# Patient Record
Sex: FEMALE | Race: WHITE | NOT HISPANIC OR LATINO | Employment: PART TIME | URBAN - METROPOLITAN AREA
[De-identification: names, ages, dates, MRNs, and addresses within clinical notes are randomized per-mention and may not be internally consistent; named-entity substitution may affect disease eponyms.]

---

## 2023-09-26 PROBLEM — Z13.71 SCREENING FOR GENETIC DISEASE CARRIER STATUS: Status: ACTIVE | Noted: 2023-09-26

## 2023-09-26 PROBLEM — O02.81 CHEMICAL PREGNANCY: Status: ACTIVE | Noted: 2023-09-26

## 2023-12-12 ENCOUNTER — OB ABSTRACT (OUTPATIENT)
Dept: OBGYN CLINIC | Facility: CLINIC | Age: 23
End: 2023-12-12

## 2023-12-12 ENCOUNTER — ULTRASOUND (OUTPATIENT)
Dept: OBGYN CLINIC | Facility: CLINIC | Age: 23
End: 2023-12-12
Payer: COMMERCIAL

## 2023-12-12 VITALS
WEIGHT: 112.6 LBS | BODY MASS INDEX: 19.22 KG/M2 | HEART RATE: 90 BPM | SYSTOLIC BLOOD PRESSURE: 110 MMHG | DIASTOLIC BLOOD PRESSURE: 70 MMHG | OXYGEN SATURATION: 98 % | HEIGHT: 64 IN

## 2023-12-12 DIAGNOSIS — Z36.89 CONFIRM FETAL CARDIAC ACTIVITY USING ULTRASOUND: ICD-10-CM

## 2023-12-12 DIAGNOSIS — Z3A.01 7 WEEKS GESTATION OF PREGNANCY: ICD-10-CM

## 2023-12-12 DIAGNOSIS — N91.2 AMENORRHEA: Primary | ICD-10-CM

## 2023-12-12 LAB — SL AMB POCT URINE HCG: POSITIVE

## 2023-12-12 PROCEDURE — 76817 TRANSVAGINAL US OBSTETRIC: CPT | Performed by: OBSTETRICS & GYNECOLOGY

## 2023-12-12 PROCEDURE — 99214 OFFICE O/P EST MOD 30 MIN: CPT | Performed by: OBSTETRICS & GYNECOLOGY

## 2023-12-12 PROCEDURE — 81025 URINE PREGNANCY TEST: CPT | Performed by: OBSTETRICS & GYNECOLOGY

## 2023-12-12 NOTE — PROGRESS NOTES
Assessment/Plan     Diagnoses and all orders for this visit:    Amenorrhea  -     POCT urine HCG    7 weeks gestation of pregnancy  -     Ambulatory Referral to Maternal Fetal Medicine; Future  -     AMB US OB < 14 weeks single or first gestation level 1    Confirm fetal cardiac activity using ultrasound  -     AMB US OB < 14 weeks single or first gestation level 1        Patient with viable pregnancy consistent with LMP. MANINDER will be 2024. Patient was advised to take prenatal vitamins. Advised to call if with pregnancy problems or concerns. Warning signs were discussed with patient. Discussed with patient avoidance of smoking, drinking and illicit drugs. She was advised to call if with questions regarding medication use during pregnancy. Patient referred to maternal-fetal medicine for first trimester ultrasound and genetic testing if desired. OB intake to be done virtually and return in 3 to 4 weeks for physical examination and initial OB exam.    Lashawn Salcido is an 20 y.o. woman who presents for pregnancy confirmation. Chief Complaint   Patient presents with    Pregnancy Ultrasound     LMP 10/19    Possible Pregnancy       Patient is here for a pregnancy confirmation. Patient's last menstrual period was 10/19/2023 (exact date).    Estimated Date of Delivery: 24      She denies vaginal bleeding or pelvic pain. She has no nausea or vomiting. She is taking prenatal vitamins. Her first positive pregnancy test was on 23. She had a chemical pregnancy a few months ago. She has not been on contraception recently. OB History    Para Term  AB Living   2 0 0 0 1 0   SAB IAB Ectopic Multiple Live Births   1 0 0 0 0      # Outcome Date GA Lbr Srikanth/2nd Weight Sex Delivery Anes PTL Lv   2 Current            1 SAB 23     Biochemical          No past medical history on file. No past surgical history on file.     Social History     Tobacco Use Smoking status: Never    Smokeless tobacco: Never   Vaping Use    Vaping Use: Never used   Substance Use Topics    Alcohol use: Not Currently    Drug use: Never       No Known Allergies      Current Outpatient Medications:     Prenatal Vit-Fe Fumarate-FA (PRENATAL 1+1 PO), Take by mouth, Disp: , Rfl:     The following portions of the patient's history were reviewed and updated as appropriate: allergies, current medications, past family history, past medical history, past social history, past surgical history, and problem list.      Review of Systems   Constitutional: Negative. HENT: Negative. Eyes: Negative. Respiratory: Negative. Cardiovascular: Negative. Gastrointestinal: Negative. Endocrine: Negative. Genitourinary:         As noted in HPI   Musculoskeletal: Negative. Skin: Negative. Allergic/Immunologic: Negative. Neurological: Negative. Hematological: Negative. Psychiatric/Behavioral: Negative. /70   Pulse 90   Ht 5' 4" (1.626 m)   Wt 51.1 kg (112 lb 9.6 oz)   LMP 10/19/2023 (Exact Date)   SpO2 98%   BMI 19.33 kg/m²     Physical Exam  Constitutional:       General: She is not in acute distress. Appearance: Normal appearance. She is well-developed. Abdominal:      Palpations: Abdomen is soft. Tenderness: There is no abdominal tenderness. There is no guarding. Neurological:      Mental Status: She is alert and oriented to person, place, and time. Skin:     General: Skin is warm and dry. Psychiatric:         Behavior: Behavior normal.           FIRST TRIMESTER OBSTETRIC ULTRASOUND  12/12/23    Li Will MD       INDICATION: Amenorrhea, viability    COMPARISON: None. TECHNIQUE:   Transvaginal imaging was performed to assess the gestation, myometrial/endometrial architecture and ovarian parenchymal detail. The study includes volumetric sweeps and traditional still imaging technique.       FINDINGS:     A single intrauterine gestation is identified. Cardiac activity is detected at 151 bpm.      YOLK SAC:  Present and normal in size and appearance. MEAN CROWN RUMP LENGTH:  12.5 mm = 7 weeks 3 days   AMNIOTIC FLUID/SAC SHAPE:  Within expected normal range. UTERUS/ADNEXA:   No adnexal mass or pathologic cyst.  No free fluid identified. IMPRESSION:     Single intrauterine pregnancy of 7 weeks 3 days gestational age. MANINDER by [address] 24  Fetal cardiac activity detected. No adnexal masses seen. Final MANINDER: 24 by LMP. Ultrasound Probe Disinfection    A transvaginal ultrasound was performed. Prior to use, disinfection was performed with High Level Disinfection Process ([employer]). Probe serial number F: [id number] was used. Caty Tellez MD  23  1:28 PM    I have spent a total time of 30 minutes on 23 in caring for this patient including Diagnostic results, Prognosis, Risks and benefits of tx options, Instructions for management, Patient and family education, Importance of tx compliance, Risk factor reductions, Impressions, Counseling / Coordination of care, Documenting in the medical record, Reviewing / ordering tests, medicine, procedures  , Obtaining or reviewing history  , and Communicating with other healthcare professionals .      Future Appointments   Date Time Provider [address]   2023  9:20 AM Sung Flynn PA-C OBGYN [address] [facility]   1/10/2024  2:30 PM Caty Tellez MD [facility]   2024 10:15 AM  [address]

## 2023-12-15 PROBLEM — O02.81 CHEMICAL PREGNANCY: Status: RESOLVED | Noted: 2023-09-26 | Resolved: 2023-12-15

## 2023-12-15 PROBLEM — Z13.71 SCREENING FOR GENETIC DISEASE CARRIER STATUS: Status: RESOLVED | Noted: 2023-09-26 | Resolved: 2023-12-15

## 2023-12-15 NOTE — PROGRESS NOTES
The patient was identified by name and date of birth. The patient was informed that this is a telemedicine visit and that the visit is being conducted through abeo and patient was informed this is a secure, HIPAA-complaint platform. She agrees to proceed..  My office door was closed. No one else was in the room.  She acknowledged consent and understanding of privacy and security of the video platform. The patient has agreed to participate and understands they can discontinue the visit at any time.    Patient location:  [place]    23 y.o. y/o female here for OB intake.     TV u/s done 23 confirms SLIUP  consistent with 10/19/23 LMP, final EDC 24 by LMP.        Current Outpatient Medications on File Prior to Visit   Medication Sig Dispense Refill    Prenatal Vit-Fe Fumarate-FA (PRENATAL 1+1 PO) Take by mouth       No current facility-administered medications on file prior to visit.       No past surgical history on file.    No past medical history on file.      Genetic screen:  Canavan disease- negative  Cerebral palsy- cousin X 2  Cleft lip/palate- negative  Congenital anomalies- negative  Congenital heart disease- negative  Consanguinity- negative  Cystic fibrosis- negative  Down's syndrome- negative  Hemophilia- negative  Covington's chorea- negative  Mental retardation/ intellectual disability/ cognitive delays- negative  Muscular dystrophy- negative  Neural tube defect- negative  Sickle cell anemia- negative  Carlos-sachs disease- negative  Fragile X- negative  Thalassemia- negative  Autism- negative  Type 1 diabetes- negative  PKU- negative  Premature ovarian failure- negative      OB History          2    Para   0    Term   0            AB   1    Living   0         SAB   1    IAB   0    Ectopic   0    Multiple   0    Live Births   0                 Previous pregnancy history/ complications: n/a    Age of patient: 23  Age of father of the baby: 27, Darvin Mathias    Exposure  risk:  Occupation:   Exposure chemicals/radiation:no  Medications taking since LMP:no  Smoker: no  Cat litter exposure: no    Depression screen:  negative  Domestic violence screen: negative      TB screening:   HIV-no  Close contact with individuals with known or suspected TB-no  Medical conditions known to increase risk of disease if infected  Ie. Diabetes, lupus, cancer, alcoholism, drug addiction- no  Birth in or emigration from high prevalent countries ([place], [place], [place], [place], [place], [place], [place], [place], [place]- no  Medically underserved- no  Homeless- no  Living or working in long term care facilities - yes    DRUG screening:  Parents:  Did any of your parents have a problem with alcohol or other drug use?  no    Partner: Does your partner have a problem with alcohol or drug use? no    Past: In the past, have you had difficulties in your life because of alcohol or other drugs, including prescription medication?  no    Present: In the past month have you drunk any alcohol or used other drugs?  no    Peers: Do any of your peers (friends, roommates, co-workers, etc) have a problem with alcohol or drug use? no    Cravings:  During this pregnancy, have you experienced cravings for substances such as opioids or methamphetamines? no    Substitute: During this pregnancy, have you taken or purchased buprenorphine (subutex or suboxone) that was prescribed to someone else? no    Thyroid disease risk:  BMI >30: no  Type 1 diabetes: no  Fhx or personal hx of thyroid disease: no    Diabetes risk screening:   BMI 30 or greater: no  Hx of macrosomia ( infant weight 9 lb or greater): no  Previous GDM hx: n/a  Hx PCOS or insulin resistance: no  Hx of elevated HgbA1c, glucose tolerance, fasting glucose: no    HTN: no  Hx elevated HDL/ triglycerides:no  1st degree relative with diabetes: no  Hx cardiovascular ds: no  , , ,  american, pacific  islander: no    BMI 25 or greater: no  BMI 23 or greater and  american: no    Glucola ordered: no    Other screening:  Ashkenazi Yarsani/ Syriac [place]/ Cajun descent: no, serafin-sac screening offered: no    Hx of gastric bypass/ gastric sleeve/ gastric band: no    Hx of HSV for patient or partner: no    Hx of MRSA: no    Last pap: 2022  Hx of abnormal pap smear: no  Hx of procedures to the cervix: no    Hx of chlamydia/ gonorrhea/ PID: no    Hx recurrent pregnancy loss/ stillbirth: no    Was this pregnancy a result of infertility treatment: no    Vaccine Hx:  Varicella: + vaccine  Flu vaccine: not completed  Hep B vaccine: completed   COVID vaccine :  completed x 2    Hx of covid in last 3 months: no    ASA/ PEC screen:  Previous uncomplicated full-term delivery: no  Multifetal gestation- 2 points: 0  Chronic HTN- 2 points: 0  Type 1 or 2 pre-gestational diabetes- 2 points: 0  Renal disease- 2 points: 0  Autoimmune disease- 2 points: 0  History of preeclampsia- 2 points: 0  IVF pregnancy- 1 point: 0  >10 year pregnancy interval-1 point: 0  Previous pregnancy with IUGR/ SGA/ adverse pregnancy outcome-1 point: 0  Nulliparity- 1 point: 1  BMI >30- 1 point: 0  Family history of preeclampsia in mother or sister- 1 point: 0  Age >or = 35- 1 point: 0   Race- 1 point: 0  Low socioeconomic status-1 point: 0    TOTAL SCORE: 1      Pertinent + Pre eclampsia risk factors: nulliparity  Pt has been recommended to take ASA 162mg during this pregnancy to lower her risk of preeclampsia: no    Other:  Pre pregnancy weight:   112lb    Ok with blood transfusion if needed: yes    Plans for feeding baby: breast    Blood type: ?    Hemoglobin electrophoresis completed in past: no    Preferred lab: [facility]    ONAF form needed: no    Nausea: occasional  Vomiting: no  severe cramping/ pain: no  Bleeding since pregnant: no  Urinary complaints: no  Fever or rash since pregnant: no    PROBLEM LIST includes:  Biochemical  pregnancy 9/2023  Family history of cerebral palsy:  patient says she is aware of no birth trauma/ early delivery.  Requests visit with genetic counselor      -Pregnancy: H&P completed today.       Prenatal course discussed with patient, including appointment schedule. Warning signs discussed and reviewed.  OB folder to be given with warning signs of pregnancy, prenatal visit schedule, safe medications in pregnancy, childbirth classes, lab location info, M info.    -Genetic testing: nuchal translucency/Sequential screening/ NIPT reviewed with patient - appt with M scheduled 1/11/24    -Carrier screening including Cystic fibrosis and spinal muscular atrophy reviewed: patient had negative testing in past    -OB exam: to be completed 1/10/24, see other encounter    -Reviewed benefits of influenza vaccination in pregnancy including but not limited to reduction in maternal influenza hospitalization, reduction in risk of stillbirth, and reduction in influenza-related morbidity and mortality among infants. Reviewed safety of influenza vaccine in pregnancy and overall very low risk of reaction or adverse effects. Patient voiced understanding of all this.        NEEDS:  ob folder, pap,gonorrhea/ chlamydia culture

## 2023-12-18 ENCOUNTER — TELEPHONE (OUTPATIENT)
Age: 23
End: 2023-12-18

## 2023-12-18 ENCOUNTER — TELEMEDICINE (OUTPATIENT)
Age: 23
End: 2023-12-18

## 2023-12-18 DIAGNOSIS — Z3A.08 8 WEEKS GESTATION OF PREGNANCY: ICD-10-CM

## 2023-12-18 DIAGNOSIS — Z34.80 ENCOUNTER FOR SUPERVISION OF OTHER NORMAL PREGNANCY, UNSPECIFIED TRIMESTER: Primary | ICD-10-CM

## 2023-12-18 DIAGNOSIS — Z82.0 FAMILY HISTORY OF CEREBRAL PALSY: ICD-10-CM

## 2023-12-18 PROCEDURE — OBC: Performed by: PHYSICIAN ASSISTANT

## 2023-12-18 NOTE — PATIENT INSTRUCTIONS
"Again, congratulations on your pregnancy!  NEXT STEPS  Get Prenatal bloodwork if not already done  Call MFM to schedule next ultrasound (done 12-13 wks)   Contact information for [facility] (AKA Maternal Fetal Medicine)- Main Number [phone]   Return to our office in 4 weeks for routine prenatal visit (or sooner if any problems/concerns arise- see packet for things to report)  Check out [facility] website to read the \"Pregnancy Essentials Guide\"      It can be found by going to [facility]-->select services-->select women's health-->select Obstetrics  [email or web address]  ----------------------------------------------------  Hospital Affiliation & [place]irections    [place] [address]    Warning Signs During Pregnancy  The list below includes warning signs your providers would like you to be aware of.  If you experience any of these at any time during your pregnancy, please call us as soon as possible.     Vaginal bleeding   Sharp abdominal pain that does not go away   Fever (more than 100.4?F and is not relieved with Tylenol)   Persistent vomiting lasting greater than 24 hours   Chest pain/Shortness of breath   Pain or burning when you urinate    Call the OFFICE [phone] for any questions/emergencies.  At night or on the weekend, calls go through a triage service, please indicate it is an emergency and the DOCTOR on call will be paged.  ---------------------------------------------------------------    Discomforts of Early Pregnancy  Tips for coping with nausea and vomiting during pregnancy   Eat meals and snacks slowly   Eat every 1-2 hours to avoid a full stomach   Don’t skip meals, avoid empty stomach   Eat a snack prior to getting out of bed   Avoid food and beverages with a strong aroma   Avoid dehydration - drink enough fluid to keep the urine pale yellow   Drink fluids before a meal to minimize " the effect of a full stomach   Limit the amount of coffee and beverages that contain caffeine   Eliminate spicy, odorous, high fat (fried foods), acidic (tomato products) and sweet foods   Fluids that contain lemon (lemonade), mint (tea) or orange can usually be well tolerated   Snacks and meals that contain low-fat protein (lean meats, fish, poultry and eggs) along with eating easily digestible carbs (fruit, rice, toast, crackers and dry cereal) may be tolerated better   Foods with ginger may be well tolerated. May use ginger root powder, capsules or extract (up to 1000 mg per day)   Drink liquids in small amounts    If symptoms persist, please contact your provider.      Tips for coping with constipation during pregnancy   Increase fiber and fluids.  - Drink 8-10 cups of liquid, like water or low-sugar juice daily  - Keep urine pale with fluids (water, milk), fruit and vegetables   Eat a well-balanced diet that contains high fiber food (fruits, vegetables, whole grain breads and cereals, bran and dried beans)   Take a 30-minute walk daily   You may take a mild stool softener such as Colace®    If symptoms persist, please contact your provider.      For any emergencies, PLEASE CALL THE OFFICE at [phone]. If the office is closed, the doctor on call will be paged by the answering service.    Medications and Pregnancy- see handout in packetExpected Weight Gain During Pregnancy  If you have a healthy BMI (18-25) prior to pregnancy:  The recommended weight gain is between 25-35 pounds. Approximate weight gain  in the first trimester is 1-4.5 pounds. An expected weight gain during the second and  third trimester is approximately one pound per week.  If you have a BMI of less than 18 prior to pregnancy,  you are considered underweight:  The recommended weight gain is between 28-40 pounds. Approximate weight gain  in the first trimester is 1-4.5 pounds. An expected weight gain during the second and  third trimester  is just over one pound per week.  If your BMI is 25 to 29.9: you are considered overweight:  You should gain 1/2 to 2/3 pound during the second and third trimester, for a total  weight gain of 15 to 25 pounds.  If you have a BMI of greater than 30 prior to pregnancy,  you are considered overweight:  The recommended weight gain is between 15-25 pounds. Approximate weight gain  in the first trimester is 1-4.5 pounds. An expected weight gain during the second  and third trimester is approximately 0.5 pound per week.    Foods to avoid during pregnancy:   Unpasteurized milk, juice and cheese  - Soft cheeses like feta or brie (if made with UNPASTEURIZED milk)   Unheated deli meats like lunchmeat and hotdogs   Undercooked poultry, beef, pork, seafood including raw sushi    What fish is safe to eat during pregnancy?  Eat 8 to 12 ounces of fish a week. Pick from this group frequently, especially if you follow  the American Heart Association’s recommendation to eat fish at least 2 times a week.    AVOID HIGH MERCURY FISH  A single meal of the following fish can put  you over the Environmental Protection  Agency’s safe limit for the month.  High mercury fish:  Shark  Swordfish  Osman Mackerel  Tile Fish    Caffeine and Pregnancy  The March of Dimes and American College of Obstetrics and Gynecologists (ACOG) urge pregnant  women to limit their caffeine consumption to no more than 200 milligrams (mg) per day. This is  comparable to having one 12-ounce cup of coffee a day. This level has been shown not to increase risk  of miscarriage, growth or  labor complications. Effects of higher levels are not known.    Exercise During Pregnancy  A daily exercise program that consists of 30 minutes a day is recommended.   Low impact exercises like walking and swimming are great exercises throughout  all of pregnancy   If you’re an avid strength  avoid lifting very heavy weights - nothing more  than 30 pounds    Drink plenty of  fluids while exercising to stay hydrated.  Be careful to avoid overheating.    ACTIVITIES TO AVOID   Exercises that can make you lose your balance.   Activities that can put your baby at risk i.e. horseback riding, scuba diving, skiing  or snowboarding. Any other sport that puts you at risk for getting hit in the  abdominal area.   Do not use saunas, steam rooms or hot tubs (that have a higher temperature  than 100F)   After the first trimester, avoid exercises that require you to lay flat on your back.   Avoid exceeding a heart rate greater than 140 beats per minute. As long as you are  able to hold a conversation while exercising your heart rate is likely acceptable    Vaccines and Pregnancy    Information for pregnant women  Vaccines help protect you and your baby against serious diseases.  Whooping Cough Vaccine  Whooping cough (or pertussis) can be serious for anyone, but for your , it can be lifethreatening.  Up to 20 babies die each year in the [place] due to whooping cough.   When you get the whooping cough vaccine during your pregnancy, your body will create protective  antibodies and pass some of them to your baby before birth. These antibodies will provide your  baby some short-term, early protection against whooping cough.  Learn more at www.cdc.gov/pertussis/pregnant/.    Flu Vaccine  Changes in your immune, heart, and lung functions during pregnancy make you more likely to get  seriously ill from the flu. Catching the flu also increases your chances for serious problems for your  developing baby, including premature labor and delivery. Get the flu shot if you are pregnant during  flu season--it’s the best way to protect yourself and your baby for several months after birth from flu-related  complications.  Flu seasons vary in their timing from season to season, but CDC recommends getting vaccinated  by the end of October, if possible. This timing helps protect you before flu activity  begins to increase.  Find more on how to prevent the flu by visiting www.cdc.gov/flu/.    Covid Vaccine  Similar to the flu, Changes in your immune, heart, and lung functions during pregnancy make you more likely to get  seriously ill from COVID. It  also increases your chances for serious problems for your  developing baby, including premature labor and delivery.  Please consider getting your covid vaccine if you haven't already. This is endorsed by both ACOG- American College of Obstetrics and Gynecology and SMFM- Society of Maternal Fetal Medicine    Fetal Activity  You will most likely start to feel your baby move (also known as quickening) between  18 and 20 weeks of pregnancy. First time moms might feel their baby’s movements  closer to 25 weeks while a second time mom might feel those first movements closer  to 16 weeks.

## 2023-12-18 NOTE — TELEPHONE ENCOUNTER
Phone call to patient as she has not signed onto virtual visit.    Patient answered and says she forgot.  She will log on now

## 2024-01-08 ENCOUNTER — TELEPHONE (OUTPATIENT)
Dept: PERINATAL CARE | Facility: OTHER | Age: 24
End: 2024-01-08

## 2024-01-08 NOTE — TELEPHONE ENCOUNTER
Left patient a message that her 1/11 [facility] appointment had to be rescheduled to 1/16 [facility] @ 8AM. The new time, date and location were provided.  The patient has been instructed to please call us back at [phone] with any questions or concerns.

## 2024-01-09 NOTE — PROGRESS NOTES
OB/GYN  PRENATAL H&P VISIT  Liza Mathias  1/10/2024  2:38 PM  Dr. Yanely Guzman MD      SUBJECTIVE  Patient is here for initial PE.  OB history was reviewed.      Estimated Date of Delivery: 24  11w5d      Bleeding no  Cramping/Pelvic Pain no  Abnormal Discharge no  Nausea no  Vomiting no    OB History    Para Term  AB Living   2 0 0 0 1 0   SAB IAB Ectopic Multiple Live Births   1 0 0 0 0      # Outcome Date GA Lbr Srikanth/2nd Weight Sex Delivery Anes PTL Lv   2 Current            1 SAB 23     Biochemical          Review of Systems   Constitutional: Negative.    HENT: Negative.     Eyes: Negative.    Respiratory: Negative.     Cardiovascular: Negative.    Gastrointestinal: Negative.    Endocrine: Negative.    Genitourinary:         As noted in HPI   Musculoskeletal: Negative.    Skin: Negative.    Allergic/Immunologic: Negative.    Neurological: Negative.    Hematological: Negative.    Psychiatric/Behavioral: Negative.         Past Medical History:   Diagnosis Date    Asthma     childhood       No past surgical history on file.    Social History     Socioeconomic History    Marital status: /Civil Union     Spouse name: Not on file    Number of children: Not on file    Years of education: Not on file    Highest education level: Not on file   Occupational History    Not on file   Tobacco Use    Smoking status: Never    Smokeless tobacco: Never   Vaping Use    Vaping status: Never Used   Substance and Sexual Activity    Alcohol use: Not Currently    Drug use: Never    Sexual activity: Yes     Partners: Male     Birth control/protection: None   Other Topics Concern    Not on file   Social History Narrative    Not on file     Social Determinants of Health     Financial Resource Strain: Not on file   Food Insecurity: Not on file   Transportation Needs: Not on file   Physical Activity: Not on file   Stress: Not on file   Social Connections: Not on file   Intimate Partner Violence: Not  "on file   Housing Stability: Not on file       OBJECTIVE    /62   Pulse 73   Ht 5' 4\" (1.626 m)   Wt 52.2 kg (115 lb)   LMP 10/19/2023 (Exact Date)   SpO2 99%   BMI 19.74 kg/m²       Physical Exam  Constitutional:       Appearance: She is well-developed.   Genitourinary:      No vaginal discharge.        Right Adnexa: not tender and no mass present.     Left Adnexa: no mass present.     No cervical motion tenderness, lesion or polyp.      Uterus is enlarged.      Uterus is not tender.   Breasts:     Right: No mass, skin change or tenderness.      Left: No mass, skin change or tenderness.   HENT:      Head: Normocephalic.   Cardiovascular:      Rate and Rhythm: Normal rate and regular rhythm.      Heart sounds: Normal heart sounds.   Pulmonary:      Effort: Pulmonary effort is normal.      Breath sounds: Normal breath sounds.   Abdominal:      General: There is no distension.      Palpations: Abdomen is soft.      Tenderness: There is no abdominal tenderness. There is no guarding.   Neurological:      Mental Status: She is alert and oriented to person, place, and time.   Skin:     General: Skin is warm and dry.   Psychiatric:         Behavior: Behavior normal.           ASSESSMENT AND PLAN    Problem List          Nervous and Auditory    Family history of cerebral palsy    Overview     Patient requests referral to genetic counselor            Other    Encounter for supervision of other normal pregnancy, unspecified trimester    Overview     CF/ SMA testing: negative carrier  Flu vaccine: received 12/18/23  Covid vaccine:  completed X 2         11 weeks gestation of pregnancy       by doppler, confirmed with US        1. Pregnancy: H&P completed today. PN Labs to still be done      Prenatal course discussed with patient, including appointment schedule. Warning signs discussed and reviewed.    2. Screening: Pap smear 2022. GC/CT collected.     3. Genetic testing: Pt has appt with [facility]    4. " Immunizations: flu vaccine at [facility] received  23    5. Follow up: Return in 4 weeks.       Future Appointments   Date Time Provider Department Center   2024  8:00 AM [facility] 3 [facility]         Yanely Guzman MD  2:38 PM

## 2024-01-10 ENCOUNTER — INITIAL PRENATAL (OUTPATIENT)
Dept: OBGYN CLINIC | Facility: CLINIC | Age: 24
End: 2024-01-10

## 2024-01-10 VITALS
HEIGHT: 64 IN | WEIGHT: 115 LBS | DIASTOLIC BLOOD PRESSURE: 62 MMHG | HEART RATE: 73 BPM | OXYGEN SATURATION: 99 % | SYSTOLIC BLOOD PRESSURE: 100 MMHG | BODY MASS INDEX: 19.63 KG/M2

## 2024-01-10 DIAGNOSIS — Z11.3 SCREEN FOR STD (SEXUALLY TRANSMITTED DISEASE): ICD-10-CM

## 2024-01-10 DIAGNOSIS — Z3A.11 11 WEEKS GESTATION OF PREGNANCY: ICD-10-CM

## 2024-01-10 DIAGNOSIS — Z82.0 FAMILY HISTORY OF CEREBRAL PALSY: Primary | ICD-10-CM

## 2024-01-10 PROCEDURE — PNV: Performed by: OBSTETRICS & GYNECOLOGY

## 2024-01-10 PROCEDURE — 87591 N.GONORRHOEAE DNA AMP PROB: CPT | Performed by: OBSTETRICS & GYNECOLOGY

## 2024-01-10 PROCEDURE — 87491 CHLMYD TRACH DNA AMP PROBE: CPT | Performed by: OBSTETRICS & GYNECOLOGY

## 2024-01-11 LAB
C TRACH DNA SPEC QL NAA+PROBE: NEGATIVE
C TRACH DNA SPEC QL NAA+PROBE: NEGATIVE
N GONORRHOEA DNA SPEC QL NAA+PROBE: NEGATIVE
N GONORRHOEA DNA SPEC QL NAA+PROBE: NEGATIVE

## 2024-01-12 ENCOUNTER — APPOINTMENT (OUTPATIENT)
Age: 24
End: 2024-01-12
Payer: COMMERCIAL

## 2024-01-12 DIAGNOSIS — Z34.80 ENCOUNTER FOR SUPERVISION OF OTHER NORMAL PREGNANCY, UNSPECIFIED TRIMESTER: ICD-10-CM

## 2024-01-12 DIAGNOSIS — Z3A.08 8 WEEKS GESTATION OF PREGNANCY: ICD-10-CM

## 2024-01-12 LAB
ABO GROUP BLD: NORMAL
ABO GROUP BLD: NORMAL
AMORPH URATE CRY URNS QL MICRO: ABNORMAL
AMORPH URATE CRY URNS QL MICRO: ABNORMAL
BACTERIA UR QL AUTO: ABNORMAL /HPF
BACTERIA UR QL AUTO: ABNORMAL /HPF
BASOPHILS # BLD AUTO: 0.02 THOUSANDS/ÂΜL (ref 0–0.1)
BASOPHILS # BLD AUTO: 0.02 THOUSANDS/ÂΜL (ref 0–0.1)
BASOPHILS NFR BLD AUTO: 0 % (ref 0–1)
BASOPHILS NFR BLD AUTO: 0 % (ref 0–1)
BILIRUB UR QL STRIP: NEGATIVE
BILIRUB UR QL STRIP: NEGATIVE
BLD GP AB SCN SERPL QL: NEGATIVE
BLD GP AB SCN SERPL QL: NEGATIVE
CLARITY UR: CLEAR
CLARITY UR: CLEAR
COLOR UR: ABNORMAL
COLOR UR: ABNORMAL
EOSINOPHIL # BLD AUTO: 0.1 THOUSAND/ÂΜL (ref 0–0.61)
EOSINOPHIL # BLD AUTO: 0.1 THOUSAND/ÂΜL (ref 0–0.61)
EOSINOPHIL NFR BLD AUTO: 2 % (ref 0–6)
EOSINOPHIL NFR BLD AUTO: 2 % (ref 0–6)
ERYTHROCYTE [DISTWIDTH] IN BLOOD BY AUTOMATED COUNT: 13.3 % (ref 11.6–15.1)
ERYTHROCYTE [DISTWIDTH] IN BLOOD BY AUTOMATED COUNT: 13.3 % (ref 11.6–15.1)
GLUCOSE UR STRIP-MCNC: NEGATIVE MG/DL
GLUCOSE UR STRIP-MCNC: NEGATIVE MG/DL
HCT VFR BLD AUTO: 35.9 % (ref 34.8–46.1)
HCT VFR BLD AUTO: 35.9 % (ref 34.8–46.1)
HGB BLD-MCNC: 12.2 G/DL (ref 11.5–15.4)
HGB BLD-MCNC: 12.2 G/DL (ref 11.5–15.4)
HGB UR QL STRIP.AUTO: NEGATIVE
HGB UR QL STRIP.AUTO: NEGATIVE
IMM GRANULOCYTES # BLD AUTO: 0.02 THOUSAND/UL (ref 0–0.2)
IMM GRANULOCYTES # BLD AUTO: 0.02 THOUSAND/UL (ref 0–0.2)
IMM GRANULOCYTES NFR BLD AUTO: 0 % (ref 0–2)
IMM GRANULOCYTES NFR BLD AUTO: 0 % (ref 0–2)
KETONES UR STRIP-MCNC: NEGATIVE MG/DL
KETONES UR STRIP-MCNC: NEGATIVE MG/DL
LEUKOCYTE ESTERASE UR QL STRIP: ABNORMAL
LEUKOCYTE ESTERASE UR QL STRIP: ABNORMAL
LYMPHOCYTES # BLD AUTO: 0.81 THOUSANDS/ÂΜL (ref 0.6–4.47)
LYMPHOCYTES # BLD AUTO: 0.81 THOUSANDS/ÂΜL (ref 0.6–4.47)
LYMPHOCYTES NFR BLD AUTO: 14 % (ref 14–44)
LYMPHOCYTES NFR BLD AUTO: 14 % (ref 14–44)
MCH RBC QN AUTO: 28.9 PG (ref 26.8–34.3)
MCH RBC QN AUTO: 28.9 PG (ref 26.8–34.3)
MCHC RBC AUTO-ENTMCNC: 34 G/DL (ref 31.4–37.4)
MCHC RBC AUTO-ENTMCNC: 34 G/DL (ref 31.4–37.4)
MCV RBC AUTO: 85 FL (ref 82–98)
MCV RBC AUTO: 85 FL (ref 82–98)
MONOCYTES # BLD AUTO: 0.34 THOUSAND/ÂΜL (ref 0.17–1.22)
MONOCYTES # BLD AUTO: 0.34 THOUSAND/ÂΜL (ref 0.17–1.22)
MONOCYTES NFR BLD AUTO: 6 % (ref 4–12)
MONOCYTES NFR BLD AUTO: 6 % (ref 4–12)
MUCOUS THREADS UR QL AUTO: ABNORMAL
MUCOUS THREADS UR QL AUTO: ABNORMAL
NEUTROPHILS # BLD AUTO: 4.65 THOUSANDS/ÂΜL (ref 1.85–7.62)
NEUTROPHILS # BLD AUTO: 4.65 THOUSANDS/ÂΜL (ref 1.85–7.62)
NEUTS SEG NFR BLD AUTO: 78 % (ref 43–75)
NEUTS SEG NFR BLD AUTO: 78 % (ref 43–75)
NITRITE UR QL STRIP: NEGATIVE
NITRITE UR QL STRIP: NEGATIVE
NON-SQ EPI CELLS URNS QL MICRO: ABNORMAL /HPF
NON-SQ EPI CELLS URNS QL MICRO: ABNORMAL /HPF
NRBC BLD AUTO-RTO: 0 /100 WBCS
NRBC BLD AUTO-RTO: 0 /100 WBCS
PH UR STRIP.AUTO: 7 [PH]
PH UR STRIP.AUTO: 7 [PH]
PLATELET # BLD AUTO: 209 THOUSANDS/UL (ref 149–390)
PLATELET # BLD AUTO: 209 THOUSANDS/UL (ref 149–390)
PMV BLD AUTO: 11.1 FL (ref 8.9–12.7)
PMV BLD AUTO: 11.1 FL (ref 8.9–12.7)
PROT UR STRIP-MCNC: NEGATIVE MG/DL
PROT UR STRIP-MCNC: NEGATIVE MG/DL
RBC # BLD AUTO: 4.22 MILLION/UL (ref 3.81–5.12)
RBC # BLD AUTO: 4.22 MILLION/UL (ref 3.81–5.12)
RBC #/AREA URNS AUTO: ABNORMAL /HPF
RBC #/AREA URNS AUTO: ABNORMAL /HPF
RH BLD: NEGATIVE
RH BLD: NEGATIVE
RUBV IGG SERPL IA-ACNC: 10.7 IU/ML
RUBV IGG SERPL IA-ACNC: 10.7 IU/ML
SP GR UR STRIP.AUTO: 1.02 (ref 1–1.03)
SP GR UR STRIP.AUTO: 1.02 (ref 1–1.03)
SPECIMEN EXPIRATION DATE: NORMAL
SPECIMEN EXPIRATION DATE: NORMAL
UROBILINOGEN UR STRIP-ACNC: <2 MG/DL
UROBILINOGEN UR STRIP-ACNC: <2 MG/DL
WBC # BLD AUTO: 5.94 THOUSAND/UL (ref 4.31–10.16)
WBC # BLD AUTO: 5.94 THOUSAND/UL (ref 4.31–10.16)
WBC #/AREA URNS AUTO: ABNORMAL /HPF
WBC #/AREA URNS AUTO: ABNORMAL /HPF

## 2024-01-12 PROCEDURE — 86480 TB TEST CELL IMMUN MEASURE: CPT

## 2024-01-12 PROCEDURE — 36415 COLL VENOUS BLD VENIPUNCTURE: CPT

## 2024-01-12 PROCEDURE — 86706 HEP B SURFACE ANTIBODY: CPT

## 2024-01-12 PROCEDURE — 81001 URINALYSIS AUTO W/SCOPE: CPT

## 2024-01-12 PROCEDURE — 86762 RUBELLA ANTIBODY: CPT

## 2024-01-12 PROCEDURE — 86780 TREPONEMA PALLIDUM: CPT

## 2024-01-12 PROCEDURE — 86900 BLOOD TYPING SEROLOGIC ABO: CPT

## 2024-01-12 PROCEDURE — 86901 BLOOD TYPING SEROLOGIC RH(D): CPT

## 2024-01-12 PROCEDURE — 87086 URINE CULTURE/COLONY COUNT: CPT

## 2024-01-12 PROCEDURE — 83020 HEMOGLOBIN ELECTROPHORESIS: CPT

## 2024-01-12 PROCEDURE — 87389 HIV-1 AG W/HIV-1&-2 AB AG IA: CPT

## 2024-01-12 PROCEDURE — 86850 RBC ANTIBODY SCREEN: CPT

## 2024-01-12 PROCEDURE — 87340 HEPATITIS B SURFACE AG IA: CPT

## 2024-01-12 PROCEDURE — 86803 HEPATITIS C AB TEST: CPT

## 2024-01-12 PROCEDURE — 85025 COMPLETE CBC W/AUTO DIFF WBC: CPT

## 2024-01-13 LAB
GAMMA INTERFERON BACKGROUND BLD IA-ACNC: <0 IU/ML
GAMMA INTERFERON BACKGROUND BLD IA-ACNC: <0 IU/ML
HBV SURFACE AB SER-ACNC: >500 MIU/ML
HBV SURFACE AB SER-ACNC: >500 MIU/ML
HBV SURFACE AG SER QL: NORMAL
HBV SURFACE AG SER QL: NORMAL
HCV AB SER QL: NORMAL
HCV AB SER QL: NORMAL
HIV 1+2 AB+HIV1 P24 AG SERPL QL IA: NORMAL
HIV 1+2 AB+HIV1 P24 AG SERPL QL IA: NORMAL
HIV 2 AB SERPL QL IA: NORMAL
HIV 2 AB SERPL QL IA: NORMAL
HIV1 AB SERPL QL IA: NORMAL
HIV1 AB SERPL QL IA: NORMAL
HIV1 P24 AG SERPL QL IA: NORMAL
HIV1 P24 AG SERPL QL IA: NORMAL
M TB IFN-G BLD-IMP: NEGATIVE
M TB IFN-G BLD-IMP: NEGATIVE
M TB IFN-G CD4+ BCKGRND COR BLD-ACNC: 0 IU/ML
MITOGEN IGNF BCKGRD COR BLD-ACNC: 10 IU/ML
MITOGEN IGNF BCKGRD COR BLD-ACNC: 10 IU/ML
TREPONEMA PALLIDUM IGG+IGM AB [PRESENCE] IN SERUM OR PLASMA BY IMMUNOASSAY: NORMAL
TREPONEMA PALLIDUM IGG+IGM AB [PRESENCE] IN SERUM OR PLASMA BY IMMUNOASSAY: NORMAL

## 2024-01-14 LAB
BACTERIA UR CULT: ABNORMAL
BACTERIA UR CULT: ABNORMAL

## 2024-01-15 ENCOUNTER — TELEPHONE (OUTPATIENT)
Age: 24
End: 2024-01-15

## 2024-01-15 PROBLEM — O09.899 RUBELLA NON-IMMUNE STATUS, ANTEPARTUM: Status: ACTIVE | Noted: 2024-01-15

## 2024-01-15 PROBLEM — Z28.39 RUBELLA NON-IMMUNE STATUS, ANTEPARTUM: Status: ACTIVE | Noted: 2024-01-15

## 2024-01-15 PROBLEM — O26.899 RH NEGATIVE STATE IN ANTEPARTUM PERIOD: Status: ACTIVE | Noted: 2024-01-15

## 2024-01-15 PROBLEM — Z67.91 RH NEGATIVE STATE IN ANTEPARTUM PERIOD: Status: ACTIVE | Noted: 2024-01-15

## 2024-01-15 NOTE — TELEPHONE ENCOUNTER
Patient returned call. Informed patient of lab results. Patient states she is not having any urinary symptoms.

## 2024-01-15 NOTE — TELEPHONE ENCOUNTER
Left message on machine for pt to call office re: lab results    1) rubella non immune.  Will need PP vaccine    2) blood type A negative.   Will need rhogam at 28 weeks or sooner if any bleeding.    3) urine showing low levels of lactobacilli.  Usually this is vaginal contaminant and not UTI.  Any urine symptoms.

## 2024-01-16 ENCOUNTER — ROUTINE PRENATAL (OUTPATIENT)
Dept: PERINATAL CARE | Facility: OTHER | Age: 24
End: 2024-01-16
Payer: COMMERCIAL

## 2024-01-16 VITALS
SYSTOLIC BLOOD PRESSURE: 102 MMHG | HEART RATE: 90 BPM | DIASTOLIC BLOOD PRESSURE: 62 MMHG | BODY MASS INDEX: 19.6 KG/M2 | HEIGHT: 64 IN | WEIGHT: 114.8 LBS

## 2024-01-16 DIAGNOSIS — Z3A.12 12 WEEKS GESTATION OF PREGNANCY: ICD-10-CM

## 2024-01-16 DIAGNOSIS — Z33.1 PREGNANT STATE, INCIDENTAL: ICD-10-CM

## 2024-01-16 DIAGNOSIS — Z36.82 ENCOUNTER FOR (NT) NUCHAL TRANSLUCENCY SCAN: Primary | ICD-10-CM

## 2024-01-16 DIAGNOSIS — Z36.0 ENCOUNTER FOR ANTENATAL SCREENING FOR CHROMOSOMAL ANOMALIES: ICD-10-CM

## 2024-01-16 LAB
HGB A MFR BLD: 2.8 % (ref 1.8–3.2)
HGB A MFR BLD: 97.2 % (ref 96.4–98.8)
HGB F MFR BLD: 0 % (ref 0–2)
HGB FRACT BLD-IMP: NORMAL
HGB S MFR BLD: 0 %

## 2024-01-16 PROCEDURE — 36415 COLL VENOUS BLD VENIPUNCTURE: CPT | Performed by: STUDENT IN AN ORGANIZED HEALTH CARE EDUCATION/TRAINING PROGRAM

## 2024-01-16 PROCEDURE — 99202 OFFICE O/P NEW SF 15 MIN: CPT | Performed by: STUDENT IN AN ORGANIZED HEALTH CARE EDUCATION/TRAINING PROGRAM

## 2024-01-16 PROCEDURE — 76813 OB US NUCHAL MEAS 1 GEST: CPT | Performed by: STUDENT IN AN ORGANIZED HEALTH CARE EDUCATION/TRAINING PROGRAM

## 2024-01-16 NOTE — PROGRESS NOTES
"[facility]: Ms. Banuelos was seen today for nuchal translucency ultrasound.  See ultrasound report under \"OB Procedures\" tab.        Physical Exam  Constitutional:       General: She is not in acute distress.     Appearance: Normal appearance.   HENT:      Head: Normocephalic and atraumatic.   Eyes:      Extraocular Movements: Extraocular movements intact.   Cardiovascular:      Rate and Rhythm: Normal rate.   Pulmonary:      Effort: Pulmonary effort is normal. No respiratory distress.   Skin:     Findings: No erythema or rash.   Neurological:      Mental Status: She is alert and oriented to person, place, and time.   Psychiatric:         Mood and Affect: Mood normal.         Behavior: Behavior normal.         Please don't hesitate to contact our office with any concerns or questions.  -Sandra Hayes MD    "

## 2024-01-16 NOTE — LETTER
"2024     Joselyn Resendiz MD  [address]  [address]  [place] [id number]    Patient: Ana Banuelos   YOB: 2000   Date of Visit: 2024       Dear Dr. Resendiz:    Thank you for referring Ana Banuelos to me for evaluation. Below are my notes for this consultation.    If you have questions, please do not hesitate to call me. I look forward to following your patient along with you.         Sincerely,        Sandra Hayes MD        CC: No Recipients    Sandra Hayes MD  2024  8:44 AM  Sign when Signing Visit  [facility]: Ms. Banuelos was seen today for nuchal translucency ultrasound.  See ultrasound report under \"OB Procedures\" tab.        Physical Exam  Constitutional:       General: She is not in acute distress.     Appearance: Normal appearance.   HENT:      Head: Normocephalic and atraumatic.   Eyes:      Extraocular Movements: Extraocular movements intact.   Cardiovascular:      Rate and Rhythm: Normal rate.   Pulmonary:      Effort: Pulmonary effort is normal. No respiratory distress.   Skin:     Findings: No erythema or rash.   Neurological:      Mental Status: She is alert and oriented to person, place, and time.   Psychiatric:         Mood and Affect: Mood normal.         Behavior: Behavior normal.         Please don't hesitate to contact our office with any concerns or questions.  -Sandra Hayes MD    "

## 2024-01-16 NOTE — PROGRESS NOTES
"Patient chose to have [employer] Non-invasive Prenatal Screen with fetal sex.   Patient choose billed through insurance.   Patient assistance program (PAP) application provided to patient no.  PAP sent with specimen No.     Patient given brochure and is aware [employer] will contact their insurance and coordinate coverage.  Patient made aware she will receive a text message and e-mail from [employer].   Patient informed text/phone call message will come from area code  \"415\".  Provided [employer] Client Services # [phone] and web site at [email or web address].   \"Coffeen your test online\" card with barcode and test tube ID provided to patient.   Reviewed [employer] web site states 5-7 business days for results via their portal.   [employer] message will be sent to patient when [facility] receives results /provider reviews.    2 vials of blood drawn from  left  arm by Keke RAMOS RN.   Patient tolerated blood draw without difficulty.  Specimens labeled with patient identifiers (name, date of birth, specimen collection date), order and specimen were verified with patient, packed and sent via [employer] lab .  FED EX  tracking #  [id number]  Copy of lab order scanned to Epic media.    Maternal Fetal Medicine will have results in approximately 7-10 business days and will call patient or notify via [employer].  Patient aware viewing lab result online will reveal fetal sex if ordered.    Patient verbalized understanding of all instructions and no questions at this time.    "

## 2024-02-06 NOTE — PROGRESS NOTES
"OB/GYN  PN Visit  Ana Banuelos  [id number]  2024  2:44 PM  Dr. Joselyn Resendiz MD    S: 23 y.o.  15w6d here for PN visit.       Chief Complaint   Patient presents with    Routine Prenatal Visit     No concerns          OB complaints:  Contractions: no  Leakage: no  Bleeding: no  Fetal movement: no      O:  /62   Pulse 85   Ht 5' 4\" (1.626 m)   Wt 52.6 kg (116 lb)   LMP 10/19/2023 (Exact Date)   SpO2 97%   BMI 19.91 kg/m²       Review of Systems   Constitutional: Negative.    HENT: Negative.     Eyes: Negative.    Respiratory: Negative.     Cardiovascular: Negative.    Gastrointestinal: Negative.    Endocrine: Negative.    Genitourinary:         As noted in HPI   Musculoskeletal: Negative.    Skin: Negative.    Allergic/Immunologic: Negative.    Neurological: Negative.    Hematological: Negative.    Psychiatric/Behavioral: Negative.           Physical Exam  Constitutional:       General: She is not in acute distress.     Appearance: Normal appearance. She is well-developed.   Abdominal:      Palpations: Abdomen is soft.      Tenderness: There is no abdominal tenderness. There is no guarding.   Neurological:      Mental Status: She is alert and oriented to person, place, and time.   Skin:     General: Skin is warm and dry.   Psychiatric:         Behavior: Behavior normal.             Pregravid Weight/BMI: 50.8 kg (112 lb) (BMI 19.22)  Current Weight: 52.6 kg (116 lb)   Total Weight Gain: 1.814 kg (4 lb)   Pre-Cristin Vitals      Flowsheet Row Most Recent Value   Prenatal Assessment    Fetal Heart Rate 152   Prenatal Vitals    Blood Pressure 100/62   Weight - Scale 52.6 kg (116 lb)   Urine Albumin/Glucose    Dilation/Effacement/Station    Vaginal Drainage    Edema              Problem List          Nervous and Auditory    Family history of cerebral palsy    Overview     Patient requests referral to genetic counselor            Other    Encounter for supervision of other normal pregnancy, " unspecified trimester    Overview     CF/ SMA testing: negative carrier  Flu vaccine: received 23  Covid vaccine:  completed X 2         15 weeks gestation of pregnancy    Rh negative state in antepartum period    Overview     Will need rhogam at 28 wks         Rubella non-immune status, antepartum    Overview     Will need postpartum exam          Other Visit Diagnoses       Second trimester pregnancy    -  Primary    Need for maternal serum alpha-protein (MSAFP) screening               TWG 25-35 lbs  MMR postpartum  Follow-up in 4 weeks      Future Appointments   Date Time Provider Department Center   3/12/2024 10:15 AM   3 [facility]               Joselyn Resendiz MD  2024  2:44 PM

## 2024-02-06 NOTE — PATIENT INSTRUCTIONS
Pregnancy at 15 to 18 Weeks   WHAT YOU NEED TO KNOW:   What changes are happening in my body?  Now that you are in your second trimester, you have more energy. You may also feel hungrier than usual. You may start to experience other symptoms, such as heartburn or dizziness. You may be gaining about ½ to 1 pound a week, and your pregnancy is beginning to show. You may need to start wearing maternity clothes.  How do I care for myself at this stage of my pregnancy?       Manage heartburn  by eating 4 or 5 small meals each day instead of large meals. Avoid spicy foods. Avoid eating right before bedtime.         Manage nausea and vomiting.  Avoid fatty and spicy foods. Eat small meals throughout the day instead of large meals. Mary may help to decrease nausea. Ask your healthcare provider about other ways of decreasing nausea and vomiting.    Eat a variety of healthy foods.  Healthy foods include fruits, vegetables, whole-grain breads, low-fat dairy foods, beans, lean meats, and fish. Drink liquids as directed. Ask how much liquid to drink each day and which liquids are best for you. Limit caffeine to less than 200 milligrams each day. Limit your intake of fish to 2 servings each week. Choose fish low in mercury such as canned light tuna, shrimp, salmon, cod, or tilapia. Do not  eat fish high in mercury such as swordfish, tilefish, valeria mackerel, and shark.         Take prenatal vitamins as directed.  Your need for certain vitamins and minerals, such as folic acid, increases during pregnancy. Prenatal vitamins provide some of the extra vitamins and minerals you need. Prenatal vitamins may also help to decrease the risk of certain birth defects.         Do not smoke.  Smoking increases your risk of a miscarriage and other health problems during your pregnancy. Smoking can cause your baby to be born too early or weigh less at birth. Ask your healthcare provider for information if you need help quitting.    Do not drink  alcohol.  Alcohol passes from your body to your baby through the placenta. It can affect your baby's brain development and cause fetal alcohol syndrome (FAS). FAS is a group of conditions that causes mental, behavior, and growth problems.    Talk to your healthcare provider before you take any medicines.  Many medicines may harm your baby if you take them when you are pregnant. Do not take any medicines, vitamins, herbs, or supplements without first talking to your healthcare provider. Never use illegal or street drugs (such as marijuana or cocaine) while you are pregnant.  What are some safety tips during pregnancy?   Avoid hot tubs and saunas.  Do not use a hot tub or sauna while you are pregnant, especially during your first trimester. Hot tubs and saunas may raise your baby's temperature and increase the risk of birth defects.    Avoid toxoplasmosis.  This is an infection caused by eating raw meat or being around infected cat feces. It can cause birth defects, miscarriages, and other problems. Wash your hands after you touch raw meat. Make sure any meat is well-cooked before you eat it. Avoid raw eggs and unpasteurized milk. Use gloves or ask someone else to clean your cat's litter box while you are pregnant.    What changes are happening with my baby?  By 18 weeks, your baby may be about 6 inches long from the top of the head to the rump (baby's bottom). Your baby may weigh about 11 ounces. You may be able to feel your baby's movement at about 18 weeks or later. The first movements may not be that noticeable. They may feel like a fluttering sensation. Your baby also makes sucking movements and can hear certain sounds.  What do I need to know about prenatal care?  During the first 28 weeks of your pregnancy, you will see your healthcare provider once a month. Your healthcare provider will check your blood pressure and weight. You may also need any of the following:  A urine test  may also be done to check for  sugar and protein. These can be signs of gestational diabetes or infection.    A blood test  may be done to check for anemia (low iron level).    Fundal height check  is a measurement of your uterus to check your baby's growth. This number is usually the same as the number of weeks that you have been pregnant.    An ultrasound  may be done to check your baby's development. Your healthcare provider may be able to tell you what your baby's gender is during the ultrasound.         Your baby's heart rate  will be checked.    When should I seek immediate care?   You have pain or cramping in your abdomen or low back.    You have heavy vaginal bleeding or clotting.    You pass material that looks like tissue or large clots. Collect the material and bring it with you.    When should I call my doctor or obstetrician?   You cannot keep food or drinks down, and you are losing weight.    You have light bleeding.    You have chills or a fever.    You have vaginal itching, burning, or pain.    You have yellow, green, white, or foul-smelling vaginal discharge.    You have pain or burning when you urinate, less urine than usual, or pink or bloody urine.    You have questions or concerns about your condition or care.    CARE AGREEMENT:   You have the right to help plan your care. Learn about your health condition and how it may be treated. Discuss treatment options with your healthcare providers to decide what care you want to receive. You always have the right to refuse treatment. The above information is an  only. It is not intended as medical advice for individual conditions or treatments. Talk to your doctor, nurse or pharmacist before following any medical regimen to see if it is safe and effective for you.  © Copyright [employer] 2023 Information is for End User's use only and may not be sold, redistributed or otherwise used for commercial purposes.

## 2024-02-07 ENCOUNTER — ROUTINE PRENATAL (OUTPATIENT)
Dept: OBGYN CLINIC | Facility: CLINIC | Age: 24
End: 2024-02-07

## 2024-02-07 VITALS
OXYGEN SATURATION: 97 % | WEIGHT: 116 LBS | DIASTOLIC BLOOD PRESSURE: 62 MMHG | BODY MASS INDEX: 19.81 KG/M2 | HEART RATE: 85 BPM | SYSTOLIC BLOOD PRESSURE: 100 MMHG | HEIGHT: 64 IN

## 2024-02-07 DIAGNOSIS — Z82.0 FAMILY HISTORY OF CEREBRAL PALSY: ICD-10-CM

## 2024-02-07 DIAGNOSIS — Z34.80 ENCOUNTER FOR SUPERVISION OF OTHER NORMAL PREGNANCY, UNSPECIFIED TRIMESTER: ICD-10-CM

## 2024-02-07 DIAGNOSIS — Z36.1 NEED FOR MATERNAL SERUM ALPHA-PROTEIN (MSAFP) SCREENING: ICD-10-CM

## 2024-02-07 DIAGNOSIS — Z34.92 SECOND TRIMESTER PREGNANCY: Primary | ICD-10-CM

## 2024-02-07 DIAGNOSIS — Z3A.15 15 WEEKS GESTATION OF PREGNANCY: ICD-10-CM

## 2024-02-07 PROCEDURE — PNV: Performed by: OBSTETRICS & GYNECOLOGY

## 2024-03-07 ENCOUNTER — ROUTINE PRENATAL (OUTPATIENT)
Dept: OBGYN CLINIC | Facility: CLINIC | Age: 24
End: 2024-03-07

## 2024-03-07 VITALS
HEIGHT: 64 IN | WEIGHT: 127 LBS | BODY MASS INDEX: 21.68 KG/M2 | HEART RATE: 100 BPM | SYSTOLIC BLOOD PRESSURE: 112 MMHG | OXYGEN SATURATION: 98 % | DIASTOLIC BLOOD PRESSURE: 62 MMHG

## 2024-03-07 DIAGNOSIS — O09.899 RUBELLA NON-IMMUNE STATUS, ANTEPARTUM: ICD-10-CM

## 2024-03-07 DIAGNOSIS — Z67.91 RH NEGATIVE STATE IN ANTEPARTUM PERIOD: ICD-10-CM

## 2024-03-07 DIAGNOSIS — Z82.0 FAMILY HISTORY OF CEREBRAL PALSY: Primary | ICD-10-CM

## 2024-03-07 DIAGNOSIS — Z28.39 RUBELLA NON-IMMUNE STATUS, ANTEPARTUM: ICD-10-CM

## 2024-03-07 DIAGNOSIS — Z3A.20 20 WEEKS GESTATION OF PREGNANCY: ICD-10-CM

## 2024-03-07 DIAGNOSIS — O26.899 RH NEGATIVE STATE IN ANTEPARTUM PERIOD: ICD-10-CM

## 2024-03-07 DIAGNOSIS — Z34.80 ENCOUNTER FOR SUPERVISION OF OTHER NORMAL PREGNANCY, UNSPECIFIED TRIMESTER: ICD-10-CM

## 2024-03-07 PROCEDURE — PNV: Performed by: OBSTETRICS & GYNECOLOGY

## 2024-03-07 NOTE — PROGRESS NOTES
"    S: 23 y.o.  20w0d here for routine prenatal visit.        Chief Complaint   Patient presents with    Routine Prenatal Visit     No concerns         OB complaints:  Contractions: no  Leakage: no  Bleeding: no  Fetal movement: not yet feeling       O:  /62   Pulse 100   Ht 5' 4\" (1.626 m)   Wt 57.6 kg (127 lb)   LMP 10/19/2023 (Exact Date)   SpO2 98%   BMI 21.80 kg/m²       Review of Systems   Constitutional:  Negative for chills and fever.   Eyes:  Negative for visual disturbance.   Respiratory:  Negative for chest tightness and shortness of breath.    Cardiovascular:  Negative for chest pain.   Gastrointestinal:  Negative for abdominal pain, diarrhea, nausea and vomiting.   Genitourinary:  Negative for pelvic pain and vaginal bleeding.        As noted in HPI   Skin:  Negative for rash.   Neurological:  Negative for headaches.   All other systems reviewed and are negative.        Physical Exam  Constitutional:       General: She is not in acute distress.     Appearance: Normal appearance.   HENT:      Head: Normocephalic and atraumatic.   Cardiovascular:      Rate and Rhythm: Normal rate.   Pulmonary:      Effort: Pulmonary effort is normal. No respiratory distress.   Abdominal:      General: There is no distension.      Palpations: Abdomen is soft.      Tenderness: There is no abdominal tenderness. There is no guarding or rebound.      Comments: gravid   Neurological:      General: No focal deficit present.      Mental Status: She is alert.   Psychiatric:         Mood and Affect: Mood normal.         Behavior: Behavior normal.   Vitals and nursing note reviewed.              Fetal Heart Rate: 145    Pregravid Weight/BMI: 50.8 kg (112 lb) (BMI 19.22)  Current Weight: 57.6 kg (127 lb)   Total Weight Gain: 6.804 kg (15 lb)     Pre-Cristin Vitals      Flowsheet Row Most Recent Value   Prenatal Assessment    Fetal Heart Rate 145   Movement Absent   Prenatal Vitals    Blood Pressure 112/62   Weight - " Scale 57.6 kg (127 lb)   Urine Albumin/Glucose    Dilation/Effacement/Station    Vaginal Drainage    Edema                     Problem List       Encounter for supervision of other normal pregnancy, unspecified trimester    Overview     CF/ SMA testing: negative carrier  Flu vaccine: received 12/18/23  Covid vaccine:  completed X 2  NIPT low risk  - msAFP not done         Current Assessment & Plan     Anatomy US scheduled next week  msAFP not yet done - reviewed indication, she will try to complete ASAP   OB precautions reviewed         20 weeks gestation of pregnancy    Family history of cerebral palsy    Overview     Genetic counseling declined after discussion at [facility] appt          Rh negative state in antepartum period    Overview     Will need rhogam at 28 wks         Rubella non-immune status, antepartum    Overview     Will need postpartum exam                              Seema Last MD  3/7/2024  11:15 AM

## 2024-03-07 NOTE — ASSESSMENT & PLAN NOTE
Anatomy US scheduled next week  msAFP not yet done - reviewed indication, she will try to complete ASAP   OB precautions reviewed

## 2024-03-12 ENCOUNTER — ROUTINE PRENATAL (OUTPATIENT)
Dept: PERINATAL CARE | Facility: OTHER | Age: 24
End: 2024-03-12
Payer: COMMERCIAL

## 2024-03-12 VITALS
DIASTOLIC BLOOD PRESSURE: 58 MMHG | HEIGHT: 64 IN | HEART RATE: 86 BPM | WEIGHT: 127 LBS | BODY MASS INDEX: 21.68 KG/M2 | SYSTOLIC BLOOD PRESSURE: 102 MMHG

## 2024-03-12 DIAGNOSIS — Z36.86 ENCOUNTER FOR ANTENATAL SCREENING FOR CERVICAL LENGTH: ICD-10-CM

## 2024-03-12 DIAGNOSIS — Z36.3 ENCOUNTER FOR ANTENATAL SCREENING FOR MALFORMATIONS: Primary | ICD-10-CM

## 2024-03-12 DIAGNOSIS — Z3A.20 20 WEEKS GESTATION OF PREGNANCY: ICD-10-CM

## 2024-03-12 DIAGNOSIS — O35.BXX0 FETAL VENTRICULAR SEPTAL DEFECT AFFECTING ANTEPARTUM CARE OF MOTHER, SINGLE OR UNSPECIFIED FETUS: ICD-10-CM

## 2024-03-12 PROCEDURE — 99213 OFFICE O/P EST LOW 20 MIN: CPT | Performed by: OBSTETRICS & GYNECOLOGY

## 2024-03-12 PROCEDURE — 76811 OB US DETAILED SNGL FETUS: CPT | Performed by: OBSTETRICS & GYNECOLOGY

## 2024-03-12 PROCEDURE — 76817 TRANSVAGINAL US OBSTETRIC: CPT | Performed by: OBSTETRICS & GYNECOLOGY

## 2024-03-12 NOTE — LETTER
"   Date: 3/12/2024    Joselyn Resendiz MD  [address]  [address]  [place] [id number]    Patient: Ana Banuelos   YOB: 2000   Date of Visit: 3/12/2024   Gestational age 21w0d   Nature of this communication: Routine though please note we found a VSD and she is seeing pediatric cardiology for further evaluation.       Dear Joselyn and Juliocesar,    This patient was seen recently in our  office.  Please see ultrasound report under \"OB Procedures\" tab.  Please don't hesitate to contact our office with any concerns or questions.      Sincerely,      Jennifer Paez MD  Attending Physician, Maternal-Fetal Medicine  [facility]      "

## 2024-03-12 NOTE — PROGRESS NOTES
Ultrasound Probe Disinfection    A transvaginal ultrasound was performed.   Prior to use, disinfection was performed with High Level Disinfection Process ([employer]).  Probe serial number F2: [id number] was used.      Lin Restrepo  03/12/24  10:33 AM

## 2024-03-14 PROBLEM — O35.BXX0 FETAL VENTRICULAR SEPTAL DEFECT AFFECTING ANTEPARTUM CARE OF MOTHER: Status: ACTIVE | Noted: 2024-03-14

## 2024-03-14 NOTE — PROGRESS NOTES
"[facility]: Ms. Banuelos was seen today for anatomic survey and cervical length screening ultrasound.  See ultrasound report under \"OB Procedures\" tab.   The time spent on this established patient on the encounter date included 5 minutes previsit service time reviewing records and precharting, 10 minutes face-to-face service time counseling regarding results and coordinating care, and  5 minutes charting, totalling 20 minutes.  Please don't hesitate to contact our office with any concerns or questions.  -Jennifer Paez MD      "

## 2024-04-02 ENCOUNTER — APPOINTMENT (OUTPATIENT)
Age: 24
End: 2024-04-02
Payer: COMMERCIAL

## 2024-04-02 DIAGNOSIS — Z34.92 SECOND TRIMESTER PREGNANCY: ICD-10-CM

## 2024-04-02 DIAGNOSIS — Z36.1 NEED FOR MATERNAL SERUM ALPHA-PROTEIN (MSAFP) SCREENING: ICD-10-CM

## 2024-04-02 DIAGNOSIS — Z3A.15 15 WEEKS GESTATION OF PREGNANCY: ICD-10-CM

## 2024-04-02 PROCEDURE — 82105 ALPHA-FETOPROTEIN SERUM: CPT

## 2024-04-02 PROCEDURE — 36415 COLL VENOUS BLD VENIPUNCTURE: CPT

## 2024-04-03 ENCOUNTER — TELEPHONE (OUTPATIENT)
Age: 24
End: 2024-04-03

## 2024-04-03 NOTE — TELEPHONE ENCOUNTER
Ana called asking if she has any special instructions for tomorrow's glucose testing.  Per review of chart, currently 23w6d scheduled for routine OB visit tomorrow.  Reviewed she will receive 28 week lab orders at tomorrows visit including glucose testing.  She has completed MSAFP-results pending.

## 2024-04-04 ENCOUNTER — ROUTINE PRENATAL (OUTPATIENT)
Dept: OBGYN CLINIC | Facility: CLINIC | Age: 24
End: 2024-04-04

## 2024-04-04 VITALS
OXYGEN SATURATION: 98 % | WEIGHT: 126.7 LBS | HEART RATE: 108 BPM | SYSTOLIC BLOOD PRESSURE: 118 MMHG | BODY MASS INDEX: 21.63 KG/M2 | DIASTOLIC BLOOD PRESSURE: 58 MMHG | HEIGHT: 64 IN

## 2024-04-04 DIAGNOSIS — Z67.91 RH NEGATIVE STATE IN ANTEPARTUM PERIOD: ICD-10-CM

## 2024-04-04 DIAGNOSIS — Z82.0 FAMILY HISTORY OF CEREBRAL PALSY: ICD-10-CM

## 2024-04-04 DIAGNOSIS — Z3A.24 24 WEEKS GESTATION OF PREGNANCY: ICD-10-CM

## 2024-04-04 DIAGNOSIS — Z28.39 RUBELLA NON-IMMUNE STATUS, ANTEPARTUM: ICD-10-CM

## 2024-04-04 DIAGNOSIS — O26.899 RH NEGATIVE STATE IN ANTEPARTUM PERIOD: ICD-10-CM

## 2024-04-04 DIAGNOSIS — O09.899 RUBELLA NON-IMMUNE STATUS, ANTEPARTUM: ICD-10-CM

## 2024-04-04 DIAGNOSIS — Z34.80 ENCOUNTER FOR SUPERVISION OF OTHER NORMAL PREGNANCY, UNSPECIFIED TRIMESTER: Primary | ICD-10-CM

## 2024-04-04 LAB
2ND TRIMESTER 4 SCREEN SERPL-IMP: NORMAL
AFP ADJ MOM SERPL: 0.94
AFP INTERP AMN-IMP: NORMAL
AFP INTERP SERPL-IMP: NORMAL
AFP INTERP SERPL-IMP: NORMAL
AFP SERPL-MCNC: 107.6 NG/ML
AGE AT DELIVERY: 25.8 YR
GA METHOD: NORMAL
GA: 23.7 WEEKS
IDDM PATIENT QL: NO
MULTIPLE PREGNANCY: NO
NEURAL TUBE DEFECT RISK FETUS: NORMAL %

## 2024-04-04 PROCEDURE — PNV: Performed by: PHYSICIAN ASSISTANT

## 2024-04-04 NOTE — PROGRESS NOTES
24w0d pregnant female, here for prenatal visit. Pt well, without complaints.     Blood type: A negative    Lab slip given for RPR, CBC , anemia OB reflex & 1 hour GTT to be completed prior to next visit.  Antibody screen ordered with anticipation of rhogam at next visit.   Reviewed recommendation for Tdap at 28 weeks gestation.        Current Outpatient Medications:     Prenatal Vit-Fe Fumarate-FA (PRENATAL 1+1 PO), Take by mouth, Disp: , Rfl:     Pregravid Weight/BMI: 50.8 kg (112 lb) (BMI 19.22)  Current Weight:     Total Weight Gain: 6.668 kg (14 lb 11.2 oz)          Vitals:    04/04/24 1325   BP: 118/58   Pulse: (!) 108   SpO2: 98%       Fundal height: 24  Fetal Heart Rate: 154      Problem List          Cardiovascular and Mediastinum    Fetal ventricular septal defect affecting antepartum care of mother          Fetal echo scheduled 4/9/24         Nervous and Auditory    Family history of cerebral palsy    Overview     Genetic counseling declined after discussion at [facility] appt             Blood    Rh negative state in antepartum period    Overview     Will need rhogam at 28 wks            Obstetrics/Gynecology    Encounter for supervision of other normal pregnancy, unspecified trimester    Overview     CF/ SMA testing: negative carrier  Flu vaccine: received 12/18/23  Covid vaccine:  completed X 2  NIPT low risk  - msAFP not done         24 weeks gestation of pregnancy    Rubella non-immune status, antepartum    Overview     Will need postpartum vaccine            Ob visit in 4 wks  Fetal echo scheduled 4/9/24   scheduled 5/7/24  Lab slip given for RPR, CBC , anemia OB reflex & 1 hour GTT to be completed prior to next visit.

## 2024-04-09 ENCOUNTER — ROUTINE PRENATAL (OUTPATIENT)
Age: 24
End: 2024-04-09

## 2024-04-09 DIAGNOSIS — O35.BXX1 FETAL VENTRICULAR SEPTAL DEFECT AFFECTING ANTEPARTUM CARE OF MOTHER, FETUS 1: Primary | ICD-10-CM

## 2024-04-09 DIAGNOSIS — O35.BXX0 FETAL VENTRICULAR SEPTAL DEFECT AFFECTING ANTEPARTUM CARE OF MOTHER, SINGLE OR UNSPECIFIED FETUS: ICD-10-CM

## 2024-04-09 NOTE — PROGRESS NOTES
Fetal Cardiology Consult    Date of Visit:             2024  Gestational Age:           24w5d   Estimated Date of Delivery:  24   Referring provider:                Astrid     Reason for consultation: VSD    Fetal Echocardiogram demonstrated muscular ventricular septal defect with otherwise normal cardiac anatomy and function.    I reviewed the abnormal findings, natural history of the heart lesion, possible interventions, and need for further imaging.. We also discussed, that due to the technical limitations of fetal echocardiography and the nature of fetal circulation, a number of cardiovascular defects cannot be definitively ruled out at this stage.  Examples include but are not limited to: ASD, PDA, small VSD, coarctation of the aorta, partial anomalous pulmonary venous connection. Please see full echocardiogram report under OB procedures.    Assessment and Recommendations:  -There is a muscular VSD with otherwise normal cardiac anatomy.   -Normal prenatal care, delivery, and  care are recommended.   -Recommend  outpatient cardiology consult with echocardiogram at 1-2 weeks of life (Please schedule appointment prior to hospital discharge. Phone [phone]).     I spent 60 minutes - on the day of service - reviewing the patient's chart, reviewing previous imaging studies, counseling the patient about the fetal findings, coordinating care, and documenting care.     Alonzo Fisher MD  Pediatric Cardiology  [facility]  Phone:[phone]  Fax: [phone]  [email or web address]

## 2024-04-18 ENCOUNTER — APPOINTMENT (OUTPATIENT)
Age: 24
End: 2024-04-18
Payer: COMMERCIAL

## 2024-04-18 DIAGNOSIS — Z34.80 ENCOUNTER FOR SUPERVISION OF OTHER NORMAL PREGNANCY, UNSPECIFIED TRIMESTER: ICD-10-CM

## 2024-04-18 DIAGNOSIS — Z3A.24 24 WEEKS GESTATION OF PREGNANCY: ICD-10-CM

## 2024-04-18 LAB
BASOPHILS # BLD AUTO: 0.02 THOUSANDS/ÂΜL (ref 0–0.1)
BASOPHILS NFR BLD AUTO: 0 % (ref 0–1)
BLD GP AB SCN SERPL QL: NEGATIVE
EOSINOPHIL # BLD AUTO: 0.07 THOUSAND/ÂΜL (ref 0–0.61)
EOSINOPHIL NFR BLD AUTO: 1 % (ref 0–6)
ERYTHROCYTE [DISTWIDTH] IN BLOOD BY AUTOMATED COUNT: 12.4 % (ref 11.6–15.1)
GLUCOSE 1H P 50 G GLC PO SERPL-MCNC: 81 MG/DL (ref 70–134)
HCT VFR BLD AUTO: 33 % (ref 34.8–46.1)
HGB BLD-MCNC: 10.1 G/DL (ref 11.5–15.4)
IMM GRANULOCYTES # BLD AUTO: 0.04 THOUSAND/UL (ref 0–0.2)
IMM GRANULOCYTES NFR BLD AUTO: 1 % (ref 0–2)
LYMPHOCYTES # BLD AUTO: 0.74 THOUSANDS/ÂΜL (ref 0.6–4.47)
LYMPHOCYTES NFR BLD AUTO: 10 % (ref 14–44)
MCH RBC QN AUTO: 27.2 PG (ref 26.8–34.3)
MCHC RBC AUTO-ENTMCNC: 30.6 G/DL (ref 31.4–37.4)
MCV RBC AUTO: 89 FL (ref 82–98)
MONOCYTES # BLD AUTO: 0.39 THOUSAND/ÂΜL (ref 0.17–1.22)
MONOCYTES NFR BLD AUTO: 5 % (ref 4–12)
NEUTROPHILS # BLD AUTO: 6.25 THOUSANDS/ÂΜL (ref 1.85–7.62)
NEUTS SEG NFR BLD AUTO: 83 % (ref 43–75)
NRBC BLD AUTO-RTO: 0 /100 WBCS
PLATELET # BLD AUTO: 203 THOUSANDS/UL (ref 149–390)
PMV BLD AUTO: 11.7 FL (ref 8.9–12.7)
RBC # BLD AUTO: 3.71 MILLION/UL (ref 3.81–5.12)
TREPONEMA PALLIDUM IGG+IGM AB [PRESENCE] IN SERUM OR PLASMA BY IMMUNOASSAY: NORMAL
WBC # BLD AUTO: 7.51 THOUSAND/UL (ref 4.31–10.16)

## 2024-04-18 PROCEDURE — 36415 COLL VENOUS BLD VENIPUNCTURE: CPT

## 2024-04-18 PROCEDURE — 82950 GLUCOSE TEST: CPT

## 2024-04-18 PROCEDURE — 86850 RBC ANTIBODY SCREEN: CPT

## 2024-04-18 PROCEDURE — 86780 TREPONEMA PALLIDUM: CPT

## 2024-04-18 PROCEDURE — 82728 ASSAY OF FERRITIN: CPT

## 2024-04-18 PROCEDURE — 85025 COMPLETE CBC W/AUTO DIFF WBC: CPT

## 2024-04-19 ENCOUNTER — TELEPHONE (OUTPATIENT)
Dept: OBGYN CLINIC | Facility: CLINIC | Age: 24
End: 2024-04-19

## 2024-04-19 DIAGNOSIS — D64.9 ANEMIA, UNSPECIFIED TYPE: Primary | ICD-10-CM

## 2024-04-19 LAB — FERRITIN SERPL-MCNC: 3 NG/ML (ref 11–307)

## 2024-04-19 NOTE — TELEPHONE ENCOUNTER
Left message on machine for pt to call office re: low hemoglobin, ferritin levels    Confirm any hx of anemia?    Recommend patient start iron supplement in addition to PNV.    Ferrous sulfate 324mg (65 elemental iron) every other day.   Recommend repeat CBC in 4 wks.

## 2024-04-19 NOTE — TELEPHONE ENCOUNTER
Reviewed Juliocesar Pizarro's notes with patient.  She states she has no history of anemia.      Recommendations for her to begin taking iron supplements given along with the dose.  Advised patient to take with orange juice for increased absorption.  Informed patient she will need to repeat CBC in 4 weeks.  She verbalized understanding.

## 2024-04-19 NOTE — TELEPHONE ENCOUNTER
Patient called to confirm dosage of iron supplementation.  Confirmed 324 mg tablets of ferrous sulfate.  Patient verbalized understanding.

## 2024-05-02 ENCOUNTER — ROUTINE PRENATAL (OUTPATIENT)
Dept: OBGYN CLINIC | Facility: CLINIC | Age: 24
End: 2024-05-02
Payer: COMMERCIAL

## 2024-05-02 VITALS
BODY MASS INDEX: 24.21 KG/M2 | DIASTOLIC BLOOD PRESSURE: 58 MMHG | HEIGHT: 64 IN | WEIGHT: 141.8 LBS | OXYGEN SATURATION: 98 % | SYSTOLIC BLOOD PRESSURE: 118 MMHG | HEART RATE: 97 BPM

## 2024-05-02 DIAGNOSIS — Z34.83 ENCOUNTER FOR SUPERVISION OF OTHER NORMAL PREGNANCY, THIRD TRIMESTER: Primary | ICD-10-CM

## 2024-05-02 DIAGNOSIS — Z67.91 RH NEGATIVE STATE IN ANTEPARTUM PERIOD: ICD-10-CM

## 2024-05-02 DIAGNOSIS — Z28.39 RUBELLA NON-IMMUNE STATUS, ANTEPARTUM: ICD-10-CM

## 2024-05-02 DIAGNOSIS — Z82.0 FAMILY HISTORY OF CEREBRAL PALSY: ICD-10-CM

## 2024-05-02 DIAGNOSIS — O09.899 RUBELLA NON-IMMUNE STATUS, ANTEPARTUM: ICD-10-CM

## 2024-05-02 DIAGNOSIS — Z23 ENCOUNTER FOR IMMUNIZATION: ICD-10-CM

## 2024-05-02 DIAGNOSIS — Z3A.28 28 WEEKS GESTATION OF PREGNANCY: ICD-10-CM

## 2024-05-02 DIAGNOSIS — O26.899 RH NEGATIVE STATE IN ANTEPARTUM PERIOD: ICD-10-CM

## 2024-05-02 PROCEDURE — 90471 IMMUNIZATION ADMIN: CPT | Performed by: PHYSICIAN ASSISTANT

## 2024-05-02 PROCEDURE — PNV: Performed by: PHYSICIAN ASSISTANT

## 2024-05-02 PROCEDURE — 96372 THER/PROPH/DIAG INJ SC/IM: CPT | Performed by: PHYSICIAN ASSISTANT

## 2024-05-02 PROCEDURE — 90715 TDAP VACCINE 7 YRS/> IM: CPT | Performed by: PHYSICIAN ASSISTANT

## 2024-05-02 RX ORDER — FERROUS SULFATE 324(65)MG
TABLET, DELAYED RELEASE (ENTERIC COATED) ORAL
COMMUNITY
Start: 2024-04-23

## 2024-05-07 ENCOUNTER — ULTRASOUND (OUTPATIENT)
Dept: PERINATAL CARE | Facility: OTHER | Age: 24
End: 2024-05-07
Payer: COMMERCIAL

## 2024-05-07 VITALS
DIASTOLIC BLOOD PRESSURE: 60 MMHG | WEIGHT: 143 LBS | SYSTOLIC BLOOD PRESSURE: 98 MMHG | HEIGHT: 64 IN | HEART RATE: 97 BPM | BODY MASS INDEX: 24.41 KG/M2

## 2024-05-07 DIAGNOSIS — O35.BXX0 FETAL VENTRICULAR SEPTAL DEFECT AFFECTING ANTEPARTUM CARE OF MOTHER, SINGLE OR UNSPECIFIED FETUS: ICD-10-CM

## 2024-05-07 DIAGNOSIS — Z36.89 ENCOUNTER FOR ULTRASOUND TO CHECK FETAL GROWTH: ICD-10-CM

## 2024-05-07 DIAGNOSIS — Z3A.28 28 WEEKS GESTATION OF PREGNANCY: Primary | ICD-10-CM

## 2024-05-07 DIAGNOSIS — Z36.2 ENCOUNTER FOR FOLLOW-UP ULTRASOUND OF FETAL ANATOMY: ICD-10-CM

## 2024-05-07 PROCEDURE — 76816 OB US FOLLOW-UP PER FETUS: CPT | Performed by: STUDENT IN AN ORGANIZED HEALTH CARE EDUCATION/TRAINING PROGRAM

## 2024-05-07 PROCEDURE — 99213 OFFICE O/P EST LOW 20 MIN: CPT | Performed by: STUDENT IN AN ORGANIZED HEALTH CARE EDUCATION/TRAINING PROGRAM

## 2024-05-07 NOTE — PROGRESS NOTES
"[facility]: Ms. Banuelos was seen today for fetal growth and followup missed anatomy ultrasound.  See ultrasound report under \"OB Procedures\" tab.   The time spent on this established patient on the encounter date included 5 minutes previsit service time reviewing records and precharting, 5 minutes face-to-face service time counseling regarding results and coordinating care, and  5 minutes charting, totalling 15 minutes.  Please don't hesitate to contact our office with any concerns or questions.  -Sandra Hayes MD    "

## 2024-05-21 ENCOUNTER — APPOINTMENT (OUTPATIENT)
Age: 24
End: 2024-05-21
Payer: COMMERCIAL

## 2024-05-21 DIAGNOSIS — D64.9 ANEMIA, UNSPECIFIED TYPE: ICD-10-CM

## 2024-05-21 LAB
BASOPHILS # BLD AUTO: 0.01 THOUSANDS/ÂΜL (ref 0–0.1)
BASOPHILS NFR BLD AUTO: 0 % (ref 0–1)
EOSINOPHIL # BLD AUTO: 0.06 THOUSAND/ÂΜL (ref 0–0.61)
EOSINOPHIL NFR BLD AUTO: 1 % (ref 0–6)
ERYTHROCYTE [DISTWIDTH] IN BLOOD BY AUTOMATED COUNT: 14 % (ref 11.6–15.1)
FERRITIN SERPL-MCNC: 3 NG/ML (ref 11–307)
HCT VFR BLD AUTO: 29.4 % (ref 34.8–46.1)
HGB BLD-MCNC: 9.1 G/DL (ref 11.5–15.4)
IMM GRANULOCYTES # BLD AUTO: 0.06 THOUSAND/UL (ref 0–0.2)
IMM GRANULOCYTES NFR BLD AUTO: 1 % (ref 0–2)
LYMPHOCYTES # BLD AUTO: 0.94 THOUSANDS/ÂΜL (ref 0.6–4.47)
LYMPHOCYTES NFR BLD AUTO: 12 % (ref 14–44)
MCH RBC QN AUTO: 25.7 PG (ref 26.8–34.3)
MCHC RBC AUTO-ENTMCNC: 31 G/DL (ref 31.4–37.4)
MCV RBC AUTO: 83 FL (ref 82–98)
MONOCYTES # BLD AUTO: 0.66 THOUSAND/ÂΜL (ref 0.17–1.22)
MONOCYTES NFR BLD AUTO: 8 % (ref 4–12)
NEUTROPHILS # BLD AUTO: 6.41 THOUSANDS/ÂΜL (ref 1.85–7.62)
NEUTS SEG NFR BLD AUTO: 78 % (ref 43–75)
NRBC BLD AUTO-RTO: 0 /100 WBCS
PLATELET # BLD AUTO: 236 THOUSANDS/UL (ref 149–390)
PMV BLD AUTO: 11.2 FL (ref 8.9–12.7)
RBC # BLD AUTO: 3.54 MILLION/UL (ref 3.81–5.12)
WBC # BLD AUTO: 8.14 THOUSAND/UL (ref 4.31–10.16)

## 2024-05-21 PROCEDURE — 83540 ASSAY OF IRON: CPT

## 2024-05-21 PROCEDURE — 36415 COLL VENOUS BLD VENIPUNCTURE: CPT

## 2024-05-21 PROCEDURE — 83550 IRON BINDING TEST: CPT

## 2024-05-21 PROCEDURE — 85025 COMPLETE CBC W/AUTO DIFF WBC: CPT

## 2024-05-21 PROCEDURE — 82728 ASSAY OF FERRITIN: CPT

## 2024-05-23 ENCOUNTER — TELEPHONE (OUTPATIENT)
Dept: OBGYN CLINIC | Facility: CLINIC | Age: 24
End: 2024-05-23

## 2024-05-23 ENCOUNTER — ROUTINE PRENATAL (OUTPATIENT)
Dept: OBGYN CLINIC | Facility: CLINIC | Age: 24
End: 2024-05-23

## 2024-05-23 VITALS
WEIGHT: 147.4 LBS | HEART RATE: 113 BPM | DIASTOLIC BLOOD PRESSURE: 62 MMHG | OXYGEN SATURATION: 99 % | BODY MASS INDEX: 25.16 KG/M2 | HEIGHT: 64 IN | SYSTOLIC BLOOD PRESSURE: 104 MMHG

## 2024-05-23 DIAGNOSIS — O99.013 ANEMIA OF PREGNANCY IN THIRD TRIMESTER: ICD-10-CM

## 2024-05-23 DIAGNOSIS — Z3A.31 31 WEEKS GESTATION OF PREGNANCY: Primary | ICD-10-CM

## 2024-05-23 DIAGNOSIS — Z67.91 RH NEGATIVE STATE IN ANTEPARTUM PERIOD: ICD-10-CM

## 2024-05-23 DIAGNOSIS — Z28.39 RUBELLA NON-IMMUNE STATUS, ANTEPARTUM: ICD-10-CM

## 2024-05-23 DIAGNOSIS — O26.899 RH NEGATIVE STATE IN ANTEPARTUM PERIOD: ICD-10-CM

## 2024-05-23 DIAGNOSIS — Z3A.31 31 WEEKS GESTATION OF PREGNANCY: ICD-10-CM

## 2024-05-23 DIAGNOSIS — D64.9 ANEMIA, UNSPECIFIED TYPE: Primary | ICD-10-CM

## 2024-05-23 DIAGNOSIS — Z82.0 FAMILY HISTORY OF CEREBRAL PALSY: ICD-10-CM

## 2024-05-23 DIAGNOSIS — Z34.83 ENCOUNTER FOR SUPERVISION OF OTHER NORMAL PREGNANCY, THIRD TRIMESTER: Primary | ICD-10-CM

## 2024-05-23 DIAGNOSIS — O09.899 RUBELLA NON-IMMUNE STATUS, ANTEPARTUM: ICD-10-CM

## 2024-05-23 LAB
IRON SERPL-MCNC: <10 UG/DL (ref 50–212)
UIBC SERPL-MCNC: >2250 UG/DL (ref 155–355)

## 2024-05-23 PROCEDURE — PNV: Performed by: PHYSICIAN ASSISTANT

## 2024-05-23 RX ORDER — SODIUM CHLORIDE 9 MG/ML
20 INJECTION, SOLUTION INTRAVENOUS ONCE
OUTPATIENT
Start: 2024-05-28

## 2024-05-23 NOTE — TELEPHONE ENCOUNTER
[facility] was called. Spoke with a representative who states he will add the TIBC panel onto the labs drawn on 5/21/24.

## 2024-05-23 NOTE — PROGRESS NOTES
Patient here for prenatal visit.  She is 31w0d pregnant.     Reviewed that Hgb has dropped from 10.1 to 9.1.  she has been taking the oral iron.  Discussed IV iron since level continues to drop, patient is agreeable.      Fetal movement: yes  Bleeding: no  Contractions: no  Edema: no    OB consent form signed:completed    Tdap : completed      Infant feeding intent: breast  Contraception plans: undecided      Current Outpatient Medications on File Prior to Visit   Medication Sig Dispense Refill    ferrous sulfate 324 MG TBEC       Prenatal Vit-Fe Fumarate-FA (PRENATAL 1+1 PO) Take by mouth       No current facility-administered medications on file prior to visit.       Pregravid Weight/BMI: 50.8 kg (112 lb) (BMI 19.22)  Current Weight:     Total Weight Gain: 16.1 kg (35 lb 6.4 oz)          Vitals:    24 1037   BP: 104/62   Pulse: (!) 113   SpO2: 99%   Repeat pulse:  98    Fundal height: 144  Fetal Heart Rate: 30      Problem List          Cardiovascular and Mediastinum    Fetal ventricular septal defect affecting antepartum care of mother    Overview     Fetal echo 24:  -There is a muscular VSD with otherwise normal cardiac anatomy.   -Normal prenatal care, delivery, and  care are recommended.   -Recommend  outpatient cardiology consult with echocardiogram at 1-2 weeks of life (Please schedule appointment prior to hospital discharge. Phone [phone]).            Nervous and Auditory    Family history of cerebral palsy    Overview     Genetic counseling declined after discussion at [facility] appt             Blood    Rh negative state in antepartum period    Overview     Will need rhogam at 28 wks- completed 24         Anemia    Overview     Iron supplements started at 26 weeks.  Recheck CBC in 4 wks- 9.1   Plan for iron infusions.            Obstetrics/Gynecology    Encounter for supervision of other normal pregnancy, third trimester    Overview     CF/ SMA testing: negative carrier  Flu  vaccine: received 23  Covid vaccine:  completed X 2  NIPT low risk  - msAFP not done         31 weeks gestation of pregnancy    Rubella non-immune status, antepartum    Overview     Will need postpartum vaccine            Will order IV iron and plan to recheck CBC after completion of iron series.  Ob visit in 2 weeks  US scheduled 24  Fetal kick counts and  labor precautions reviewed

## 2024-05-23 NOTE — PROGRESS NOTES
Anayusef Banuelos is 22pqb1x, here for her routine ob appt; pt denies any LOF, VB, or CTXs.  +FM: yes  Pt is feeling well, pulse was 113.

## 2024-06-06 ENCOUNTER — TELEPHONE (OUTPATIENT)
Dept: INFUSION CENTER | Facility: CLINIC | Age: 24
End: 2024-06-06

## 2024-06-06 NOTE — TELEPHONE ENCOUNTER
Called pt to confirm appt tomorrow at 8am. Left message with appt details including number to call back infusion center with any questions.

## 2024-06-07 ENCOUNTER — HOSPITAL ENCOUNTER (OUTPATIENT)
Dept: INFUSION CENTER | Facility: CLINIC | Age: 24
End: 2024-06-07
Payer: COMMERCIAL

## 2024-06-07 ENCOUNTER — ROUTINE PRENATAL (OUTPATIENT)
Age: 24
End: 2024-06-07

## 2024-06-07 VITALS
OXYGEN SATURATION: 99 % | BODY MASS INDEX: 25.78 KG/M2 | WEIGHT: 151 LBS | DIASTOLIC BLOOD PRESSURE: 72 MMHG | HEART RATE: 99 BPM | HEIGHT: 64 IN | SYSTOLIC BLOOD PRESSURE: 124 MMHG

## 2024-06-07 DIAGNOSIS — O35.BXX0 FETAL VENTRICULAR SEPTAL DEFECT AFFECTING ANTEPARTUM CARE OF MOTHER, SINGLE OR UNSPECIFIED FETUS: ICD-10-CM

## 2024-06-07 DIAGNOSIS — Z34.83 ENCOUNTER FOR SUPERVISION OF OTHER NORMAL PREGNANCY, THIRD TRIMESTER: Primary | ICD-10-CM

## 2024-06-07 DIAGNOSIS — Z3A.31 31 WEEKS GESTATION OF PREGNANCY: Primary | ICD-10-CM

## 2024-06-07 DIAGNOSIS — O99.013 ANEMIA OF PREGNANCY IN THIRD TRIMESTER: ICD-10-CM

## 2024-06-07 DIAGNOSIS — Z28.39 RUBELLA NON-IMMUNE STATUS, ANTEPARTUM: ICD-10-CM

## 2024-06-07 DIAGNOSIS — Z82.0 FAMILY HISTORY OF CEREBRAL PALSY: ICD-10-CM

## 2024-06-07 DIAGNOSIS — Z67.91 RH NEGATIVE STATE IN ANTEPARTUM PERIOD: ICD-10-CM

## 2024-06-07 DIAGNOSIS — Z3A.33 33 WEEKS GESTATION OF PREGNANCY: ICD-10-CM

## 2024-06-07 DIAGNOSIS — D64.9 ANEMIA, UNSPECIFIED TYPE: ICD-10-CM

## 2024-06-07 DIAGNOSIS — O26.899 RH NEGATIVE STATE IN ANTEPARTUM PERIOD: ICD-10-CM

## 2024-06-07 DIAGNOSIS — O09.899 RUBELLA NON-IMMUNE STATUS, ANTEPARTUM: ICD-10-CM

## 2024-06-07 PROCEDURE — PNV: Performed by: OBSTETRICS & GYNECOLOGY

## 2024-06-07 PROCEDURE — 96365 THER/PROPH/DIAG IV INF INIT: CPT

## 2024-06-07 RX ORDER — SODIUM CHLORIDE 9 MG/ML
20 INJECTION, SOLUTION INTRAVENOUS ONCE
Status: COMPLETED | OUTPATIENT
Start: 2024-06-07 | End: 2024-06-07

## 2024-06-07 RX ORDER — SODIUM CHLORIDE 9 MG/ML
20 INJECTION, SOLUTION INTRAVENOUS ONCE
OUTPATIENT
Start: 2024-06-10

## 2024-06-07 RX ADMIN — IRON SUCROSE 200 MG: 20 INJECTION, SOLUTION INTRAVENOUS at 08:44

## 2024-06-07 RX ADMIN — SODIUM CHLORIDE 20 ML/HR: 9 INJECTION, SOLUTION INTRAVENOUS at 08:44

## 2024-06-07 NOTE — PROGRESS NOTES
Ana Banuelos  tolerated first venofer treatment well with no complications.      Ana Banuelos is aware of future appt on Monday Kelby 10, 2024 8:30 AM.    AVS declined by Ana Banuelos.

## 2024-06-07 NOTE — PROGRESS NOTES
"    S: 23 y.o.  33w1d here for routine prenatal visit.        Chief Complaint   Patient presents with    Routine Prenatal Visit     No concerns          OB complaints:  Contractions: no  Leakage: no  Bleeding: no  Fetal movement: yes      O:  /72   Pulse 99   Ht 5' 4\" (1.626 m)   Wt 68.5 kg (151 lb)   LMP 10/19/2023 (Exact Date)   SpO2 99%   BMI 25.92 kg/m²       Review of Systems   Constitutional:  Negative for chills and fever.   Eyes:  Negative for visual disturbance.   Respiratory:  Negative for chest tightness and shortness of breath.    Cardiovascular:  Negative for chest pain.   Gastrointestinal:  Negative for abdominal pain, diarrhea, nausea and vomiting.   Genitourinary:  Negative for pelvic pain and vaginal bleeding.        As noted in HPI   Skin:  Negative for rash.   Neurological:  Negative for headaches.   All other systems reviewed and are negative.        Physical Exam  Constitutional:       General: She is not in acute distress.     Appearance: Normal appearance.   HENT:      Head: Normocephalic and atraumatic.   Cardiovascular:      Rate and Rhythm: Normal rate.   Pulmonary:      Effort: Pulmonary effort is normal. No respiratory distress.   Abdominal:      General: There is no distension.      Palpations: Abdomen is soft.      Tenderness: There is no abdominal tenderness. There is no guarding or rebound.      Comments: gravid   Neurological:      General: No focal deficit present.      Mental Status: She is alert.   Psychiatric:         Mood and Affect: Mood normal.         Behavior: Behavior normal.   Vitals and nursing note reviewed.           Fundal Height (cm): 32 cm  Fetal Heart Rate: 145    Pregravid Weight/BMI: 50.8 kg (112 lb) (BMI 19.22)  Current Weight: 68.5 kg (151 lb)   Total Weight Gain: 17.7 kg (39 lb)     Pre-Cristin Vitals      Flowsheet Row Most Recent Value   Prenatal Assessment    Fetal Heart Rate 145   Fundal Height (cm) 32 cm   Movement Present   Prenatal " Vitals    Blood Pressure 124/72   Weight - Scale 68.5 kg (151 lb)   Urine Albumin/Glucose    Dilation/Effacement/Station    Vaginal Drainage    Edema                     Problem List       Encounter for supervision of other normal pregnancy, third trimester    Overview     CF/ SMA testing: negative carrier  Flu vaccine: received 23  Covid vaccine:  completed X 2  NIPT low risk. msAFP neg  S/p tdap  Consents signed          Current Assessment & Plan     Has growth US upcoming  OB precautions reviewed         33 weeks gestation of pregnancy    Family history of cerebral palsy    Overview     Genetic counseling declined after discussion at [facility] appt          Rh negative state in antepartum period    Overview     Will need rhogam at 28 wks- completed 24         Rubella non-immune status, antepartum    Overview     Will need postpartum vaccine         Fetal ventricular septal defect affecting antepartum care of mother    Overview     Fetal echo 24:  -There is a muscular VSD with otherwise normal cardiac anatomy.   -Normal prenatal care, delivery, and  care are recommended.   -Recommend  outpatient cardiology consult with echocardiogram at 1-2 weeks of life (Please schedule appointment prior to hospital discharge. Phone [phone]).         Anemia    Overview     Iron supplements started at 26 weeks.  Recheck CBC in 4 wks         Anemia of pregnancy in third trimester    Current Assessment & Plan     Hgb 9.1 on , got first venofer infusion today                               Seema Last MD  2024  11:13 AM

## 2024-06-10 ENCOUNTER — HOSPITAL ENCOUNTER (OUTPATIENT)
Dept: INFUSION CENTER | Facility: CLINIC | Age: 24
Discharge: HOME | End: 2024-06-10
Payer: COMMERCIAL

## 2024-06-10 VITALS
HEART RATE: 82 BPM | TEMPERATURE: 97.6 F | RESPIRATION RATE: 18 BRPM | SYSTOLIC BLOOD PRESSURE: 116 MMHG | DIASTOLIC BLOOD PRESSURE: 70 MMHG

## 2024-06-10 DIAGNOSIS — Z3A.33 33 WEEKS GESTATION OF PREGNANCY: Primary | ICD-10-CM

## 2024-06-10 DIAGNOSIS — O99.013 ANEMIA OF PREGNANCY IN THIRD TRIMESTER: ICD-10-CM

## 2024-06-10 PROCEDURE — 96366 THER/PROPH/DIAG IV INF ADDON: CPT

## 2024-06-10 PROCEDURE — 96365 THER/PROPH/DIAG IV INF INIT: CPT

## 2024-06-10 RX ORDER — SODIUM CHLORIDE 9 MG/ML
20 INJECTION, SOLUTION INTRAVENOUS ONCE
Status: COMPLETED | OUTPATIENT
Start: 2024-06-10 | End: 2024-06-10

## 2024-06-10 RX ORDER — SODIUM CHLORIDE 9 MG/ML
20 INJECTION, SOLUTION INTRAVENOUS ONCE
Status: CANCELLED | OUTPATIENT
Start: 2024-06-13

## 2024-06-10 RX ADMIN — IRON SUCROSE 200 MG: 20 INJECTION, SOLUTION INTRAVENOUS at 08:56

## 2024-06-10 RX ADMIN — SODIUM CHLORIDE 20 ML/HR: 9 INJECTION, SOLUTION INTRAVENOUS at 08:57

## 2024-06-10 NOTE — PROGRESS NOTES
Ana Banuelos  tolerated treatment well with no complications.      Ana Banuelos is aware of future appt on Thursday Jun 13, 2024 2:30 PM.    AVS declined by Ana Banuelos.

## 2024-06-13 ENCOUNTER — HOSPITAL ENCOUNTER (OUTPATIENT)
Dept: INFUSION CENTER | Facility: CLINIC | Age: 24
Discharge: HOME | End: 2024-06-13
Payer: COMMERCIAL

## 2024-06-13 ENCOUNTER — ULTRASOUND (OUTPATIENT)
Dept: PERINATAL CARE | Facility: OTHER | Age: 24
End: 2024-06-13
Payer: COMMERCIAL

## 2024-06-13 VITALS
HEART RATE: 106 BPM | WEIGHT: 154.2 LBS | BODY MASS INDEX: 26.32 KG/M2 | DIASTOLIC BLOOD PRESSURE: 58 MMHG | HEIGHT: 64 IN | SYSTOLIC BLOOD PRESSURE: 122 MMHG

## 2024-06-13 VITALS
SYSTOLIC BLOOD PRESSURE: 117 MMHG | RESPIRATION RATE: 18 BRPM | TEMPERATURE: 98.3 F | DIASTOLIC BLOOD PRESSURE: 64 MMHG | HEART RATE: 96 BPM

## 2024-06-13 DIAGNOSIS — Z3A.34 34 WEEKS GESTATION OF PREGNANCY: Primary | ICD-10-CM

## 2024-06-13 DIAGNOSIS — O35.BXX0 FETAL VENTRICULAR SEPTAL DEFECT AFFECTING ANTEPARTUM CARE OF MOTHER, SINGLE OR UNSPECIFIED FETUS: Primary | ICD-10-CM

## 2024-06-13 DIAGNOSIS — Z3A.34 34 WEEKS GESTATION OF PREGNANCY: ICD-10-CM

## 2024-06-13 DIAGNOSIS — O99.013 ANEMIA OF PREGNANCY IN THIRD TRIMESTER: ICD-10-CM

## 2024-06-13 PROCEDURE — 99212 OFFICE O/P EST SF 10 MIN: CPT | Performed by: OBSTETRICS & GYNECOLOGY

## 2024-06-13 PROCEDURE — 96365 THER/PROPH/DIAG IV INF INIT: CPT

## 2024-06-13 PROCEDURE — 76816 OB US FOLLOW-UP PER FETUS: CPT | Performed by: OBSTETRICS & GYNECOLOGY

## 2024-06-13 RX ORDER — SODIUM CHLORIDE 9 MG/ML
20 INJECTION, SOLUTION INTRAVENOUS ONCE
Status: COMPLETED | OUTPATIENT
Start: 2024-06-13 | End: 2024-06-13

## 2024-06-13 RX ORDER — SODIUM CHLORIDE 9 MG/ML
20 INJECTION, SOLUTION INTRAVENOUS ONCE
Status: CANCELLED | OUTPATIENT
Start: 2024-06-17

## 2024-06-13 RX ADMIN — SODIUM CHLORIDE 20 ML/HR: 0.9 INJECTION, SOLUTION INTRAVENOUS at 14:44

## 2024-06-13 RX ADMIN — IRON SUCROSE 200 MG: 20 INJECTION, SOLUTION INTRAVENOUS at 14:50

## 2024-06-13 NOTE — PLAN OF CARE
Problem: Potential for Falls  Goal: Patient will remain free of falls  Description: INTERVENTIONS:  - Educate patient/family on patient safety including physical limitations  - Instruct patient to call for assistance with activity   - Consult OT/PT to assist with strengthening/mobility   - Keep Call bell within reach  - Keep bed low and locked with side rails adjusted as appropriate  - Keep care items and personal belongings within reach  - Initiate and maintain comfort rounds  - Make Fall Risk Sign visible to staff  - Offer Toileting every  Hours, in advance of need  - Initiate/Maintain alarm  - Obtain necessary fall risk management equipment:   - Apply yellow socks and bracelet for high fall risk patients  - Consider moving patient to room near nurses station  Reactivated

## 2024-06-13 NOTE — PROGRESS NOTES
Pt tolerated venofer infusion without incident.  Pt declined AVS but is aware of her next appt on June 17 at 1:00.

## 2024-06-13 NOTE — PROGRESS NOTES
The patient was seen today for an ultrasound.  Please see ultrasound report (located under Ob Procedures) for additional details.   Thank you very much for allowing us to participate in the care of this very nice patient.  Should you have any questions, please do not hesitate to contact me.     Fermín Leal MD FACOG  Attending Physician, Maternal-Fetal Medicine  [facility]

## 2024-06-17 ENCOUNTER — HOSPITAL ENCOUNTER (OUTPATIENT)
Dept: INFUSION CENTER | Facility: CLINIC | Age: 24
Discharge: HOME | End: 2024-06-17
Payer: COMMERCIAL

## 2024-06-17 VITALS
TEMPERATURE: 97.8 F | SYSTOLIC BLOOD PRESSURE: 107 MMHG | RESPIRATION RATE: 18 BRPM | DIASTOLIC BLOOD PRESSURE: 68 MMHG | HEART RATE: 92 BPM

## 2024-06-17 DIAGNOSIS — O99.013 ANEMIA OF PREGNANCY IN THIRD TRIMESTER: ICD-10-CM

## 2024-06-17 DIAGNOSIS — Z3A.34 34 WEEKS GESTATION OF PREGNANCY: Primary | ICD-10-CM

## 2024-06-17 PROCEDURE — 96365 THER/PROPH/DIAG IV INF INIT: CPT

## 2024-06-17 PROCEDURE — 96366 THER/PROPH/DIAG IV INF ADDON: CPT

## 2024-06-17 RX ORDER — SODIUM CHLORIDE 9 MG/ML
20 INJECTION, SOLUTION INTRAVENOUS ONCE
Status: COMPLETED | OUTPATIENT
Start: 2024-06-17 | End: 2024-06-17

## 2024-06-17 RX ORDER — SODIUM CHLORIDE 9 MG/ML
20 INJECTION, SOLUTION INTRAVENOUS ONCE
Status: CANCELLED | OUTPATIENT
Start: 2024-06-21

## 2024-06-17 RX ADMIN — IRON SUCROSE 200 MG: 20 INJECTION, SOLUTION INTRAVENOUS at 13:52

## 2024-06-17 RX ADMIN — SODIUM CHLORIDE 20 ML/HR: 0.9 INJECTION, SOLUTION INTRAVENOUS at 13:48

## 2024-06-17 NOTE — PROGRESS NOTES
Ana Banuelos  tolerated treatment well with no complications.      Ana Banuelos is aware of future appt on Friday Jun 21, 2024 12:00 PM.    AVS declined by Ana Banuelos.

## 2024-06-21 ENCOUNTER — ROUTINE PRENATAL (OUTPATIENT)
Age: 24
End: 2024-06-21

## 2024-06-21 ENCOUNTER — HOSPITAL ENCOUNTER (OUTPATIENT)
Dept: INFUSION CENTER | Facility: CLINIC | Age: 24
End: 2024-06-21
Payer: COMMERCIAL

## 2024-06-21 VITALS
WEIGHT: 154.8 LBS | DIASTOLIC BLOOD PRESSURE: 78 MMHG | SYSTOLIC BLOOD PRESSURE: 124 MMHG | HEIGHT: 64 IN | HEART RATE: 99 BPM | OXYGEN SATURATION: 97 % | BODY MASS INDEX: 26.43 KG/M2

## 2024-06-21 VITALS
DIASTOLIC BLOOD PRESSURE: 63 MMHG | SYSTOLIC BLOOD PRESSURE: 114 MMHG | TEMPERATURE: 98.3 F | RESPIRATION RATE: 18 BRPM | HEART RATE: 81 BPM

## 2024-06-21 DIAGNOSIS — O26.899 RH NEGATIVE STATE IN ANTEPARTUM PERIOD: ICD-10-CM

## 2024-06-21 DIAGNOSIS — Z3A.35 35 WEEKS GESTATION OF PREGNANCY: Primary | ICD-10-CM

## 2024-06-21 DIAGNOSIS — D64.9 ANEMIA, UNSPECIFIED TYPE: ICD-10-CM

## 2024-06-21 DIAGNOSIS — Z34.83 ENCOUNTER FOR SUPERVISION OF OTHER NORMAL PREGNANCY, THIRD TRIMESTER: Primary | ICD-10-CM

## 2024-06-21 DIAGNOSIS — O99.013 ANEMIA OF PREGNANCY IN THIRD TRIMESTER: ICD-10-CM

## 2024-06-21 DIAGNOSIS — O09.899 RUBELLA NON-IMMUNE STATUS, ANTEPARTUM: ICD-10-CM

## 2024-06-21 DIAGNOSIS — Z28.39 RUBELLA NON-IMMUNE STATUS, ANTEPARTUM: ICD-10-CM

## 2024-06-21 DIAGNOSIS — Z67.91 RH NEGATIVE STATE IN ANTEPARTUM PERIOD: ICD-10-CM

## 2024-06-21 DIAGNOSIS — Z3A.35 35 WEEKS GESTATION OF PREGNANCY: ICD-10-CM

## 2024-06-21 DIAGNOSIS — Z82.0 FAMILY HISTORY OF CEREBRAL PALSY: ICD-10-CM

## 2024-06-21 PROCEDURE — PNV: Performed by: PHYSICIAN ASSISTANT

## 2024-06-21 PROCEDURE — 96365 THER/PROPH/DIAG IV INF INIT: CPT

## 2024-06-21 RX ORDER — SODIUM CHLORIDE 9 MG/ML
20 INJECTION, SOLUTION INTRAVENOUS ONCE
Status: COMPLETED | OUTPATIENT
Start: 2024-06-21 | End: 2024-06-21

## 2024-06-21 RX ORDER — SODIUM CHLORIDE 9 MG/ML
20 INJECTION, SOLUTION INTRAVENOUS ONCE
Status: CANCELLED | OUTPATIENT
Start: 2024-06-24

## 2024-06-21 RX ADMIN — IRON SUCROSE 200 MG: 20 INJECTION, SOLUTION INTRAVENOUS at 12:43

## 2024-06-21 RX ADMIN — SODIUM CHLORIDE 20 ML/HR: 0.9 INJECTION, SOLUTION INTRAVENOUS at 12:42

## 2024-06-21 NOTE — PROGRESS NOTES
Patient arrived to the unit and denied complications with previous infusions. Tolerated venofer infusion well without adverse affects. Aware of future appointment on 6/24/24.

## 2024-06-21 NOTE — PROGRESS NOTES
Patient here for prenatal visit.  She is 35w1d pregnant.  Patient with no concerns.    US 24: EFW 39%, repeat as clinically indicated.    Fetal movement: yes  Bleeding: no  Contractions: no  Edema: some ankle swelling at end of day.    Tdap : completed    Perineal massage recommendation reviewed.     Infant feeding intent: breast  Contraception plans: condoms      Current Outpatient Medications on File Prior to Visit   Medication Sig Dispense Refill    ferrous sulfate 324 MG TBEC  (Patient not taking: Reported on 2024)      Prenatal Vit-Fe Fumarate-FA (PRENATAL 1+1 PO) Take by mouth       No current facility-administered medications on file prior to visit.       Pregravid Weight/BMI: 50.8 kg (112 lb) (BMI 19.22)  Current Weight:     Total Weight Gain: 19.1 kg (42 lb 3.2 oz)          Vitals:    24 0841   BP: 124/78   Pulse: 99   SpO2: 97%     Fundal height: 35  Fetal Heart Rate: 146      Physical Exam  Constitutional:       General: She is not in acute distress.     Appearance: She is well-developed.   Abdominal:      Palpations: Abdomen is soft.      Tenderness: There is no abdominal tenderness. There is no guarding.   Neurological:      Mental Status: She is alert and oriented to person, place, and time.   Skin:     General: Skin is warm and dry.   Psychiatric:         Behavior: Behavior normal.     Problem List          Cardiovascular and Mediastinum    Fetal ventricular septal defect affecting antepartum care of mother    Overview     Fetal echo 24:  -There is a muscular VSD with otherwise normal cardiac anatomy.   -Normal prenatal care, delivery, and  care are recommended.   -Recommend  outpatient cardiology consult with echocardiogram at 1-2 weeks of life (Please schedule appointment prior to hospital discharge. Phone [phone]).            Nervous and Auditory    Family history of cerebral palsy    Overview     Genetic counseling declined after discussion at [facility] appt              Blood    Rh negative state in antepartum period    Overview     Will need rhogam at 28 wks- completed 5/2/24         Anemia    Overview     venofer infusions twice weekly         Anemia of pregnancy in third trimester       Obstetrics/Gynecology    Encounter for supervision of other normal pregnancy, third trimester    Overview     CF/ SMA testing: negative carrier  Flu vaccine: received 12/18/23  Covid vaccine:  completed X 2  NIPT low risk. msAFP neg  S/p tdap  Consents signed          35 weeks gestation of pregnancy    Rubella non-immune status, antepartum    Overview     Will need postpartum vaccine              OB visit in 1 wks  GBS at next visit.   FKC & PTL precautions reviewed.   Continue iron infusions  Reviewed perineal massage recommendation.

## 2024-06-21 NOTE — PLAN OF CARE
Problem: INFECTION - ADULT  Goal: Absence or prevention of progression during hospitalization  Description: INTERVENTIONS:  - Assess and monitor for signs and symptoms of infection  - Monitor lab/diagnostic results  - Monitor all insertion sites, i.e. indwelling lines, tubes, and drains  - Monitor endotracheal if appropriate and nasal secretions for changes in amount and color  - [place] appropriate cooling/warming therapies per order  - Administer medications as ordered  - Instruct and encourage patient and family to use good hand hygiene technique  - Identify and instruct in appropriate isolation precautions for identified infection/condition  Outcome: Progressing

## 2024-06-24 ENCOUNTER — HOSPITAL ENCOUNTER (OUTPATIENT)
Dept: INFUSION CENTER | Facility: CLINIC | Age: 24
Discharge: HOME | End: 2024-06-24
Payer: COMMERCIAL

## 2024-06-24 VITALS
DIASTOLIC BLOOD PRESSURE: 67 MMHG | HEART RATE: 89 BPM | TEMPERATURE: 97.8 F | RESPIRATION RATE: 18 BRPM | SYSTOLIC BLOOD PRESSURE: 111 MMHG

## 2024-06-24 DIAGNOSIS — Z3A.35 35 WEEKS GESTATION OF PREGNANCY: Primary | ICD-10-CM

## 2024-06-24 DIAGNOSIS — O99.013 ANEMIA OF PREGNANCY IN THIRD TRIMESTER: ICD-10-CM

## 2024-06-24 RX ORDER — SODIUM CHLORIDE 9 MG/ML
20 INJECTION, SOLUTION INTRAVENOUS ONCE
Status: CANCELLED | OUTPATIENT
Start: 2024-06-26

## 2024-06-24 RX ORDER — SODIUM CHLORIDE 9 MG/ML
20 INJECTION, SOLUTION INTRAVENOUS ONCE
Status: COMPLETED | OUTPATIENT
Start: 2024-06-24 | End: 2024-06-24

## 2024-06-24 RX ADMIN — IRON SUCROSE 200 MG: 20 INJECTION, SOLUTION INTRAVENOUS at 08:28

## 2024-06-24 RX ADMIN — SODIUM CHLORIDE 20 ML/HR: 0.9 INJECTION, SOLUTION INTRAVENOUS at 08:18

## 2024-06-26 ENCOUNTER — HOSPITAL ENCOUNTER (OUTPATIENT)
Dept: INFUSION CENTER | Facility: CLINIC | Age: 24
Discharge: HOME | End: 2024-06-26
Payer: COMMERCIAL

## 2024-06-26 VITALS
RESPIRATION RATE: 18 BRPM | TEMPERATURE: 97.5 F | HEART RATE: 92 BPM | SYSTOLIC BLOOD PRESSURE: 114 MMHG | DIASTOLIC BLOOD PRESSURE: 66 MMHG

## 2024-06-26 DIAGNOSIS — O99.013 ANEMIA OF PREGNANCY IN THIRD TRIMESTER: ICD-10-CM

## 2024-06-26 DIAGNOSIS — Z3A.35 35 WEEKS GESTATION OF PREGNANCY: Primary | ICD-10-CM

## 2024-06-26 PROCEDURE — 96365 THER/PROPH/DIAG IV INF INIT: CPT

## 2024-06-26 RX ORDER — SODIUM CHLORIDE 9 MG/ML
20 INJECTION, SOLUTION INTRAVENOUS ONCE
Status: COMPLETED | OUTPATIENT
Start: 2024-06-26 | End: 2024-06-26

## 2024-06-26 RX ORDER — SODIUM CHLORIDE 9 MG/ML
20 INJECTION, SOLUTION INTRAVENOUS ONCE
Status: CANCELLED | OUTPATIENT
Start: 2024-07-02

## 2024-06-26 RX ADMIN — IRON SUCROSE 200 MG: 20 INJECTION, SOLUTION INTRAVENOUS at 08:23

## 2024-06-26 RX ADMIN — SODIUM CHLORIDE 20 ML/HR: 0.9 INJECTION, SOLUTION INTRAVENOUS at 08:19

## 2024-07-01 ENCOUNTER — ROUTINE PRENATAL (OUTPATIENT)
Age: 24
End: 2024-07-01

## 2024-07-01 VITALS
DIASTOLIC BLOOD PRESSURE: 62 MMHG | SYSTOLIC BLOOD PRESSURE: 122 MMHG | HEIGHT: 64 IN | HEART RATE: 99 BPM | OXYGEN SATURATION: 98 % | BODY MASS INDEX: 26.57 KG/M2

## 2024-07-01 DIAGNOSIS — D64.9 ANEMIA, UNSPECIFIED TYPE: ICD-10-CM

## 2024-07-01 DIAGNOSIS — Z3A.36 36 WEEKS GESTATION OF PREGNANCY: ICD-10-CM

## 2024-07-01 DIAGNOSIS — Z28.39 RUBELLA NON-IMMUNE STATUS, ANTEPARTUM: ICD-10-CM

## 2024-07-01 DIAGNOSIS — Z34.83 ENCOUNTER FOR SUPERVISION OF OTHER NORMAL PREGNANCY, THIRD TRIMESTER: Primary | ICD-10-CM

## 2024-07-01 DIAGNOSIS — Z82.0 FAMILY HISTORY OF CEREBRAL PALSY: ICD-10-CM

## 2024-07-01 DIAGNOSIS — Z67.91 RH NEGATIVE STATE IN ANTEPARTUM PERIOD: ICD-10-CM

## 2024-07-01 DIAGNOSIS — O09.899 RUBELLA NON-IMMUNE STATUS, ANTEPARTUM: ICD-10-CM

## 2024-07-01 DIAGNOSIS — O26.899 RH NEGATIVE STATE IN ANTEPARTUM PERIOD: ICD-10-CM

## 2024-07-01 PROCEDURE — 87150 DNA/RNA AMPLIFIED PROBE: CPT | Performed by: PHYSICIAN ASSISTANT

## 2024-07-01 PROCEDURE — PNV: Performed by: PHYSICIAN ASSISTANT

## 2024-07-01 NOTE — PROGRESS NOTES
Patient here for prenatal visit.  She is 36w4d pregnant.  Patient doing well, no complaints.   Continues with iron infusions    US 24: EFW 39%, repeat as clinically indicated.       Fetal movement: yes  Bleeding: no  Contractions: occasional  Edema: some ankle swelling by end of day.    Tdap: completed      Contraceptive plans: condoms  Intent to feed baby: breast    Perineal massage recommendation reviewed       Current Outpatient Medications on File Prior to Visit   Medication Sig Dispense Refill    Prenatal Vit-Fe Fumarate-FA (PRENATAL 1+1 PO) Take by mouth       No current facility-administered medications on file prior to visit.       Pregravid Weight/BMI: 50.8 kg (112 lb) (BMI 19.22)  Current Weight:     Total Weight Gain: 19.4 kg (42 lb 12.8 oz)          Vitals:    24 0832   BP: 122/62   Pulse: 99   SpO2: 98%         Fundal height: 36  Fetal Heart Rate: 140      Physical Exam  Constitutional:       General: She is not in acute distress.     Appearance: She is well-developed.   Abdominal:      Palpations: Abdomen is soft.      Tenderness: There is no abdominal tenderness. There is no guarding.   Neurological:      Mental Status: She is alert and oriented to person, place, and time.   Skin:     General: Skin is warm and dry.   Psychiatric:         Behavior: Behavior normal.       Problem List          Cardiovascular and Mediastinum    Fetal ventricular septal defect affecting antepartum care of mother    Overview     Fetal echo 24:  -There is a muscular VSD with otherwise normal cardiac anatomy.   -Normal prenatal care, delivery, and  care are recommended.   -Recommend  outpatient cardiology consult with echocardiogram at 1-2 weeks of life (Please schedule appointment prior to hospital discharge. Phone [phone]).            Nervous and Auditory    Family history of cerebral palsy    Overview     Genetic counseling declined after discussion at [facility] appt             Blood    Rh  negative state in antepartum period    Overview     Will need rhogam at 28 wks- completed 5/2/24         Anemia    Overview     Iron supplements started at 26 weeks.  Recheck CBC in 4 wks         Anemia of pregnancy in third trimester       Obstetrics/Gynecology    Encounter for supervision of other normal pregnancy, third trimester    Overview     CF/ SMA testing: negative carrier  Flu vaccine: received 12/18/23  Covid vaccine:  completed X 2  NIPT low risk. msAFP neg  S/p tdap  Consents signed          36 weeks gestation of pregnancy    Rubella non-immune status, antepartum    Overview     Will need postpartum vaccine               Ob visit in 1 weeks  GBS collected today.   Continue Fetal kick counts   Reviewed warning signs and when to call.

## 2024-07-02 ENCOUNTER — HOSPITAL ENCOUNTER (OUTPATIENT)
Dept: INFUSION CENTER | Facility: CLINIC | Age: 24
Discharge: HOME | End: 2024-07-02
Payer: COMMERCIAL

## 2024-07-02 VITALS
RESPIRATION RATE: 18 BRPM | DIASTOLIC BLOOD PRESSURE: 65 MMHG | HEART RATE: 102 BPM | SYSTOLIC BLOOD PRESSURE: 109 MMHG | TEMPERATURE: 98.6 F

## 2024-07-02 DIAGNOSIS — O99.013 ANEMIA OF PREGNANCY IN THIRD TRIMESTER: ICD-10-CM

## 2024-07-02 DIAGNOSIS — Z3A.36 36 WEEKS GESTATION OF PREGNANCY: Primary | ICD-10-CM

## 2024-07-02 PROCEDURE — 96365 THER/PROPH/DIAG IV INF INIT: CPT

## 2024-07-02 RX ORDER — SODIUM CHLORIDE 9 MG/ML
20 INJECTION, SOLUTION INTRAVENOUS ONCE
Status: COMPLETED | OUTPATIENT
Start: 2024-07-02 | End: 2024-07-02

## 2024-07-02 RX ORDER — SODIUM CHLORIDE 9 MG/ML
20 INJECTION, SOLUTION INTRAVENOUS ONCE
Status: CANCELLED | OUTPATIENT
Start: 2024-07-02

## 2024-07-02 RX ADMIN — SODIUM CHLORIDE 20 ML/HR: 0.9 INJECTION, SOLUTION INTRAVENOUS at 14:57

## 2024-07-02 RX ADMIN — IRON SUCROSE 200 MG: 20 INJECTION, SOLUTION INTRAVENOUS at 15:04

## 2024-07-02 NOTE — PROGRESS NOTES
Ana Banuelos  tolerated treatment well with no complications.      Ana Banuelos is aware she has no future appointments.    AVS declined by Ana Banuelos.

## 2024-07-03 LAB — GP B STREP DNA SPEC QL NAA+PROBE: NEGATIVE

## 2024-07-08 ENCOUNTER — ROUTINE PRENATAL (OUTPATIENT)
Age: 24
End: 2024-07-08

## 2024-07-08 VITALS
BODY MASS INDEX: 27.59 KG/M2 | OXYGEN SATURATION: 96 % | WEIGHT: 161.6 LBS | HEART RATE: 100 BPM | SYSTOLIC BLOOD PRESSURE: 118 MMHG | HEIGHT: 64 IN | DIASTOLIC BLOOD PRESSURE: 58 MMHG

## 2024-07-08 DIAGNOSIS — Z3A.37 37 WEEKS GESTATION OF PREGNANCY: ICD-10-CM

## 2024-07-08 DIAGNOSIS — Z67.91 RH NEGATIVE STATE IN ANTEPARTUM PERIOD: ICD-10-CM

## 2024-07-08 DIAGNOSIS — O09.899 RUBELLA NON-IMMUNE STATUS, ANTEPARTUM: ICD-10-CM

## 2024-07-08 DIAGNOSIS — Z28.39 RUBELLA NON-IMMUNE STATUS, ANTEPARTUM: ICD-10-CM

## 2024-07-08 DIAGNOSIS — Z82.0 FAMILY HISTORY OF CEREBRAL PALSY: ICD-10-CM

## 2024-07-08 DIAGNOSIS — Z34.83 ENCOUNTER FOR SUPERVISION OF OTHER NORMAL PREGNANCY, THIRD TRIMESTER: Primary | ICD-10-CM

## 2024-07-08 DIAGNOSIS — O26.899 RH NEGATIVE STATE IN ANTEPARTUM PERIOD: ICD-10-CM

## 2024-07-08 DIAGNOSIS — D64.9 ANEMIA, UNSPECIFIED TYPE: ICD-10-CM

## 2024-07-08 PROCEDURE — PNV: Performed by: PHYSICIAN ASSISTANT

## 2024-07-08 NOTE — PROGRESS NOTES
Patient here for prenatal visit.  She is 37w4d pregnant.     Discussed induction timing.  Patient wanting to wait for natural labor.      Fetal movement: yes  Bleeding: no  Contractions: occasional  Edema: some ankle swelling only    Tdap: completed    Perineal massage recommendation reviewed     GBS: negative      Current Outpatient Medications on File Prior to Visit   Medication Sig Dispense Refill    Prenatal Vit-Fe Fumarate-FA (PRENATAL 1+1 PO) Take by mouth       No current facility-administered medications on file prior to visit.       Pregravid Weight/BMI: 50.8 kg (112 lb) (BMI 19.22)  Current Weight:     Total Weight Gain: 22.5 kg (49 lb 9.6 oz)          Vitals:    24 1015   BP: 118/58   Pulse: 100   SpO2: 96%     Fundal height: 37  Fetal Heart Rate: 146      Problem List          Cardiovascular and Mediastinum    Fetal ventricular septal defect affecting antepartum care of mother    Overview     Fetal echo 24:  -There is a muscular VSD with otherwise normal cardiac anatomy.   -Normal prenatal care, delivery, and  care are recommended.   -Recommend  outpatient cardiology consult with echocardiogram at 1-2 weeks of life (Please schedule appointment prior to hospital discharge. Phone [phone]).            Nervous and Auditory    Family history of cerebral palsy    Overview     Genetic counseling declined after discussion at [facility] appt             Blood    Rh negative state in antepartum period    Overview     Will need rhogam at 28 wks- completed 24         Anemia    Overview     Iron supplements started at 26 weeks.  Recheck CBC in 4 wks         Anemia of pregnancy in third trimester       Obstetrics/Gynecology    Encounter for supervision of other normal pregnancy, third trimester    Overview     CF/ SMA testing: negative carrier  Flu vaccine: received 23  Covid vaccine:  completed X 2  NIPT low risk. msAFP neg  S/p tdap  Consents signed          37 weeks gestation of  pregnancy    Rubella non-immune status, antepartum    Overview     Will need postpartum vaccine          Reviewed signs and symptoms of labor.  Advised to call with bleeding, leakage of fluids, decreased movements, contractions every 5 minutes.   F/u in 1 week.

## 2024-07-11 ENCOUNTER — NURSE TRIAGE (OUTPATIENT)
Age: 24
End: 2024-07-11

## 2024-07-11 ENCOUNTER — HOSPITAL ENCOUNTER (OUTPATIENT)
Facility: HOSPITAL | Age: 24
Discharge: HOME | End: 2024-07-11
Attending: OBSTETRICS & GYNECOLOGY | Admitting: OBSTETRICS & GYNECOLOGY
Payer: COMMERCIAL

## 2024-07-11 VITALS
HEART RATE: 87 BPM | DIASTOLIC BLOOD PRESSURE: 59 MMHG | TEMPERATURE: 97.9 F | RESPIRATION RATE: 16 BRPM | SYSTOLIC BLOOD PRESSURE: 123 MMHG

## 2024-07-11 PROBLEM — O36.8190 DECREASED FETAL MOVEMENT: Status: ACTIVE | Noted: 2024-07-11

## 2024-07-11 PROCEDURE — NC001 PR NO CHARGE: Performed by: OBSTETRICS & GYNECOLOGY

## 2024-07-11 PROCEDURE — 99202 OFFICE O/P NEW SF 15 MIN: CPT

## 2024-07-11 PROCEDURE — 76815 OB US LIMITED FETUS(S): CPT

## 2024-07-11 NOTE — PROGRESS NOTES
L&D Triage Note - OB/GYN  Ana Banuelos 23 y.o. female MRN: [id number]  Unit/Bed#: LD TRIAGE  Encounter: [id number]      ASSESSMENT:    Ana Banuelos is a 23 y.o.  at 38w0d who was evaluated today for decreased fetal movement. NST is reactive and LOS within normal limits with fetal movement seen on ultrasound. Patient feeling fetal movement in triage. Safe for discharge to home with return precautions reviewed.    PLAN:    1) Decreased Fetal Movement  - LOS: 12.1 cm  - NST reactive   - Patient feeling movement in triage      2) 38 weeks gestation of pregnancy  - Continue routine prenatal care  - Discharge from OB triage with term labor precautions   - Reviewed rupture of membranes, false vs true labor, decreased fetal movement, and vaginal bleeding  - Pt to call provider with any concerns and follow up at her next scheduled prenatal appointment 24 with Dr. Resendiz   - Case discussed with Dr. Last    SUBJECTIVE:    Ana Banuelos 23 y.o.  at 38w0d with an Estimated Date of Delivery: 24 presenting with decreased fetal movement. She last felt baby move normally this morning, but starting around noon today she said baby has just been less active. She reports no trauma to the abdomen and no recent illnesses. She tried drinking cold water and eating sweets to get baby to move more. She is feeling baby move in triage, but movement is still decreased (2-3 kicks per hour). She reports no vaginal bleeding, no loss of fluid, and no contractions.     Her current pregnancy has been complicated by fetal VSD, Rh- s/p Rhogam, YANIRA s/p iron infusions     Contractions: Denies  Leakage of fluid: Denies  Vaginal Bleeding: Denies  Fetal movement: present, but decreased     OBJECTIVE:    Vitals:    24 1837   BP: 123/59   Pulse: 87   Resp: 16   Temp: 97.9 °F (36.6 °C)       ROS:  Constitutional: denies fevers or headache  Respiratory: denies SOB or URI symptoms   Cardiovascular: denies chest  pain  Gastrointestinal: denies n/v/d    General Physical Exam:  General: Well appearing, no distress  Respiratory: Unlabored breathing  Cardiovascular: Regular rate.  Abdomen: Soft, gravid, nontender  Fundal Height: Appropriate for gestational age.  Extremities: Warm and well perfused.  Non tender.      Fetal monitoring:  Fetal heart rate: Baseline Rate (FHR): 135 bpm  Variability: Moderate  Accelerations: 15 x 15 or greater  Decelerations: None  [place]: Contraction Frequency (minutes): 5-10  Contraction Duration (seconds): 60-90  Contraction Intensity: Mild    Imaging:        Abd. US   LOS      - Q1 2.63 cm     - Q2 3.23 cm     - Q3 3.07 cm     - Q4 3.17 cm     - Total: 12.1 cm   Presentation: cephalic    Celia Camburn MS4    Marcia Cheung  PGY 2, Obstetrics and Gynecology  7/11/2024  8:52 PM

## 2024-07-11 NOTE — TELEPHONE ENCOUNTER
"Patient is 38w  reporting decreased fetal movement today.  She states she has done a kick count and she felt 2-3 movements in the last hour.  She denies any vaginal bleeding, LOF.  Advised patient to report to L&D at the [place] location to be further evaluated.  Patient verbalized understanding and voiced appreciation for phone call.       [facility] message sent to Dr. Last, on call provider, and the L&D charge nurse at [place] to make aware of patient's arrival.     Reason for Disposition   Pregnant 23 or more weeks and baby moving less today by kick count (e.g., kick count < 5 in 1 hour or < 10 in 2 hours)    Answer Assessment - Initial Assessment Questions  1. FETAL MOVEMENT: \"Has the baby's movement decreased or changed significantly from normal?\" (e.g., yes, no; describe)      Decreased   2. BETHANY: \"What date are you expecting to deliver?\"       24  3. PREGNANCY: \"How many weeks pregnant are you?\"       38w   4. OTHER SYMPTOMS: \"Do you have any other symptoms?\" (e.g., abdominal pain, leaking fluid from vagina, vaginal bleeding, etc.)      Denies    Protocols used: Pregnancy - Decreased Fetal Movement-ADULT-OH    "

## 2024-07-12 ENCOUNTER — APPOINTMENT (OUTPATIENT)
Age: 24
End: 2024-07-12
Payer: COMMERCIAL

## 2024-07-12 DIAGNOSIS — D64.9 ANEMIA, UNSPECIFIED TYPE: ICD-10-CM

## 2024-07-12 LAB
BASOPHILS # BLD AUTO: 0.02 THOUSANDS/ÂΜL (ref 0–0.1)
BASOPHILS NFR BLD AUTO: 0 % (ref 0–1)
EOSINOPHIL # BLD AUTO: 0.06 THOUSAND/ÂΜL (ref 0–0.61)
EOSINOPHIL NFR BLD AUTO: 1 % (ref 0–6)
FERRITIN SERPL-MCNC: 113 NG/ML (ref 11–307)
HCT VFR BLD AUTO: 39.9 % (ref 34.8–46.1)
HGB BLD-MCNC: 12.5 G/DL (ref 11.5–15.4)
IMM GRANULOCYTES # BLD AUTO: 0.07 THOUSAND/UL (ref 0–0.2)
IMM GRANULOCYTES NFR BLD AUTO: 1 % (ref 0–2)
LYMPHOCYTES # BLD AUTO: 0.87 THOUSANDS/ÂΜL (ref 0.6–4.47)
LYMPHOCYTES NFR BLD AUTO: 10 % (ref 14–44)
MCH RBC QN AUTO: 28.3 PG (ref 26.8–34.3)
MCHC RBC AUTO-ENTMCNC: 31.3 G/DL (ref 31.4–37.4)
MCV RBC AUTO: 90 FL (ref 82–98)
MONOCYTES # BLD AUTO: 0.52 THOUSAND/ÂΜL (ref 0.17–1.22)
MONOCYTES NFR BLD AUTO: 6 % (ref 4–12)
NEUTROPHILS # BLD AUTO: 6.98 THOUSANDS/ÂΜL (ref 1.85–7.62)
NEUTS SEG NFR BLD AUTO: 82 % (ref 43–75)
NRBC BLD AUTO-RTO: 0 /100 WBCS
PLATELET # BLD AUTO: 181 THOUSANDS/UL (ref 149–390)
PMV BLD AUTO: 10.6 FL (ref 8.9–12.7)
RBC # BLD AUTO: 4.42 MILLION/UL (ref 3.81–5.12)
WBC # BLD AUTO: 8.52 THOUSAND/UL (ref 4.31–10.16)

## 2024-07-12 PROCEDURE — 36415 COLL VENOUS BLD VENIPUNCTURE: CPT

## 2024-07-12 PROCEDURE — 82728 ASSAY OF FERRITIN: CPT

## 2024-07-12 PROCEDURE — 85025 COMPLETE CBC W/AUTO DIFF WBC: CPT

## 2024-07-12 NOTE — DISCHARGE INSTRUCTIONS
Pregnancy at 35 to 38 Weeks   WHAT YOU NEED TO KNOW:   What changes are happening with my body?  You are considered full term at the beginning of 37 weeks. Your breathing may be easier if your baby has moved down into a head-down position. You may need to urinate more often because the baby may be pressing on your bladder. You may also feel more discomfort and get tired easily.  How do I care for myself at this stage of my pregnancy?       Eat a variety of healthy foods.  Healthy foods include fruits, vegetables, whole-grain breads, low-fat dairy foods, beans, lean meats, and fish. Drink liquids as directed. Ask how much liquid to drink each day and which liquids are best for you. Limit caffeine to less than 200 milligrams each day. Limit your intake of fish to 2 servings each week. Choose fish low in mercury such as canned light tuna, shrimp, salmon, cod, or tilapia. Do not  eat fish high in mercury such as swordfish, tilefish, valeria mackerel, and shark.         Take prenatal vitamins as directed.  Your need for certain vitamins and minerals, such as folic acid, increases during pregnancy. Prenatal vitamins provide some of the extra vitamins and minerals you need. Prenatal vitamins may also help to decrease the risk of certain birth defects.         Rest as needed.  Put your feet up if you have swelling in your ankles and feet.         Talk to your healthcare provider about exercise.  Moderate exercise can help you stay fit. Your healthcare provider will help you plan an exercise program that is safe for you during pregnancy.         Do not smoke.  Smoking increases your risk of a miscarriage and other health problems during your pregnancy. Smoking can cause your baby to be born early or weigh less at birth. Ask your healthcare provider for information if you need help quitting.    Do not drink alcohol.  Alcohol passes from your body to your baby through the placenta. It can affect your baby's brain development and  cause fetal alcohol syndrome (FAS). FAS is a group of conditions that causes mental, behavior, and growth problems.    Talk to your healthcare provider before you take any medicines.  Many medicines may harm your baby if you take them when you are pregnant. Do not take any medicines, vitamins, herbs, or supplements without first talking to your healthcare provider. Never use illegal or street drugs (such as marijuana or cocaine) while you are pregnant.    What are some safety tips during pregnancy?   Avoid hot tubs and saunas.  Do not use a hot tub or sauna while you are pregnant, especially during your first trimester. Hot tubs and saunas may raise your baby's temperature and increase the risk of birth defects.    Avoid toxoplasmosis.  This is an infection caused by eating raw meat or being around infected cat feces. It can cause birth defects, miscarriages, and other problems. Wash your hands after you touch raw meat. Make sure any meat is well-cooked before you eat it. Avoid raw eggs and unpasteurized milk. Use gloves or ask someone else to clean your cat's litter box while you are pregnant.         Ask your healthcare provider about travel.  The most comfortable time to travel is during the second trimester. Ask your provider if you can travel after 36 weeks. You may not be able to travel in an airplane after 36 weeks. He or she may also recommend you avoid long road trips.    What changes are happening with my baby?  By 38 weeks, your baby may weigh between 6 and 9 pounds. Your baby may be about 14 inches long from the top of the head to the rump (baby's bottom). Your baby hears well enough to know your voice. As your baby gets larger, you may feel fewer kicks and more stretching and rolling. Your baby may move into a head-down position. Your baby will also rest lower in your abdomen.  What do I need to know about prenatal care?  Your healthcare provider will check your blood pressure and weight. You may also  need the following:  A urine test  may also be done to check for sugar and protein. These can be signs of gestational diabetes or infection. Protein in your urine may also be a sign of preeclampsia. Preeclampsia is a condition that can develop during week 20 or later of your pregnancy. It causes high blood pressure, and it can cause problems with your kidneys and other organs.    A gestational diabetes screen  may be done. Your healthcare provider may order either a 1-step or 2-step oral glucose tolerance test (OGTT).     1-step OGTT:  Your blood sugar level will be tested after you have not eaten for 8 hours (fasting). You will then be given a glucose drink. Your level will be tested again 1 hour and 2 hours after you finish the drink.    2-step OGTT:  You do not have to fast for the first part of the test. You will have the glucose drink at any time of day. Your blood sugar level will be checked 1 hour later. If your blood sugar is higher than a certain level, another test will be ordered. You will fast and your blood sugar level will be tested. You will have the glucose drink. Your blood will be tested again 1 hour, 2 hours, and 3 hours after you finish the glucose drink.    A blood test  may be done to check for anemia (low iron level).    A Tdap vaccine  may be recommended by your healthcare provider.    A group B strep test  is a test that is done to check for group B strep infection. Group B strep is a type of bacteria that may be found in the vagina or rectum. It can be passed to your baby during delivery if you have it. Your healthcare provider will take swab your vagina or rectum and send the sample to the lab for tests.    Fundal height  is a measurement of your uterus to check your baby's growth. This number is usually the same as the number of weeks that you have been pregnant. Your healthcare provider may also check your baby's position.    Your baby's heart rate  will be checked.    When should I seek  immediate care?   You develop a severe headache that does not go away.    You have new or increased vision changes, such as blurred or spotted vision.    You have new or increased swelling in your face or hands.    You have vaginal spotting or bleeding.    Your water broke or you feel warm water gushing or trickling from your vagina.    When should I call my obstetrician?   You have more than 5 contractions in 1 hour.    You notice any changes in your baby's movements.    You have abdominal cramps, pressure, or tightening.    You have a change in vaginal discharge.    You have chills or a fever.    You have vaginal itching, burning, or pain.    You have yellow, green, white, or foul-smelling vaginal discharge.    You have pain or burning when you urinate, less urine than usual, or pink or bloody urine.    You have questions or concerns about your condition or care.    CARE AGREEMENT:   You have the right to help plan your care. Learn about your health condition and how it may be treated. Discuss treatment options with your healthcare providers to decide what care you want to receive. You always have the right to refuse treatment. The above information is an  only. It is not intended as medical advice for individual conditions or treatments. Talk to your doctor, nurse or pharmacist before following any medical regimen to see if it is safe and effective for you.  © Copyright [employer] 2022 Information is for End User's use only and may not be sold, redistributed or otherwise used for commercial purposes. All illustrations and images included in CareNotes® are the copyrighted property of [employer] or [employer]

## 2024-07-12 NOTE — PROCEDURES
Ana Banuelos, a  at 38w1d with an BETHANY of 2024, by Last Menstrual Period, was seen at [facility] AND DELIVERY for the following procedure(s): $Procedure Type: LOS]         4 Quadrant LOS  LOS Q1 (cm): 2.6 cm  LOS Q2 (cm): 3.2 cm  LOS Q3 (cm): 3.1 cm  LOS Q4 (cm): 3.2 cm  LOS TOTAL (cm): 12.1 cm  LVP (cm): 3.2 cm                         Marcia Cheung MD  PGY 2, Obstetrics and Gynecology  2024  2:44 AM

## 2024-07-15 PROBLEM — Z3A.38 38 WEEKS GESTATION OF PREGNANCY: Status: ACTIVE | Noted: 2023-12-18

## 2024-07-15 NOTE — PROGRESS NOTES
"OB/GYN  PN Visit  Ana Banuelos  [id number]  2024  11:36 AM  Dr. Joselyn Resendiz MD    S: 23 y.o.  38w5d here for PN visit.       Chief Complaint   Patient presents with    Routine Prenatal Visit         OB complaints:  Contractions: no  Leakage: no  Bleeding: no  Fetal movement: yes      O:  /66   Pulse 99   Ht 5' 4\" (1.626 m)   Wt 73.3 kg (161 lb 9.6 oz)   LMP 10/19/2023 (Exact Date)   SpO2 97%   BMI 27.74 kg/m²       Review of Systems   Constitutional: Negative.    HENT: Negative.     Eyes: Negative.    Respiratory: Negative.     Cardiovascular: Negative.    Gastrointestinal: Negative.    Endocrine: Negative.    Genitourinary:         As noted in HPI   Musculoskeletal: Negative.    Skin: Negative.    Allergic/Immunologic: Negative.    Neurological: Negative.    Hematological: Negative.    Psychiatric/Behavioral: Negative.           Physical Exam  Constitutional:       General: She is not in acute distress.     Appearance: Normal appearance. She is well-developed.   Abdominal:      Palpations: Abdomen is soft.      Tenderness: There is no abdominal tenderness. There is no guarding.   Neurological:      Mental Status: She is alert and oriented to person, place, and time.   Skin:     General: Skin is warm and dry.   Psychiatric:         Behavior: Behavior normal.           Pregravid Weight/BMI: 50.8 kg (112 lb) (BMI 19.22)  Current Weight: 73.3 kg (161 lb 9.6 oz)   Total Weight Gain: 22.5 kg (49 lb 9.6 oz)   Pre-Cristin Vitals      Flowsheet Row Most Recent Value   Prenatal Assessment    Fetal Heart Rate 146   Fundal Height (cm) 37 cm   Movement Present   Presentation Vertex   Prenatal Vitals    Blood Pressure 100/66   Weight - Scale 73.3 kg (161 lb 9.6 oz)   Urine Albumin/Glucose    Dilation/Effacement/Station    Cervical Dilation 1   Cervical Effacement 50   Fetal Station -3   Vaginal Drainage    Edema              Problem List          Cardiovascular and Mediastinum    Fetal ventricular " septal defect affecting antepartum care of mother    Overview     Fetal echo 24:  -There is a muscular VSD with otherwise normal cardiac anatomy.   -Normal prenatal care, delivery, and  care are recommended.   -Recommend  outpatient cardiology consult with echocardiogram at 1-2 weeks of life (Please schedule appointment prior to hospital discharge. Phone [phone]).            Nervous and Auditory    Family history of cerebral palsy    Overview     Genetic counseling declined after discussion at [facility] appt             Blood    Rh negative state in antepartum period    Overview     Will need rhogam at 28 wks- completed 24         Anemia    Overview     Iron supplements started at 26 weeks.  Recheck CBC in 4 wks         Anemia of pregnancy in third trimester       Other Pediatrics    Decreased fetal movement       Obstetrics/Gynecology    Encounter for supervision of other normal pregnancy, third trimester    Overview     CF/ SMA testing: negative carrier  Flu vaccine: received 23  Covid vaccine:  completed X 2  NIPT low risk. msAFP neg  S/p tdap  Consents signed          38 weeks gestation of pregnancy    Rubella non-immune status, antepartum    Overview     Will need postpartum vaccine          Other Visit Diagnoses       Third trimester pregnancy    -  Primary           She reports some vaginal swelling.  Wet mount shows clue cells, KOH negative for yeast.  Bacterial vaginosis was diagnosed and treated with metronidazole twice a day for 1 week.    She was offered elective induction of labor which she accepts at her due date 2024, which she will schedule accordingly.  Discussed elective induction after 39 weeks is feasible, she will call if she would like to be induced sooner.  Labor precautions.    Follow-up in 1 week.    No future appointments.              Joselyn Resendiz MD  2024  11:36 AM

## 2024-07-16 ENCOUNTER — ROUTINE PRENATAL (OUTPATIENT)
Dept: OBGYN CLINIC | Facility: CLINIC | Age: 24
End: 2024-07-16

## 2024-07-16 VITALS
HEIGHT: 64 IN | WEIGHT: 161.6 LBS | OXYGEN SATURATION: 97 % | SYSTOLIC BLOOD PRESSURE: 100 MMHG | BODY MASS INDEX: 27.59 KG/M2 | HEART RATE: 99 BPM | DIASTOLIC BLOOD PRESSURE: 66 MMHG

## 2024-07-16 DIAGNOSIS — Z82.0 FAMILY HISTORY OF CEREBRAL PALSY: ICD-10-CM

## 2024-07-16 DIAGNOSIS — B96.89 BV (BACTERIAL VAGINOSIS): ICD-10-CM

## 2024-07-16 DIAGNOSIS — Z67.91 RH NEGATIVE STATE IN ANTEPARTUM PERIOD: ICD-10-CM

## 2024-07-16 DIAGNOSIS — N76.0 BV (BACTERIAL VAGINOSIS): ICD-10-CM

## 2024-07-16 DIAGNOSIS — O09.899 RUBELLA NON-IMMUNE STATUS, ANTEPARTUM: ICD-10-CM

## 2024-07-16 DIAGNOSIS — Z34.83 ENCOUNTER FOR SUPERVISION OF OTHER NORMAL PREGNANCY, THIRD TRIMESTER: ICD-10-CM

## 2024-07-16 DIAGNOSIS — Z3A.38 38 WEEKS GESTATION OF PREGNANCY: ICD-10-CM

## 2024-07-16 DIAGNOSIS — D64.9 ANEMIA, UNSPECIFIED TYPE: ICD-10-CM

## 2024-07-16 DIAGNOSIS — Z28.39 RUBELLA NON-IMMUNE STATUS, ANTEPARTUM: ICD-10-CM

## 2024-07-16 DIAGNOSIS — Z34.93 THIRD TRIMESTER PREGNANCY: Primary | ICD-10-CM

## 2024-07-16 DIAGNOSIS — O26.899 RH NEGATIVE STATE IN ANTEPARTUM PERIOD: ICD-10-CM

## 2024-07-16 PROCEDURE — PNV: Performed by: OBSTETRICS & GYNECOLOGY

## 2024-07-16 RX ORDER — METRONIDAZOLE 500 MG/1
500 TABLET ORAL EVERY 12 HOURS SCHEDULED
Qty: 14 TABLET | Refills: 0 | Status: SHIPPED | OUTPATIENT
Start: 2024-07-16 | End: 2024-07-23

## 2024-07-18 ENCOUNTER — TELEPHONE (OUTPATIENT)
Dept: OBGYN CLINIC | Facility: CLINIC | Age: 24
End: 2024-07-18

## 2024-07-18 NOTE — TELEPHONE ENCOUNTER
Called to schedule IOL for patient 7/25/24 is full and is offered July 23 she declines.  She has an appointment on July 23 and discussed other potential date for induction of labor would be July 27.

## 2024-07-22 ENCOUNTER — TELEPHONE (OUTPATIENT)
Age: 24
End: 2024-07-22

## 2024-07-22 NOTE — TELEPHONE ENCOUNTER
Patient called in stating that she is 39w4d pregnant and patient Is stating that she is calling about an induction date. Pt was notified that as per the documentation in the chart, induction dates will be discussed with her tomorrow at her appt, pt verbalized understanding, no further questions at this time.

## 2024-07-23 ENCOUNTER — ROUTINE PRENATAL (OUTPATIENT)
Dept: OBGYN CLINIC | Facility: CLINIC | Age: 24
End: 2024-07-23

## 2024-07-23 ENCOUNTER — TELEPHONE (OUTPATIENT)
Dept: OBGYN CLINIC | Facility: CLINIC | Age: 24
End: 2024-07-23

## 2024-07-23 VITALS
WEIGHT: 156 LBS | HEART RATE: 96 BPM | SYSTOLIC BLOOD PRESSURE: 118 MMHG | BODY MASS INDEX: 26.63 KG/M2 | DIASTOLIC BLOOD PRESSURE: 60 MMHG | HEIGHT: 64 IN | OXYGEN SATURATION: 98 %

## 2024-07-23 DIAGNOSIS — Z3A.39 39 WEEKS GESTATION OF PREGNANCY: ICD-10-CM

## 2024-07-23 DIAGNOSIS — O09.899 RUBELLA NON-IMMUNE STATUS, ANTEPARTUM: ICD-10-CM

## 2024-07-23 DIAGNOSIS — Z28.39 RUBELLA NON-IMMUNE STATUS, ANTEPARTUM: ICD-10-CM

## 2024-07-23 DIAGNOSIS — Z82.0 FAMILY HISTORY OF CEREBRAL PALSY: ICD-10-CM

## 2024-07-23 DIAGNOSIS — Z67.91 RH NEGATIVE STATE IN ANTEPARTUM PERIOD: ICD-10-CM

## 2024-07-23 DIAGNOSIS — O26.899 RH NEGATIVE STATE IN ANTEPARTUM PERIOD: ICD-10-CM

## 2024-07-23 DIAGNOSIS — Z34.83 ENCOUNTER FOR SUPERVISION OF OTHER NORMAL PREGNANCY, THIRD TRIMESTER: Primary | ICD-10-CM

## 2024-07-23 PROCEDURE — PNV: Performed by: PHYSICIAN ASSISTANT

## 2024-07-23 NOTE — PROGRESS NOTES
Patient here for prenatal visit.  She is 39w5d pregnant.     Fetal movement: yes  Bleeding: no  Contractions: no  Edema: some ankle swelling    Tdap: completed    GBS: negative    Current Outpatient Medications on File Prior to Visit   Medication Sig Dispense Refill    metroNIDAZOLE (FLAGYL) 500 mg tablet Take 1 tablet (500 mg total) by mouth every 12 (twelve) hours for 7 days 14 tablet 0    Prenatal Vit-Fe Fumarate-FA (PRENATAL 1+1 PO) Take by mouth       No current facility-administered medications on file prior to visit.       Pregravid Weight/BMI: 50.8 kg (112 lb) (BMI 19.22)  Current Weight:     Total Weight Gain: 20 kg (44 lb)          Vitals:    24 0852   BP: 118/60   Pulse: 96   SpO2: 98%     Fundal height: 39  Fetal Heart Rate: 144      Problem List          Cardiovascular and Mediastinum    Fetal ventricular septal defect affecting antepartum care of mother    Overview     Fetal echo 24:  -There is a muscular VSD with otherwise normal cardiac anatomy.   -Normal prenatal care, delivery, and  care are recommended.   -Recommend  outpatient cardiology consult with echocardiogram at 1-2 weeks of life (Please schedule appointment prior to hospital discharge. Phone [phone]).            Nervous and Auditory    Family history of cerebral palsy    Overview     Genetic counseling declined after discussion at [facility] appt             Blood    Rh negative state in antepartum period    Overview     Will need rhogam at 28 wks- completed 24         Anemia    Overview     Iron supplements started at 26 weeks.          Anemia of pregnancy in third trimester       Other Pediatrics    Decreased fetal movement       Obstetrics/Gynecology    Encounter for supervision of other normal pregnancy, third trimester    Overview     CF/ SMA testing: negative carrier  Flu vaccine: received 23  Covid vaccine:  completed X 2  NIPT low risk. msAFP neg  S/p tdap  Consents signed          39 weeks  gestation of pregnancy    Rubella non-immune status, antepartum    Overview     Will need postpartum vaccine            Reviewed signs and symptoms of labor.  Advised to call with bleeding, leakage of fluids, decreased movements, contractions every 5 minutes.   Will set up induction and message patient with date/time.  Induction consent reviewed and signed.

## 2024-07-27 ENCOUNTER — HOSPITAL ENCOUNTER (INPATIENT)
Facility: HOSPITAL | Age: 24
LOS: 3 days | Discharge: HOME | End: 2024-07-30
Attending: STUDENT IN AN ORGANIZED HEALTH CARE EDUCATION/TRAINING PROGRAM | Admitting: STUDENT IN AN ORGANIZED HEALTH CARE EDUCATION/TRAINING PROGRAM
Payer: COMMERCIAL

## 2024-07-27 ENCOUNTER — ANESTHESIA EVENT (INPATIENT)
Dept: LABOR AND DELIVERY | Facility: HOSPITAL | Age: 24
End: 2024-07-27
Payer: COMMERCIAL

## 2024-07-27 ENCOUNTER — ANESTHESIA (INPATIENT)
Dept: LABOR AND DELIVERY | Facility: HOSPITAL | Age: 24
End: 2024-07-27
Payer: COMMERCIAL

## 2024-07-27 ENCOUNTER — HOSPITAL ENCOUNTER (OUTPATIENT)
Dept: LABOR AND DELIVERY | Facility: HOSPITAL | Age: 24
Discharge: HOME | End: 2024-07-27
Payer: COMMERCIAL

## 2024-07-27 DIAGNOSIS — Z3A.40 40 WEEKS GESTATION OF PREGNANCY: Primary | ICD-10-CM

## 2024-07-27 DIAGNOSIS — Z98.891 STATUS POST PRIMARY LOW TRANSVERSE CESAREAN SECTION: Chronic | ICD-10-CM

## 2024-07-27 PROBLEM — Z34.90 ENCOUNTER FOR ELECTIVE INDUCTION OF LABOR: Chronic | Status: ACTIVE | Noted: 2024-07-27

## 2024-07-27 PROBLEM — Z34.90 ENCOUNTER FOR ELECTIVE INDUCTION OF LABOR: Status: ACTIVE | Noted: 2024-07-27

## 2024-07-27 LAB
ABO GROUP BLD: NORMAL
BLD GP AB SCN SERPL QL: POSITIVE
BLOOD GROUP ANTIBODIES SERPL: NORMAL
ERYTHROCYTE [DISTWIDTH] IN BLOOD BY AUTOMATED COUNT: 22.5 % (ref 11.6–15.1)
HCT VFR BLD AUTO: 40.1 % (ref 34.8–46.1)
HGB BLD-MCNC: 13.2 G/DL (ref 11.5–15.4)
HOLD SPECIMEN: YES
MCH RBC QN AUTO: 29.4 PG (ref 26.8–34.3)
MCHC RBC AUTO-ENTMCNC: 32.9 G/DL (ref 31.4–37.4)
MCV RBC AUTO: 89 FL (ref 82–98)
PLATELET # BLD AUTO: 185 THOUSANDS/UL (ref 149–390)
PMV BLD AUTO: 11.4 FL (ref 8.9–12.7)
RBC # BLD AUTO: 4.49 MILLION/UL (ref 3.81–5.12)
RH BLD: NEGATIVE
SPECIMEN EXPIRATION DATE: NORMAL
WBC # BLD AUTO: 7.33 THOUSAND/UL (ref 4.31–10.16)

## 2024-07-27 PROCEDURE — 86900 BLOOD TYPING SEROLOGIC ABO: CPT

## 2024-07-27 PROCEDURE — 4A1HXCZ MONITORING OF PRODUCTS OF CONCEPTION, CARDIAC RATE, EXTERNAL APPROACH: ICD-10-PCS | Performed by: OBSTETRICS & GYNECOLOGY

## 2024-07-27 PROCEDURE — 86780 TREPONEMA PALLIDUM: CPT

## 2024-07-27 PROCEDURE — 86850 RBC ANTIBODY SCREEN: CPT

## 2024-07-27 PROCEDURE — 85027 COMPLETE CBC AUTOMATED: CPT

## 2024-07-27 PROCEDURE — NC001 PR NO CHARGE: Performed by: STUDENT IN AN ORGANIZED HEALTH CARE EDUCATION/TRAINING PROGRAM

## 2024-07-27 PROCEDURE — 86870 RBC ANTIBODY IDENTIFICATION: CPT

## 2024-07-27 PROCEDURE — 86901 BLOOD TYPING SEROLOGIC RH(D): CPT

## 2024-07-27 RX ORDER — OXYTOCIN/RINGER'S LACTATE 30/500 ML
1-30 PLASTIC BAG, INJECTION (ML) INTRAVENOUS
Status: DISCONTINUED | OUTPATIENT
Start: 2024-07-27 | End: 2024-07-28

## 2024-07-27 RX ORDER — FENTANYL CITRATE 50 UG/ML
INJECTION, SOLUTION INTRAMUSCULAR; INTRAVENOUS
Status: COMPLETED
Start: 2024-07-27 | End: 2024-07-27

## 2024-07-27 RX ORDER — ROPIVACAINE HYDROCHLORIDE 2 MG/ML
INJECTION, SOLUTION EPIDURAL; INFILTRATION; PERINEURAL CONTINUOUS PRN
Status: DISCONTINUED | OUTPATIENT
Start: 2024-07-27 | End: 2024-07-28

## 2024-07-27 RX ORDER — LIDOCAINE HYDROCHLORIDE AND EPINEPHRINE 15; 5 MG/ML; UG/ML
INJECTION, SOLUTION EPIDURAL
Status: COMPLETED | OUTPATIENT
Start: 2024-07-27 | End: 2024-07-27

## 2024-07-27 RX ORDER — SODIUM CHLORIDE, SODIUM LACTATE, POTASSIUM CHLORIDE, CALCIUM CHLORIDE 600; 310; 30; 20 MG/100ML; MG/100ML; MG/100ML; MG/100ML
75 INJECTION, SOLUTION INTRAVENOUS CONTINUOUS
Status: DISCONTINUED | OUTPATIENT
Start: 2024-07-27 | End: 2024-07-28

## 2024-07-27 RX ORDER — SODIUM CHLORIDE, SODIUM LACTATE, POTASSIUM CHLORIDE, CALCIUM CHLORIDE 600; 310; 30; 20 MG/100ML; MG/100ML; MG/100ML; MG/100ML
125 INJECTION, SOLUTION INTRAVENOUS CONTINUOUS
Status: DISCONTINUED | OUTPATIENT
Start: 2024-07-27 | End: 2024-07-28

## 2024-07-27 RX ORDER — ONDANSETRON 2 MG/ML
4 INJECTION INTRAMUSCULAR; INTRAVENOUS EVERY 6 HOURS PRN
Status: DISCONTINUED | OUTPATIENT
Start: 2024-07-27 | End: 2024-07-28

## 2024-07-27 RX ORDER — BUPIVACAINE HYDROCHLORIDE 2.5 MG/ML
INJECTION, SOLUTION EPIDURAL; INFILTRATION; INTRACAUDAL AS NEEDED
Status: DISCONTINUED | OUTPATIENT
Start: 2024-07-27 | End: 2024-07-28

## 2024-07-27 RX ORDER — FENTANYL CITRATE 50 UG/ML
INJECTION, SOLUTION INTRAMUSCULAR; INTRAVENOUS AS NEEDED
Status: DISCONTINUED | OUTPATIENT
Start: 2024-07-27 | End: 2024-07-28

## 2024-07-27 RX ORDER — CALCIUM CARBONATE 500 MG/1
1000 TABLET, CHEWABLE ORAL 3 TIMES DAILY PRN
Status: DISCONTINUED | OUTPATIENT
Start: 2024-07-27 | End: 2024-07-28

## 2024-07-27 RX ORDER — BUPIVACAINE HYDROCHLORIDE 2.5 MG/ML
30 INJECTION, SOLUTION EPIDURAL; INFILTRATION; INTRACAUDAL ONCE AS NEEDED
Status: DISCONTINUED | OUTPATIENT
Start: 2024-07-27 | End: 2024-07-28

## 2024-07-27 RX ADMIN — SODIUM CHLORIDE, SODIUM LACTATE, POTASSIUM CHLORIDE, AND CALCIUM CHLORIDE 125 ML/HR: .6; .31; .03; .02 INJECTION, SOLUTION INTRAVENOUS at 12:34

## 2024-07-27 RX ADMIN — LIDOCAINE HYDROCHLORIDE AND EPINEPHRINE 2 ML: 15; 5 INJECTION, SOLUTION EPIDURAL at 12:34

## 2024-07-27 RX ADMIN — FENTANYL CITRATE 100 MCG: 50 INJECTION INTRAMUSCULAR; INTRAVENOUS at 21:09

## 2024-07-27 RX ADMIN — ROPIVACAINE HYDROCHLORIDE 8 ML/HR: 2 INJECTION EPIDURAL; INFILTRATION; PERINEURAL at 12:40

## 2024-07-27 RX ADMIN — BUPIVACAINE HYDROCHLORIDE 4 ML: 2.5 INJECTION, SOLUTION EPIDURAL; INFILTRATION; INTRACAUDAL; PERINEURAL at 21:09

## 2024-07-27 RX ADMIN — SODIUM CHLORIDE, SODIUM LACTATE, POTASSIUM CHLORIDE, AND CALCIUM CHLORIDE 300 ML: .6; .31; .03; .02 INJECTION, SOLUTION INTRAVENOUS at 20:00

## 2024-07-27 RX ADMIN — SODIUM CHLORIDE, SODIUM LACTATE, POTASSIUM CHLORIDE, AND CALCIUM CHLORIDE 125 ML/HR: .6; .31; .03; .02 INJECTION, SOLUTION INTRAVENOUS at 09:31

## 2024-07-27 RX ADMIN — ROPIVACAINE HYDROCHLORIDE 4 ML: 2 INJECTION EPIDURAL; INFILTRATION; PERINEURAL at 12:37

## 2024-07-27 RX ADMIN — ROPIVACAINE HYDROCHLORIDE: 2 INJECTION, SOLUTION EPIDURAL; INFILTRATION at 12:42

## 2024-07-27 RX ADMIN — SODIUM CHLORIDE, SODIUM LACTATE, POTASSIUM CHLORIDE, AND CALCIUM CHLORIDE 999 ML/HR: .6; .31; .03; .02 INJECTION, SOLUTION INTRAVENOUS at 18:35

## 2024-07-27 RX ADMIN — Medication 2 MILLI-UNITS/MIN: at 09:31

## 2024-07-27 RX ADMIN — BUPIVACAINE HYDROCHLORIDE 4 ML: 2.5 INJECTION, SOLUTION EPIDURAL; INFILTRATION; INTRACAUDAL; PERINEURAL at 21:11

## 2024-07-27 RX ADMIN — LIDOCAINE HYDROCHLORIDE AND EPINEPHRINE 3 ML: 15; 5 INJECTION, SOLUTION EPIDURAL at 12:31

## 2024-07-27 RX ADMIN — ROPIVACAINE HYDROCHLORIDE: 2 INJECTION, SOLUTION EPIDURAL; INFILTRATION at 19:45

## 2024-07-27 NOTE — ASSESSMENT & PLAN NOTE
Admit to OBGYN   Clear liquid diet, IVF LR 125cc/hr   F/u T&S, CBC, RPR   GBS negative; EFW: 39%   SVE: 1/50/-3  Continuous fetal monitoring and tocometry   Analgesia at maternal request   Vertex by TAUS  Contraception plan: condoms  Plan: Holder balloon and pitocin

## 2024-07-27 NOTE — OB LABOR/OXYTOCIN SAFETY PROGRESS
Oxytocin Safety Progress Check Note - Ana Banuelos 23 y.o. female MRN: [id number]    Unit/Bed#: -01 Encounter: [id number]    Dose (niki-units/min) Oxytocin: 10 niki-units/min  Contraction Frequency (minutes): 1.5-3.5  Contraction Intensity: Mild  Uterine Activity Characteristics: Regular  Cervical Dilation: 3        Cervical Effacement: 70  Fetal Station: -2  Baseline Rate (FHR): 135 bpm  Fetal Heart Rate (FHT): 140 BPM  FHR Category: 1               Vital Signs:   Vitals:    07/27/24 1147   BP: 120/69   Pulse: 82   Resp:    Temp:        Notes/comments:   Patient more uncomfortable desires epidural.  Continue EFM, toco.  Continue titrating Pitocin as needed once comfortable.  Discussed with MULU Izaguirre 7/27/2024 11:55 AM

## 2024-07-27 NOTE — ANESTHESIA PREPROCEDURE EVALUATION
Procedure:  LABOR ANALGESIA    Relevant Problems   GYN   (+) 40 weeks gestation of pregnancy   (+) Encounter for supervision of other normal pregnancy, third trimester      HEMATOLOGY   (+) Anemia   (+) Anemia of pregnancy in third trimester        Physical Exam    Airway    Mallampati score: II  TM Distance: >3 FB  Neck ROM: full     Dental   No notable dental hx     Cardiovascular  Rhythm: regular, Rate: normal, Cardiovascular exam normal    Pulmonary  Pulmonary exam normal Breath sounds clear to auscultation    Other Findings  post-pubertal.      Anesthesia Plan  ASA Score- 2     Anesthesia Type- epidural with ASA Monitors.         Additional Monitors:     Airway Plan:            Plan Factors-    Chart reviewed.   Existing labs reviewed. Patient summary reviewed.                  Induction-     Postoperative Plan-     Perioperative Resuscitation Plan - Level 1 - Full Code.       Informed Consent- Anesthetic plan and risks discussed with patient.

## 2024-07-27 NOTE — OB LABOR/OXYTOCIN SAFETY PROGRESS
Oxytocin Safety Progress Check Note - Ana Banuelos 23 y.o. female MRN: [id number]    Unit/Bed#: -01 Encounter: [id number]    Dose (niki-units/min) Oxytocin: 12 niki-units/min  Contraction Frequency (minutes): 2-5  Contraction Intensity: Mild/Moderate  Uterine Activity Characteristics: Regular  Cervical Dilation: 3-4        Cervical Effacement: 70  Fetal Station: -3  Baseline Rate (FHR): 135 bpm  Fetal Heart Rate (FHT): 130 BPM  FHR Category: 1               Vital Signs:   Vitals:    07/27/24 1543   BP: 119/65   Pulse: 96   Resp:    Temp:    SpO2:        Notes/comments:   SVE deferred. Category I tracing. Continue pitocin titration.      Gina Landa MD 7/27/2024 4:01 PM

## 2024-07-27 NOTE — PLAN OF CARE

## 2024-07-27 NOTE — ANESTHESIA PROCEDURE NOTES
Epidural Block    Patient location during procedure: OB/L&D  Start time: 7/27/2024 12:31 PM  Reason for block: primary anesthetic  Staffing  Performed by: Ritesh Rutherford DO  Authorized by: Ritesh Rutherford DO    Preanesthetic Checklist  Completed: patient identified, IV checked, risks and benefits discussed, surgical consent, monitors and equipment checked, pre-op evaluation and timeout performed  Epidural  Patient position: sitting  Prep: Betadine  Sedation Level: no sedation  Patient monitoring: frequent blood pressure checks, continuous pulse oximetry and heart rate  Approach: midline  Location: lumbar, L3-4  Injection technique: VINAY saline  Needle  Needle type: Tuohy   Needle gauge: 17 G  Needle insertion depth: 5 cm  Catheter type: multi-orifice  Catheter size: 19 G  Catheter at skin depth: 10 cm  Catheter securement method: stabilization device and clear occlusive dressing  Test dose: negativelidocaine-epinephrine (XYLOCAINE-MPF/EPINEPHRINE) 1.5 %-1:200,000 injection 3 mL - Epidural   3 mL - 7/27/2024 12:31:00 PM  Assessment  Sensory level: T10  Number of attempts: 1negative aspiration for CSF, negative aspiration for heme and no paresthesia on injection  patient tolerated the procedure well with no immediate complications           Female

## 2024-07-27 NOTE — ASSESSMENT & PLAN NOTE
QBL: 135cc; Hemoglobin 13.2g/dL --> 11.5 g/dL  Pain regimen: s/p Duramorph; esequiel Tylenol, esequiel Toradol->Motrin  Continue bowel regimen  VTE prophylaxis: SCDs (BMI 27)  Voiding spontaneously, s/p ruiz  Encourage ambulation and breastfeeding/pumping  Contraception: condoms   Purse String (Intermediate) Text: Given the location of the defect and the characteristics of the surrounding skin a purse string intermediate closure was deemed most appropriate.  Undermining was performed circumfirentially around the surgical defect.  A purse string suture was then placed and tightened.

## 2024-07-27 NOTE — OB LABOR/OXYTOCIN SAFETY PROGRESS
Labor Progress Note - Ana Banuelos 23 y.o. female MRN: [id number]    Unit/Bed#: -01 Encounter: [id number]    Dose (niki-units/min) Oxytocin: 14 niki-units/min  Contraction Frequency (minutes): 2.5-6  Contraction Intensity: Mild/Moderate  Uterine Activity Characteristics: Regular  Cervical Dilation: 3-4        Cervical Effacement: 0  Fetal Station: -3  Baseline Rate (FHR): 140 bpm  Fetal Heart Rate (FHT): 130 BPM  FHR Category: II               Vital Signs:   Vitals:    07/27/24 1314   BP: 118/59   Pulse: 80   Resp: 16   Temp: 98.5 °F (36.9 °C)   SpO2:        Notes/comments:   Came to evaluate pt due to intermittent variable decelerations. SVE as above. Pt repositioned to left side, pitocin discontinued. Will restart when appropriate. Otherwise moderate variability and reassuring features       Seema Last MD 7/27/2024 1:35 PM

## 2024-07-27 NOTE — OB LABOR/OXYTOCIN SAFETY PROGRESS
Labor Progress Note - Ana Banuelos 23 y.o. female MRN: [id number]    Unit/Bed#: -01 Encounter: [id number]    Dose (niki-units/min) Oxytocin: 16 niki-units/min  Contraction Frequency (minutes): 2-5  Contraction Intensity: Mild/Moderate  Uterine Activity Characteristics: Regular  Cervical Dilation: 3-4        Cervical Effacement: 70  Fetal Station: -3  Baseline Rate (FHR): 140 bpm  Fetal Heart Rate (FHT): 130 BPM  FHR Category: I               Vital Signs:   Vitals:    07/27/24 1714   BP: 118/67   Pulse: 86   Resp:    Temp:    SpO2:        Notes/comments:   Pt is comfortable. SVE unchanged. Some occasional variable decelerations but most recently category I.       Seema Last MD 7/27/2024 5:40 PM

## 2024-07-27 NOTE — H&P
H & P- Obstetrics   Ana Banuelos 23 y.o. female MRN: [id number]  Unit/Bed#: -01 Encounter: [id number]    Assessment: 23 y.o.  at 40w2d admitted for elective induction of labor    Plan:   Encounter for elective induction of labor  Assessment & Plan  Admit to OBGYN   Clear liquid diet, IVF LR 125cc/hr   F/u T&S, CBC, RPR   GBS negative; EFW: 39%   SVE: /-3  Continuous fetal monitoring and tocometry   Analgesia at maternal request   Vertex by TAUS  Contraception plan: condoms  Plan: Holder balloon and pitocin    Anemia of pregnancy in third trimester  Assessment & Plan  S/p outpatient venofer infusions  Most recent Hgb 12.5, follow up admission CBC    Fetal ventricular septal defect affecting antepartum care of mother  Assessment & Plan  Fetal echo 24:  -There is a muscular VSD with otherwise normal cardiac anatomy.   -Normal prenatal care, delivery, and  care are recommended.   -Recommend  outpatient cardiology consult with echocardiogram at 1-2 weeks of life (Please schedule appointment prior to hospital discharge. Phone [phone]).    Rubella non-immune status, antepartum  Assessment & Plan  Offer MMR postpartum    Rh negative state in antepartum period  Assessment & Plan  S/p rhogam injection at 28 weeks  Order rhogam panel postpartum    * 40 weeks gestation of pregnancy  Assessment & Plan  Up to date on prenatal care  Prenatal labs wnl        Discussed case and plan w/ Dr. Last      Chief Complaint: here for induction    HPI: Ana Banuelos is a 23 y.o.  with an BETHANY of 2024, by Last Menstrual Period at 40w2d who is being admitted for elective induction of labor. She denies having uterine contractions, has no LOF, and reports no VB. She states she has felt good FM.    Patient Active Problem List   Diagnosis    Encounter for supervision of other normal pregnancy, third trimester    40 weeks gestation of pregnancy    Family history of cerebral palsy    Rh  negative state in antepartum period    Rubella non-immune status, antepartum    Fetal ventricular septal defect affecting antepartum care of mother    Anemia    Anemia of pregnancy in third trimester    Encounter for elective induction of labor       Baby complications/comments: Fetal VSD    Review of Systems   Constitutional:  Negative for chills and fever.   HENT: Negative.     Respiratory:  Negative for cough and shortness of breath.    Cardiovascular:  Negative for chest pain.   Gastrointestinal:  Negative for abdominal pain, nausea and vomiting.   Genitourinary: Negative.  Negative for vaginal bleeding.   Musculoskeletal: Negative.    Neurological:  Negative for headaches.   Psychiatric/Behavioral: Negative.         OB Hx:  OB History    Para Term  AB Living   2 0 0   1 0   SAB IAB Ectopic Multiple Live Births   1 0 0 0 0      # Outcome Date GA Lbr Fabien/2nd Weight Sex Type Anes PTL Lv   2 Current            1 SAB 23     Biochemical          Past Medical Hx:  Past Medical History:   Diagnosis Date    Asthma     childhood     Past Surgical hx:  No past surgical history on file.    Social Hx:  Alcohol use: denies  Tobacco use: denies  Other substance use: denies    No Known Allergies      Medications Prior to Admission:     Prenatal Vit-Fe Fumarate-FA (PRENATAL 1+1 PO)    Objective:  Temp:  [98.6 °F (37 °C)] 98.6 °F (37 °C)  HR:  [84-85] 85  Resp:  [17] 17  BP: (118-123)/(64-79) 123/64  Body mass index is 27.46 kg/m².     Physical Exam:  Physical Exam  HENT:      Head: Normocephalic and atraumatic.      Mouth/Throat:      Mouth: Mucous membranes are moist.   Cardiovascular:      Rate and Rhythm: Normal rate.      Pulses: Normal pulses.   Pulmonary:      Effort: Pulmonary effort is normal. No respiratory distress.      Breath sounds: Normal breath sounds.   Abdominal:      General: There is no distension.   Neurological:      General: No focal deficit present.      Mental Status: She is alert.    Skin:     General: Skin is warm and dry.   Psychiatric:         Mood and Affect: Mood normal.         Behavior: Behavior normal.   Vitals reviewed.        FHT:  Baseline Rate (FHR): 135 bpm  Variability: Moderate  Accelerations: 15 x 15 or greater  Decelerations: None  FHR Category: Category I    TOCO:   Contraction Frequency (minutes): 3-7  Contraction Duration (seconds): 60-90  Contraction Intensity: Mild    Lab Results   Component Value Date    WBC 7.33 07/27/2024    HGB 13.2 07/27/2024    HCT 40.1 07/27/2024     07/27/2024     Lab Results   Component Value Date    K 3.8 07/17/2023    CO2 28.2 07/17/2023    BUN 9 07/17/2023    CREATININE 0.69 07/17/2023    AST 14 07/17/2023    ALT 6 (L) 07/17/2023       Prenatal Labs: Reviewed      Blood type: A negative  Antibody: negative  GBS: negative  HIV: Non-reactive  Rubella: Non-Immune  Syphilis IgM/IgG: Non-reactive  HBsAg: Non-reactive  HCAb: Non-reactive  Chlamydia: Negative  Gonorrhea: Negative  Diabetes 1 hour screen: 81  Platelets: 236k  Hgb: 9.1, most recently 12.5    >2 Midnights  INPATIENT     Signature/Title: Gina Landa MD  Date: 7/27/2024  Time: 9:13 AM

## 2024-07-27 NOTE — OB LABOR/OXYTOCIN SAFETY PROGRESS
Oxytocin Safety Progress Check Note - Ana Banuelos 23 y.o. female MRN: [id number]    Unit/Bed#: -01 Encounter: [id number]    Dose (niki-units/min) Oxytocin: 22 niki-units/min  Contraction Frequency (minutes): 3-4  Contraction Intensity: Mild/Moderate  Uterine Activity Characteristics: Regular  Cervical Dilation: 4        Cervical Effacement: 80  Fetal Station: -2  Baseline Rate (FHR): 140 bpm  Fetal Heart Rate (FHT): 130 BPM  FHR Category: 2               Vital Signs:   Vitals:    07/27/24 1843   BP: 100/55   Pulse: 79   Resp:    Temp:    SpO2:        Notes/comments:   SVE as above. Variable decelerations noted, repositioned. IUPC placed, will start amnioinfusion. D/w Dr. Shala Landa MD 7/27/2024 7:47 PM

## 2024-07-27 NOTE — OB LABOR/OXYTOCIN SAFETY PROGRESS
Labor Progress Note - Ana Banuelos 23 y.o. female MRN: [id number]    Unit/Bed#: -01 Encounter: [id number]       Contraction Frequency (minutes): 3-7  Contraction Intensity: Mild  Uterine Activity Characteristics: Irregular  Cervical Dilation: 1        Cervical Effacement: 50  Fetal Station: -3  Baseline Rate (FHR): 135 bpm  Fetal Heart Rate (FHT): 140 BPM  FHR Category: 1               Vital Signs:   Vitals:    07/27/24 0801   BP:    Pulse:    Resp: 17   Temp:        Notes/comments:    PROCEDURE:  RUIZ BALLOON PLACEMENT    A 24F ruiz with a 30cc balloon was selected, SVE was performed and cervix was located, ruiz was introduced over sterile gloved hands. Balloon advanced through cervix beyond the internal cervical os. A small amount amount of sterile saline solution was instilled in the balloon to confirm placement. Placement was confirmed to be beyond the internal cervical os. A total of 60cc of sterile saline solution was placed into the balloon. Pt tolerated well. Clear fluid then noted to come through the ruiz and membranes were noted to rupture. Will leave ruiz in place. Instructions left with RN to place ruiz to gravity with a 1L bag of IV fluid. Notify MD when ruiz dislodged.      Gina Landa MD 7/27/2024 8:07 AM

## 2024-07-27 NOTE — ASSESSMENT & PLAN NOTE
Fetal echo 24:  -There is a muscular VSD with otherwise normal cardiac anatomy.   -Normal prenatal care, delivery, and  care are recommended.   -Recommend  outpatient cardiology consult with echocardiogram at 1-2 weeks of life (Please schedule appointment prior to hospital discharge. Phone [phone]).

## 2024-07-28 PROBLEM — Z98.891 STATUS POST PRIMARY LOW TRANSVERSE CESAREAN SECTION: Status: ACTIVE | Noted: 2023-12-18

## 2024-07-28 PROBLEM — Z98.891 STATUS POST PRIMARY LOW TRANSVERSE CESAREAN SECTION: Chronic | Status: ACTIVE | Noted: 2023-12-18

## 2024-07-28 LAB
ABO GROUP BLD: NORMAL
BASE EXCESS BLDCOA CALC-SCNC: -5.2 MMOL/L (ref 3–11)
BASE EXCESS BLDCOV CALC-SCNC: -5.4 MMOL/L (ref 1–9)
BLD GP AB SCN SERPL QL: POSITIVE
ERYTHROCYTE [DISTWIDTH] IN BLOOD BY AUTOMATED COUNT: 22.4 % (ref 11.6–15.1)
FETAL CELL SCN BLD QL ROSETTE: NEGATIVE
HCO3 BLDCOA-SCNC: 20.4 MMOL/L (ref 17.3–27.3)
HCO3 BLDCOV-SCNC: 19.4 MMOL/L (ref 12.2–28.6)
HCT VFR BLD AUTO: 34.8 % (ref 34.8–46.1)
HGB BLD-MCNC: 11.5 G/DL (ref 11.5–15.4)
MCH RBC QN AUTO: 29.3 PG (ref 26.8–34.3)
MCHC RBC AUTO-ENTMCNC: 33 G/DL (ref 31.4–37.4)
MCV RBC AUTO: 89 FL (ref 82–98)
O2 CT VFR BLDCOA CALC: 8.9 ML/DL
OXYHGB MFR BLDCOA: 37.5 %
OXYHGB MFR BLDCOV: 80 %
PCO2 BLDCOA: 40.2 MM[HG] (ref 30–60)
PCO2 BLDCOV: 36.4 MM HG (ref 27–43)
PH BLDCOA: 7.32 [PH] (ref 7.23–7.43)
PH BLDCOV: 7.34 [PH] (ref 7.19–7.49)
PLATELET # BLD AUTO: 159 THOUSANDS/UL (ref 149–390)
PMV BLD AUTO: 10.9 FL (ref 8.9–12.7)
PO2 BLDCOA: 17.8 MM HG (ref 5–25)
PO2 BLDCOV: 38.2 MM HG (ref 15–45)
RBC # BLD AUTO: 3.93 MILLION/UL (ref 3.81–5.12)
RH BLD: NEGATIVE
SAO2 % BLDCOV: 18.8 ML/DL
TREPONEMA PALLIDUM IGG+IGM AB [PRESENCE] IN SERUM OR PLASMA BY IMMUNOASSAY: NORMAL
WBC # BLD AUTO: 15.42 THOUSAND/UL (ref 4.31–10.16)

## 2024-07-28 PROCEDURE — 59510 CESAREAN DELIVERY: CPT | Performed by: OBSTETRICS & GYNECOLOGY

## 2024-07-28 PROCEDURE — 82805 BLOOD GASES W/O2 SATURATION: CPT | Performed by: OBSTETRICS & GYNECOLOGY

## 2024-07-28 PROCEDURE — 3E0E7GC INTRODUCTION OF OTHER THERAPEUTIC SUBSTANCE INTO PRODUCTS OF CONCEPTION, VIA NATURAL OR ARTIFICIAL OPENING: ICD-10-PCS | Performed by: OBSTETRICS & GYNECOLOGY

## 2024-07-28 PROCEDURE — 88307 TISSUE EXAM BY PATHOLOGIST: CPT | Performed by: PATHOLOGY

## 2024-07-28 PROCEDURE — 86901 BLOOD TYPING SEROLOGIC RH(D): CPT

## 2024-07-28 PROCEDURE — 86850 RBC ANTIBODY SCREEN: CPT

## 2024-07-28 PROCEDURE — 10H07YZ INSERTION OF OTHER DEVICE INTO PRODUCTS OF CONCEPTION, VIA NATURAL OR ARTIFICIAL OPENING: ICD-10-PCS | Performed by: OBSTETRICS & GYNECOLOGY

## 2024-07-28 PROCEDURE — 3E033VJ INTRODUCTION OF OTHER HORMONE INTO PERIPHERAL VEIN, PERCUTANEOUS APPROACH: ICD-10-PCS | Performed by: OBSTETRICS & GYNECOLOGY

## 2024-07-28 PROCEDURE — 85027 COMPLETE CBC AUTOMATED: CPT

## 2024-07-28 PROCEDURE — 86900 BLOOD TYPING SEROLOGIC ABO: CPT

## 2024-07-28 PROCEDURE — 85461 HEMOGLOBIN FETAL: CPT

## 2024-07-28 RX ORDER — ACETAMINOPHEN 325 MG/1
650 TABLET ORAL EVERY 6 HOURS
Status: DISCONTINUED | OUTPATIENT
Start: 2024-07-29 | End: 2024-07-30 | Stop reason: HOSPADM

## 2024-07-28 RX ORDER — ONDANSETRON 2 MG/ML
4 INJECTION INTRAMUSCULAR; INTRAVENOUS ONCE AS NEEDED
Status: DISCONTINUED | OUTPATIENT
Start: 2024-07-28 | End: 2024-07-29 | Stop reason: SDUPTHER

## 2024-07-28 RX ORDER — BENZOCAINE/MENTHOL 6 MG-10 MG
1 LOZENGE MUCOUS MEMBRANE DAILY PRN
Status: DISCONTINUED | OUTPATIENT
Start: 2024-07-28 | End: 2024-07-30 | Stop reason: HOSPADM

## 2024-07-28 RX ORDER — SODIUM CHLORIDE, SODIUM LACTATE, POTASSIUM CHLORIDE, CALCIUM CHLORIDE 600; 310; 30; 20 MG/100ML; MG/100ML; MG/100ML; MG/100ML
125 INJECTION, SOLUTION INTRAVENOUS CONTINUOUS
Status: DISCONTINUED | OUTPATIENT
Start: 2024-07-28 | End: 2024-07-30 | Stop reason: HOSPADM

## 2024-07-28 RX ORDER — METOCLOPRAMIDE HYDROCHLORIDE 5 MG/ML
5 INJECTION INTRAMUSCULAR; INTRAVENOUS EVERY 6 HOURS PRN
Status: ACTIVE | OUTPATIENT
Start: 2024-07-28 | End: 2024-07-29

## 2024-07-28 RX ORDER — MORPHINE SULFATE 0.5 MG/ML
INJECTION, SOLUTION EPIDURAL; INTRATHECAL; INTRAVENOUS AS NEEDED
Status: DISCONTINUED | OUTPATIENT
Start: 2024-07-28 | End: 2024-07-28

## 2024-07-28 RX ORDER — PHENYLEPHRINE HYDROCHLORIDE 10 MG/ML
INJECTION INTRAVENOUS
Status: DISPENSED
Start: 2024-07-28 | End: 2024-07-28

## 2024-07-28 RX ORDER — IBUPROFEN 600 MG/1
600 TABLET ORAL EVERY 6 HOURS
Status: DISCONTINUED | OUTPATIENT
Start: 2024-07-29 | End: 2024-07-30 | Stop reason: HOSPADM

## 2024-07-28 RX ORDER — ONDANSETRON 2 MG/ML
4 INJECTION INTRAMUSCULAR; INTRAVENOUS EVERY 6 HOURS PRN
Status: ACTIVE | OUTPATIENT
Start: 2024-07-28 | End: 2024-07-29

## 2024-07-28 RX ORDER — DIPHENHYDRAMINE HYDROCHLORIDE 50 MG/ML
25 INJECTION INTRAMUSCULAR; INTRAVENOUS EVERY 6 HOURS PRN
Status: ACTIVE | OUTPATIENT
Start: 2024-07-28 | End: 2024-07-29

## 2024-07-28 RX ORDER — ONDANSETRON 2 MG/ML
4 INJECTION INTRAMUSCULAR; INTRAVENOUS EVERY 8 HOURS PRN
Status: DISCONTINUED | OUTPATIENT
Start: 2024-07-28 | End: 2024-07-30 | Stop reason: HOSPADM

## 2024-07-28 RX ORDER — LIDOCAINE HCL/EPINEPHRINE/PF 2%-1:200K
VIAL (ML) INJECTION AS NEEDED
Status: DISCONTINUED | OUTPATIENT
Start: 2024-07-28 | End: 2024-07-28

## 2024-07-28 RX ORDER — DIPHENHYDRAMINE HCL 25 MG
25 TABLET ORAL EVERY 6 HOURS PRN
Status: DISCONTINUED | OUTPATIENT
Start: 2024-07-28 | End: 2024-07-30 | Stop reason: HOSPADM

## 2024-07-28 RX ORDER — MAGNESIUM HYDROXIDE/ALUMINUM HYDROXICE/SIMETHICONE 120; 1200; 1200 MG/30ML; MG/30ML; MG/30ML
15 SUSPENSION ORAL EVERY 6 HOURS PRN
Status: DISCONTINUED | OUTPATIENT
Start: 2024-07-28 | End: 2024-07-30 | Stop reason: HOSPADM

## 2024-07-28 RX ORDER — OXYCODONE HYDROCHLORIDE 5 MG/1
10 TABLET ORAL EVERY 4 HOURS PRN
Status: DISCONTINUED | OUTPATIENT
Start: 2024-07-29 | End: 2024-07-30 | Stop reason: HOSPADM

## 2024-07-28 RX ORDER — KETOROLAC TROMETHAMINE 30 MG/ML
15 INJECTION, SOLUTION INTRAMUSCULAR; INTRAVENOUS EVERY 6 HOURS SCHEDULED
Status: DISPENSED | OUTPATIENT
Start: 2024-07-28 | End: 2024-07-29

## 2024-07-28 RX ORDER — ACETAMINOPHEN 325 MG/1
975 TABLET ORAL EVERY 6 HOURS PRN
Status: ACTIVE | OUTPATIENT
Start: 2024-07-28 | End: 2024-07-29

## 2024-07-28 RX ORDER — NALOXONE HYDROCHLORIDE 0.4 MG/ML
0.1 INJECTION, SOLUTION INTRAMUSCULAR; INTRAVENOUS; SUBCUTANEOUS
Status: ACTIVE | OUTPATIENT
Start: 2024-07-28 | End: 2024-07-29

## 2024-07-28 RX ORDER — OXYTOCIN/RINGER'S LACTATE 30/500 ML
62.5 PLASTIC BAG, INJECTION (ML) INTRAVENOUS ONCE
Status: DISCONTINUED | OUTPATIENT
Start: 2024-07-28 | End: 2024-07-30 | Stop reason: HOSPADM

## 2024-07-28 RX ORDER — CALCIUM CARBONATE 500 MG/1
1000 TABLET, CHEWABLE ORAL DAILY PRN
Status: DISCONTINUED | OUTPATIENT
Start: 2024-07-28 | End: 2024-07-30 | Stop reason: HOSPADM

## 2024-07-28 RX ORDER — ONDANSETRON 2 MG/ML
INJECTION INTRAMUSCULAR; INTRAVENOUS
Status: COMPLETED
Start: 2024-07-28 | End: 2024-07-28

## 2024-07-28 RX ORDER — OXYCODONE HYDROCHLORIDE 5 MG/1
5 TABLET ORAL EVERY 4 HOURS PRN
Status: DISCONTINUED | OUTPATIENT
Start: 2024-07-29 | End: 2024-07-30 | Stop reason: HOSPADM

## 2024-07-28 RX ORDER — KETOROLAC TROMETHAMINE 30 MG/ML
30 INJECTION, SOLUTION INTRAMUSCULAR; INTRAVENOUS EVERY 6 HOURS PRN
Status: DISPENSED | OUTPATIENT
Start: 2024-07-28 | End: 2024-07-29

## 2024-07-28 RX ORDER — ACETAMINOPHEN 325 MG/1
650 TABLET ORAL EVERY 6 HOURS SCHEDULED
Status: DISCONTINUED | OUTPATIENT
Start: 2024-07-28 | End: 2024-07-28

## 2024-07-28 RX ORDER — POLYETHYLENE GLYCOL 3350 17 G/17G
17 POWDER, FOR SOLUTION ORAL DAILY
Status: DISCONTINUED | OUTPATIENT
Start: 2024-07-28 | End: 2024-07-30 | Stop reason: HOSPADM

## 2024-07-28 RX ORDER — LIDOCAINE HCL/EPINEPHRINE/PF 2%-1:200K
VIAL (ML) INJECTION
Status: COMPLETED
Start: 2024-07-28 | End: 2024-07-28

## 2024-07-28 RX ORDER — MORPHINE SULFATE 0.5 MG/ML
INJECTION, SOLUTION EPIDURAL; INTRATHECAL; INTRAVENOUS
Status: COMPLETED
Start: 2024-07-28 | End: 2024-07-28

## 2024-07-28 RX ORDER — OXYCODONE HYDROCHLORIDE 5 MG/1
5 TABLET ORAL EVERY 4 HOURS PRN
Status: ACTIVE | OUTPATIENT
Start: 2024-07-28 | End: 2024-07-29

## 2024-07-28 RX ORDER — CEFAZOLIN SODIUM 1 G/50ML
1000 SOLUTION INTRAVENOUS ONCE
Status: COMPLETED | OUTPATIENT
Start: 2024-07-28 | End: 2024-07-28

## 2024-07-28 RX ORDER — NALBUPHINE HYDROCHLORIDE 10 MG/ML
3 INJECTION, SOLUTION INTRAMUSCULAR; INTRAVENOUS; SUBCUTANEOUS
Status: DISPENSED | OUTPATIENT
Start: 2024-07-28 | End: 2024-07-29

## 2024-07-28 RX ORDER — ONDANSETRON 2 MG/ML
INJECTION INTRAMUSCULAR; INTRAVENOUS AS NEEDED
Status: DISCONTINUED | OUTPATIENT
Start: 2024-07-28 | End: 2024-07-28

## 2024-07-28 RX ORDER — OXYTOCIN/RINGER'S LACTATE 30/500 ML
PLASTIC BAG, INJECTION (ML) INTRAVENOUS
Status: COMPLETED
Start: 2024-07-28 | End: 2024-07-28

## 2024-07-28 RX ORDER — SIMETHICONE 80 MG
80 TABLET,CHEWABLE ORAL 4 TIMES DAILY PRN
Status: DISCONTINUED | OUTPATIENT
Start: 2024-07-28 | End: 2024-07-30 | Stop reason: HOSPADM

## 2024-07-28 RX ORDER — FENTANYL CITRATE/PF 50 MCG/ML
50 SYRINGE (ML) INJECTION
Status: DISCONTINUED | OUTPATIENT
Start: 2024-07-28 | End: 2024-07-30 | Stop reason: HOSPADM

## 2024-07-28 RX ORDER — HYDROMORPHONE HCL/PF 1 MG/ML
0.5 SYRINGE (ML) INJECTION EVERY 2 HOUR PRN
Status: ACTIVE | OUTPATIENT
Start: 2024-07-28 | End: 2024-07-29

## 2024-07-28 RX ADMIN — CEFAZOLIN SODIUM 1000 MG: 1 SOLUTION INTRAVENOUS at 01:24

## 2024-07-28 RX ADMIN — LIDOCAINE HYDROCHLORIDE,EPINEPHRINE BITARTRATE 5 ML: 20; .005 INJECTION, SOLUTION EPIDURAL; INFILTRATION; INTRACAUDAL; PERINEURAL at 01:14

## 2024-07-28 RX ADMIN — MORPHINE SULFATE 3 MG: 0.5 INJECTION EPIDURAL; INTRATHECAL; INTRAVENOUS at 01:39

## 2024-07-28 RX ADMIN — KETOROLAC TROMETHAMINE 30 MG: 30 INJECTION, SOLUTION INTRAMUSCULAR; INTRAVENOUS at 18:33

## 2024-07-28 RX ADMIN — PHENYLEPHRINE HYDROCHLORIDE 25 MCG/MIN: 10 INJECTION INTRAVENOUS at 01:22

## 2024-07-28 RX ADMIN — KETOROLAC TROMETHAMINE 30 MG: 30 INJECTION, SOLUTION INTRAMUSCULAR; INTRAVENOUS at 10:31

## 2024-07-28 RX ADMIN — LIDOCAINE HYDROCHLORIDE,EPINEPHRINE BITARTRATE 5 ML: 20; .005 INJECTION, SOLUTION EPIDURAL; INFILTRATION; INTRACAUDAL; PERINEURAL at 01:10

## 2024-07-28 RX ADMIN — AZITHROMYCIN 500 MG: 500 INJECTION, POWDER, LYOPHILIZED, FOR SOLUTION INTRAVENOUS at 01:27

## 2024-07-28 RX ADMIN — SODIUM CHLORIDE, SODIUM LACTATE, POTASSIUM CHLORIDE, AND CALCIUM CHLORIDE 1000 ML: .6; .31; .03; .02 INJECTION, SOLUTION INTRAVENOUS at 14:33

## 2024-07-28 RX ADMIN — KETOROLAC TROMETHAMINE 30 MG: 30 INJECTION, SOLUTION INTRAMUSCULAR; INTRAVENOUS at 04:23

## 2024-07-28 RX ADMIN — RHO(D) IMMUNE GLOBULIN (HUMAN) 300 MCG: 1500 SOLUTION INTRAMUSCULAR at 21:02

## 2024-07-28 RX ADMIN — ACETAMINOPHEN 650 MG: 325 TABLET, FILM COATED ORAL at 14:55

## 2024-07-28 RX ADMIN — LIDOCAINE HYDROCHLORIDE,EPINEPHRINE BITARTRATE 5 ML: 20; .005 INJECTION, SOLUTION EPIDURAL; INFILTRATION; INTRACAUDAL; PERINEURAL at 01:17

## 2024-07-28 RX ADMIN — SODIUM CHLORIDE, SODIUM LACTATE, POTASSIUM CHLORIDE, AND CALCIUM CHLORIDE 125 ML/HR: .6; .31; .03; .02 INJECTION, SOLUTION INTRAVENOUS at 14:34

## 2024-07-28 RX ADMIN — ONDANSETRON 4 MG: 2 INJECTION INTRAMUSCULAR; INTRAVENOUS at 01:24

## 2024-07-28 RX ADMIN — Medication 250 MILLI-UNITS/MIN: at 02:00

## 2024-07-28 RX ADMIN — LIDOCAINE HYDROCHLORIDE,EPINEPHRINE BITARTRATE 5 ML: 20; .005 INJECTION, SOLUTION EPIDURAL; INFILTRATION; INTRACAUDAL; PERINEURAL at 01:12

## 2024-07-28 RX ADMIN — ACETAMINOPHEN 650 MG: 325 TABLET, FILM COATED ORAL at 20:56

## 2024-07-28 NOTE — OB LABOR/OXYTOCIN SAFETY PROGRESS
Labor Progress Note - Ana Banuelos 23 y.o. female MRN: [id number]    Unit/Bed#: -01 Encounter: [id number]    Dose (niki-units/min) Oxytocin: 0 niki-units/min  Contraction Frequency (minutes): 2-4  Contraction Intensity: Mild/Moderate  Uterine Activity Characteristics: Regular  Cervical Dilation: 5        Cervical Effacement: 80  Fetal Station: -2  Baseline Rate (FHR): 135 bpm  Fetal Heart Rate (FHT): 130 BPM  FHR Category: 2               Vital Signs:   Vitals:    07/27/24 2244   BP: 118/65   Pulse: 85   Resp:    Temp: 98.3 °F (36.8 °C)   SpO2:        Notes/comments:   SVE as above, patient unchanged. Pitocin turned off again recently after being at 2 due to recurrent late decels. Continued late decelerations noted.     Gina Landa MD 7/27/2024 11:51 PM

## 2024-07-28 NOTE — ANESTHESIA POSTPROCEDURE EVALUATION
Post-Op Assessment Note    CV Status:  Stable    Pain management: adequate      Post-op block assessment: catheter intact and no complications   Mental Status:  Alert and awake   Hydration Status:  Euvolemic   PONV Controlled:  Controlled   Airway Patency:  Patent     Post Op Vitals Reviewed: Yes    No anethesia notable event occurred.    Staff: Anesthesiologist               /60 (07/28/24 0230)    Temp 98.6 °F (37 °C) (07/28/24 0220)    Pulse (!) 110 (07/28/24 0230)   Resp 18 (07/28/24 0230)    SpO2 98 % (07/28/24 0230)

## 2024-07-28 NOTE — OP NOTE
OPERATIVE REPORT  PATIENT NAME: Ana Banuelos    :  2000  MRN: [id number]  Pt Location: [facility] L[facility]D OR ROOM 01    SURGERY DATE: 2024    Surgeons and Role:     * Seema Last MD - Primary     * Gina Landa MD - Assisting    Preop Diagnosis:  39 weeks gestation of pregnancy  Rubella nonimmune  Anemia in pregnancy  Persistent category 2 tracing  Fetal VSD    PROCEDURES:  Primary Low Transverse  Section    Specimen(s):  ID Type Source Tests Collected by Time Destination   1 :  Tissue Placenta TISSUE EXAM Seema Last MD 2024 0142    A :  Cord Blood Cord BLOOD GAS, ARTERIAL, CORD Seema Last MD 2024 013    B :  Cord Blood Cord BLOOD GAS, VENOUS, CORD Seema Last MD 2024 013        Surgical QBL:  Surgical QBL (mL): 135 mL      Drains:  Urethral Catheter Non-latex 16 Fr. (Active)   Site Assessment Clean;Skin intact;Patent 24 1718   Output (mL) 400 mL 24 1718   Number of days: 1       Anesthesia Type:   Epidural    Operative Indications:  Persistent Category II FHT  39 weeks gestation of pregnancy     Samir Group Classification System:  No Multiple pregnancy, No Transverse or oblique lie, No Breech lie, Gestational age is > or =37 weeks, Nulliparous, Labor induced +  is SAMIR GROUP 2a    Complications:   None    Procedure and Technique:  Brief History  Ana Banuelos is now a 23 y.o.  at 40w3d admitted for elective induction of labor.  Pregnancy was notable for anemia, rubella nonimmune, and a fetal VSD.  On exam patient was noted to be 1/50/-3 and a Holder balloon was placed.  Patient's membranes were ruptured upon placement, and Pitocin was started.  Patient received her epidural for analgesia.  Holder balloon came out and patient was noted to be 3.5/70/-3.  Recurrent variable decelerations were noted and Pitocin was turned off.  Pitocin was later turned back on at the lowest dose and FHT again showed recurrent lates and variables  and an IUPC was placed and amnioinfusion started.  Despite resuscitation efforts decelerations were noted again and Pitocin was turned off.  Patient made change to 5/80/-2 and was again started on Pitocin.  Recurrent late decelerations were noted and Pitocin was then turned off for the third time.  While not on Pitocin, FHT significant for prolonged deceleration with return to baseline following repositioning.  At this time the decision was made to proceed to the OR for primary low-transverse  section due to persistent category 2 tracing and fetal intolerance to labor.    Operative Indications:  Primary low transverse  section for Persistent category II tracing    Attending Surgeon: Dr. Last  Resident Surgeon: Dr. Gina Landa    Operative Findings:  1. Viable female  on 2024 at 0137 with APGARs of 9 and 9 at 1 and 5 minutes. Fetus weighted 7lb 5.5oz.  2. Clear amniotic fluid  3. Normal intact placenta with centrally inserted 3VC.  4. Normal uterus, bilateral tubes and ovaries  5. Adhesions absent    QBL: 135mL    Umbilical Cord Venous Blood Gas:  Results from last 7 days   Lab Units 24  0131   PH COV  7.345   PCO2 COV mm HG 36.4   HCO3 COV mmol/L 19.4   BASE EXC COV mmol/L -5.4*   O2 CT CD VB mL/dL 18.8   O2 HGB, VENOUS CORD % 80.0     Umbilical Cord Arterial Blood Gas:  Results from last 7 days   Lab Units 24  0131   PH COA  7.324   PCO2 COA  40.2   PO2 COA mm HG 17.8   HCO3 COA mmol/L 20.4   BASE EXC COA mmol/L -5.2*   O2 CONTENT CORD ART ml/dl 8.9   O2 HGB, ARTERIAL CORD % 37.5       Operative Technique  The patient was taken to the operating room. A bolus of epidural anesthesia was adequately established and Ancef and Azithromycin were given for preoperative prophylaxis. The patient was then placed in the dorsal supine position with a left tilt of the hips. The patient was then prepped with chlorhexidine for vaginal prep and chloraprep for abdominal prep and draped in  the usual sterile fashion for a Pfannenstiel skin incision.      A time out was performed to confirm correct patient and correct procedure.     A Pfannenstiel incision was made and carried down through the underlying subcutaneous tissue to the fascia using a scalpel. Rectus fascia was then incised and extended bilaterally using the curved Banegas scissors. The superior edge of the fascial incision was grasped with Kocher clamps, tented up and the underlying rectus muscles were dissected off bluntly and sharply using the scissors. The same was done inferiorly. The rectus muscles were  and the peritoneum was identified, entered, and extended longitudinally with blunt dissection.     The bladder blade was inserted and a transverse incision was made in the lower uterine segment using a new surgical blade. The uterine incision was extended cephalad and caudal using blunt dissection. The amniotic sac was entered.    The surgeon's hand was placed into the uterine cavity. The fetal head was identified and elevated through the uterine incision with the assistance of fundal pressure. With gentle traction, the shoulder was delivered, followed by the rest of the fetal body. There was no nuchal cord noted. On delivery the cord was doubly clamped and cut after delayed cord clamping. The infant was then passed off the table to the awaiting  staff. The  was noted to cry spontaneously and moved all extremities. Venous and arterial blood gas, cord blood, and portion of cord was obtained for analysis and routine blood testing. The placenta delivery was then sent to storage. Placenta was noted to be intact with a centrally inserted three-vessel cord.  Oxytocin was administered by IV infusion to enhance uterine contraction. The uterus was exteriorized and cleared of all clots and remaining products of conception.      The uterine incision was re approximated using an 0 Vicryl in a running locked fashion. A second  horizontal imbricating stitch with the same suture was applied. The uterine incision was examined and noted to be hemostatic. The posterior cul-de-sac was cleared of all clots and products of conception. The uterus was replaced into the abdomen and the pericolic gutters were cleared of all clots.  The uterine incision was once again reexamined and noted to be hemostatic. The fascia was re approximated using an 0 Vicryl in a running nonlocked fashion. The skin incision was closed using 4-0 monocryl with steristrips.     Good hemostasis was noted. Patient tolerated the procedure well. All needle, sponge, and instrument counts were noted to be correct x 2 at the end of the procedure.  Patient was transferred to the recovery room in stable condition. Dr. Last was present and participated in the entire surgery.    Patient Disposition:  Postpartum room        SIGNATURE: Gina Landa MD  DATE: July 28, 2024  TIME: 2:22 AM

## 2024-07-28 NOTE — PLAN OF CARE
Problem: POSTPARTUM  Goal: Experiences normal postpartum course  Description: INTERVENTIONS:  - Monitor maternal vital signs  - Assess uterine involution and lochia  Outcome: Progressing  Goal: Appropriate maternal -  bonding  Description: INTERVENTIONS:  - Identify family support  - Assess for appropriate maternal/infant bonding   -Encourage maternal/infant bonding opportunities  - Referral to  or  as needed  Outcome: Progressing  Goal: Establishment of infant feeding pattern  Description: INTERVENTIONS:  - Assess breast/bottle feeding  - Refer to lactation as needed  Outcome: Progressing  Goal: Incision(s), wounds(s) or drain site(s) healing without S/S of infection  Description: INTERVENTIONS  - Assess and document dressing, incision, wound bed, drain sites and surrounding tissue  - Provide patient and family education  - Perform skin care/dressing changes every

## 2024-07-28 NOTE — OB LABOR/OXYTOCIN SAFETY PROGRESS
Labor Progress Note - Ana Banuelos 23 y.o. female MRN: [id number]    Unit/Bed#: -01 Encounter: [id number]    Dose (niki-units/min) Oxytocin: 0 niki-units/min  Contraction Frequency (minutes): 3-3.5  Contraction Intensity: Mild/Moderate  Uterine Activity Characteristics: Regular  Cervical Dilation: 5-6        Cervical Effacement: 80  Fetal Station: -2  Baseline Rate (FHR): 125 bpm  Fetal Heart Rate (FHT): 130 BPM  FHR Category: 2               Vital Signs:   Vitals:    24 2358   BP: 124/75   Pulse: 94   Resp:    Temp:    SpO2:        Notes/comments:   SVE as above.   Prolonged deceleration noted with oriana in the 80s for 3 minutes with brief return to baseline and deceleration again noted to 90s for additionaly 2 minutes. Discussed options with patient and given that this is the 3rd time we have paused pitocin due to fetal intolerance with minimal change, we recommend proceeding with  section. Patient counseled on risks including, bleeding, infection, damage to surrounding structures including bowel, bladder, ureters, possible abnormal placentation and need for future  section. Patient is amenable, will proceed to OR. Dr. Last also in room.    Gina Landa MD 2024 12:37 AM

## 2024-07-28 NOTE — DISCHARGE SUMMARY
Obstetrics Discharge Summary  Ana Banuelos 23 y.o. female MRN: [id number]  Unit/Bed#: LD OR  Encounter: [id number]    Admission Date: 2024     Discharge Date: 24     Admitting Attending: Dr. Last  Delivery Attending: Dr. Last  Discharging Attending:  Dr. Link    Admitting Diagnoses:   39 weeks gestation of pregnancy  Encounter for elective induction of labor  Rubella nonimmune  Anemia in pregnancy  Persistent category 2 tracing  Fetal VSD    Discharge Diagnoses:   Same, delivered    Procedures: 1LTCS (Category II tracing)    Anesthesia: epidural    Hospital course: Ana Banuelos is now a 23 y.o.  at 40w3d admitted for elective induction of labor.  Pregnancy was notable for anemia, rubella nonimmune, and a fetal VSD.  On exam patient was noted to be 1/50/-3 and a Holder balloon was placed.  Patient's membranes were ruptured upon placement, and Pitocin was started.  Patient received her epidural for analgesia.  Holder balloon came out and patient was noted to be 3.5/70/-3.  Recurrent variable decelerations were noted and Pitocin was turned off.  Pitocin was later turned back on at the lowest dose and FHT again showed recurrent lates and variables and an IUPC was placed and amnioinfusion started.  Despite resuscitation efforts decelerations were noted again and Pitocin was turned off.  Patient made change to 5/80/-2 and was again started on Pitocin.  Recurrent late decelerations were noted and Pitocin was then turned off for the third time.  While not on Pitocin, FHT significant for prolonged deceleration with return to baseline following repositioning.  At this time the decision was made to proceed to the OR for primary low-transverse  section due to persistent category 2 tracing and fetal intolerance to labor.     Delivery Findings:  She underwent an uncomplicated primary low transverse  section delivery on 2024 1:37 AM and delivered a viable female  at  0137. Placenta delivered without difficulty.   was admitted to the  nursery. Patient tolerated the procedure well and was transferred to recovery in stable condition.     Baby's Weight: 3330 g (7 lb 5.5 oz); 117.46    Apgar scores: 9  and 9  at 1 and 5 minutes, respectively  QBL: 135mL    The patient's post partum course was unremarkable.. Her preoperative hemoglobin was 13.2g/dL, postoperative was 11.5. Her postoperative pain was well controlled with oral analgesics.    Her postpartum pain was well controlled with oral analgesics. Maternal blood type is A negative, while baby's is A Positive, so RhoGAM was given on 24.     echo was recommended and pt agreed to schedule the appointment prior to discharge from the hospital.    On day of discharge, she was ambulating and able to reasonably perform all ADLs. She was voiding and had appropriate bowel function. Pain was well controlled. She was discharged home on postpartum day #2 without complications. Patient was instructed to follow up with her OBGYN as an outpatient and was given appropriate warnings to call provider if she develops signs of infection or uncontrolled pain.    Complications: none apparent    Condition at discharge: Good    Disposition: Home    Planned Readmission: No    Discharge Medications:   Please see AVS for a complete list of discharge medications.    Discharge instructions :   -Do not place anything (no partner, tampons or douche) in your vagina for 6 weeks  -You may walk for exercise for the first 6 weeks then gradually return to your usual activities   -Please do not drive for 1 week if you have no stitches and for 2 weeks if you have stitches    -You may take baths or shower per your preference   -Please examine your breasts in the mirror daily and call your doctor for redness or tenderness or increased warmth    -Please call your doctor's office if temperature > 100.4*F or 38* C, worsening pain or a foul  discharge.    Follow Up:  - Follow up in 3 weeks for postpartum visit or sooner if needed

## 2024-07-28 NOTE — OB LABOR/OXYTOCIN SAFETY PROGRESS
Oxytocin Safety Progress Check Note - Ana Banuelos 23 y.o. female MRN: [id number]    Unit/Bed#: -01 Encounter: [id number]    Dose (niki-units/min) Oxytocin: 22 niki-units/min  Contraction Frequency (minutes): 3-4  Contraction Intensity: Mild/Moderate  Uterine Activity Characteristics: Regular  Cervical Dilation: 5        Cervical Effacement: 80  Fetal Station: -2  Baseline Rate (FHR): 140 bpm  Fetal Heart Rate (FHT): 130 BPM  FHR Category: 2               Vital Signs:   Vitals:    07/27/24 1843   BP: 100/55   Pulse: 79   Resp:    Temp:    SpO2:        Notes/comments:   SVE as above. Variable and late decelerations noted. Repositioning, continue amnioinfusion. Pitocin decreased to 12 without improvement, now turned off. D/w Dr. Shala Landa MD 7/27/2024 9:46 PM

## 2024-07-29 ENCOUNTER — TELEPHONE (OUTPATIENT)
Dept: OBGYN CLINIC | Facility: CLINIC | Age: 24
End: 2024-07-29

## 2024-07-29 PROCEDURE — 99024 POSTOP FOLLOW-UP VISIT: CPT | Performed by: OBSTETRICS & GYNECOLOGY

## 2024-07-29 RX ADMIN — KETOROLAC TROMETHAMINE 15 MG: 30 INJECTION, SOLUTION INTRAMUSCULAR; INTRAVENOUS at 04:16

## 2024-07-29 RX ADMIN — IBUPROFEN 600 MG: 600 TABLET, FILM COATED ORAL at 12:15

## 2024-07-29 RX ADMIN — ACETAMINOPHEN 650 MG: 325 TABLET, FILM COATED ORAL at 02:24

## 2024-07-29 RX ADMIN — IBUPROFEN 600 MG: 600 TABLET, FILM COATED ORAL at 21:29

## 2024-07-29 RX ADMIN — ACETAMINOPHEN 650 MG: 325 TABLET, FILM COATED ORAL at 09:25

## 2024-07-29 RX ADMIN — ACETAMINOPHEN 650 MG: 325 TABLET, FILM COATED ORAL at 16:09

## 2024-07-29 RX ADMIN — POLYETHYLENE GLYCOL 3350 17 G: 17 POWDER, FOR SOLUTION ORAL at 09:25

## 2024-07-29 NOTE — LACTATION NOTE
This note was copied from a baby's chart.  CONSULT - LACTATION  Baby Girl (Kaila Banuelos 1 days female MRN: [id number]    [facility] NURSERY Room / Bed:  414(N)/ 414(N) Encounter: [id number]    Maternal Information     MOTHER:  Ana Banuelos  Maternal Age: 23 y.o.  OB History: # 1 - Date: 23, Sex: None, Weight: None, GA: None, Type: Biochemical, Apgar1: None, Apgar5: None, Living: None, Birth Comments: None    # 2 - Date: 24, Sex: Female, Weight: 3330 g (7 lb 5.5 oz), GA: 40w3d, Type: , Low Transverse, Apgar1: 9, Apgar5: 9, Living: Living, Birth Comments: None   Previouse breast reduction surgery? No    Lactation history:   Has patient previously breast fed: No   How long had patient previously breast fed:     Previous breast feeding complications:       Past Surgical History:   Procedure Laterality Date    [place]  DELIVERY ONLY N/A 2024    Procedure:  SECTION ();  Surgeon: Seema Last MD;  [facility]: [facility];  Service: Obstetrics       Birth information:  YOB: 2026   Time of birth: 1:37 AM   Sex: female   Delivery type: , Low Transverse   Birth Weight: 3330 g (7 lb 5.5 oz)   Percent of Weight Change: -4%     Gestational Age: 40w3d   [unfilled]    Assessment     Breast and nipple assessment: normal assessment    [place] Assessment: sleepy    Feeding assessment: feeding well  LATCH:  Latch: Repeated attempts, hold nipple in mouth, stimulate to suck (Did better over time.)   Audible Swallowing: A few with stimulation   Type of Nipple: Everted (After stimulation)   Comfort (Breast/Nipple): Soft/non-tender   Hold (Positioning): Partial assist, teach one side, mother does other, staff holds   LATCH Score: 7          Feeding recommendations:  breast feed on demand    Met with Family.  Provided  with Ready, Set, Baby booklet which contained information on:  Hand expression with access to QR codes to  review hand expression.  Positioning and latch reviewed as well as showing images of other feeding positions.  Discussed the properties of a good latch in any position.   Feeding on cue and what that means for recognizing infant's hunger, s/s that baby is getting enough milk and some s/s that breastfeeding dyad may need further help  Skin to Skin contact and benefits to mom and baby  Avoidance of pacifiers for the first month discussed.   Gave information on common concerns, what to expect the first few weeks after delivery, preparing for other caregivers, and how partners can help. Resources for support also provided.    Education on positioning and alignment.   Mom is encouraged to:     - Bring baby up to the breast (use of pillows to elevate so baby's torso is against mom's breasts)   - Skin to skin for feedings with top hand exposed to show signs of satiation   - Chin deep into breast tissue (make baby look up to the nipple)   - nose aligned to the nipple   -Wait for wide gape, drag chin on the breast so nipple is aimed at the upper, back palate  - Cheek should be touching breast   - Deep, firm hold of baby with ear, shoulder, hip alignment    Mom chose to start on right breast using football hold. With permission, Worked on positioning infant up at chest level and starting to feed infant with nose arriving at the nipple. Then, using areolar compression to achieve a deep latch that is comfortable and exchanges optimum amounts of milk. Guided dyad to deep latch. Stimulated to suck converting to rocker suckling. Mom noted less discomfort, better suckling and relaxed tone after feeding. Bsaby burped. Placed sat left breast also usinbg football hold. Mom able to maintain feed.     Also reviewed  discharge breastfeeding booklet including the feeding log. Emphasized 8 or more (12) feedings in a 24 hour period, what to expect for the number of diapers per day of life and the progression of properties of the   stooling pattern.    List of reasons to call a lactation consultant.  Feeding logs  Feeding cues  Hand expression  Baby's Second day (cluster feeding)  Breastfeeding and Your Lifestyle (Medications, Alcohol, Caffeine, Smoking, Street Drugs, Methadone)  First Two Weeks Survival Guide for Breastfeeding  Breast Changes  Physical Therapy  Storage and Handling of Breast milk  How to Keep Your Breast Pump Kit Clean  The Employed Breastfeeding Mother  Mixed feeding  Bottle feeding like breastfeeding (paced bottle feeding)  astfeeding and your lifestyle, storage and preparation of breast milk, how to keep you breast pump clean, the employed breastfeeding mother and paced bottle feeding handouts.     Booklet included Breastfeeding Resources for after discharge including access to the number for the [facility].      Encouraged parents to call for assistance, questions, and concerns about breastfeeding.  Extension provided.      Laura Ivy RN 7/29/2024 2:37 PM

## 2024-07-29 NOTE — PLAN OF CARE
Problem: POSTPARTUM  Goal: Experiences normal postpartum course  Description: INTERVENTIONS:  - Monitor maternal vital signs  - Assess uterine involution and lochia  Outcome: Progressing  Goal: Appropriate maternal -  bonding  Description: INTERVENTIONS:  - Identify family support  - Assess for appropriate maternal/infant bonding   -Encourage maternal/infant bonding opportunities  - Referral to  or  as needed  Outcome: Progressing  Goal: Establishment of infant feeding pattern  Description: INTERVENTIONS:  - Assess breast/bottle feeding  - Refer to lactation as needed  Outcome: Progressing  Goal: Incision(s), wounds(s) or drain site(s) healing without S/S of infection  Description: INTERVENTIONS  - Assess and document dressing, incision, wound bed, drain sites and surrounding tissue  - Provide patient and family education  - Perform skin care/dressing changes every day  Outcome: Progressing

## 2024-07-29 NOTE — PROGRESS NOTES
Progress Note - OB/GYN   Aan Banuelos 23 y.o. female MRN: [id number]  Unit/Bed#: -01 Encounter: [id number]      Assessment/Plan    Ana Banuelos is a 23 y.o.  who is POD 1 s/p 1LTCS at 40w3d     Fetal ventricular septal defect affecting antepartum care of mother  Assessment & Plan  Fetal echo 24:  -There is a muscular VSD with otherwise normal cardiac anatomy.   -Normal prenatal care, delivery, and  care are recommended.   -Recommend  outpatient cardiology consult with echocardiogram at 1-2 weeks of life (Please schedule appointment prior to hospital discharge. Phone [phone]).    Rubella non-immune status, antepartum  Assessment & Plan  Patient amenable to receiving MMR vaccine, ordered    Rh negative state in antepartum period  Assessment & Plan  S/p rhogam injection at 28 weeks  Rhogam panel ordered    * Status post primary low transverse  section  Assessment & Plan  QBL: 135cc; Hemoglobin 13.2g/dL --> 11.5 g/dL  Pain regimen: s/p Duramorph; esequiel Tylenol, esequiel Toradol->Motrin  Continue bowel regimen  VTE prophylaxis: SCDs (BMI 27)  Voiding spontaneously, s/p ruiz  Encourage ambulation and breastfeeding/pumping           Disposition: Anticipate discharge home postpartum Day #2-4  Barriers to discharge: none       Subjective/Objective     Subjective: Postpartum state    Pain: no  Tolerating PO: yes  Voiding: yes  Flatus: yes  BM: no  Ambulating: yes  Breastfeeding: Breastfeeding  Chest pain: no  Shortness of breath: no  Leg pain: no  Lochia: wnl    Objective:     Vitals:  Vitals:    24 1500 24 2039 24 2340 24 0416   BP: 118/73 126/78 120/64 129/73   BP Location: Right arm Right arm Right arm Right arm   Pulse: 92 103 102 (!) 109   Resp: 18 16 17 20   Temp: 99 °F (37.2 °C) 98.8 °F (37.1 °C) 98.8 °F (37.1 °C) 97.8 °F (36.6 °C)   TempSrc: Temporal Oral Oral Oral   SpO2: 96% 98% 99% 95%   Weight:       Height:           Physical Exam:   GEN:  appears well, alert and oriented x 3, pleasant and cooperative   CV: Regular rate  RESP: non labored breathing  ABDOMEN: soft, no tenderness, no distention, Uterine fundus firm and non-tender, -1 cm below the umbilicus, dressing over incision c/d/I no strikethrough  EXTREMITIES: non-tender  NEURO Alert and oriented to person, place, and time.       Lab Results   Component Value Date    WBC 15.42 (H) 07/28/2024    HGB 11.5 07/28/2024    HCT 34.8 07/28/2024    MCV 89 07/28/2024     07/28/2024         Sandra Marley MD  Obstetrics & Gynecology PGY-1  07/29/24

## 2024-07-30 ENCOUNTER — LACTATION ENCOUNTER (OUTPATIENT)
Dept: NURSERY | Facility: HOSPITAL | Age: 24
End: 2024-07-30

## 2024-07-30 VITALS
OXYGEN SATURATION: 98 % | BODY MASS INDEX: 27.31 KG/M2 | HEIGHT: 64 IN | HEART RATE: 74 BPM | TEMPERATURE: 98 F | WEIGHT: 160 LBS | DIASTOLIC BLOOD PRESSURE: 56 MMHG | RESPIRATION RATE: 18 BRPM | SYSTOLIC BLOOD PRESSURE: 114 MMHG

## 2024-07-30 PROBLEM — Z34.83 ENCOUNTER FOR SUPERVISION OF OTHER NORMAL PREGNANCY, THIRD TRIMESTER: Status: RESOLVED | Noted: 2023-12-18 | Resolved: 2024-07-30

## 2024-07-30 PROCEDURE — 99024 POSTOP FOLLOW-UP VISIT: CPT | Performed by: OBSTETRICS & GYNECOLOGY

## 2024-07-30 PROCEDURE — 90707 MMR VACCINE SC: CPT

## 2024-07-30 PROCEDURE — NC001 PR NO CHARGE: Performed by: OBSTETRICS & GYNECOLOGY

## 2024-07-30 RX ORDER — IBUPROFEN 600 MG/1
600 TABLET ORAL EVERY 6 HOURS
Qty: 30 TABLET | Refills: 0 | Status: SHIPPED | OUTPATIENT
Start: 2024-07-30

## 2024-07-30 RX ORDER — ACETAMINOPHEN 325 MG/1
650 TABLET ORAL EVERY 6 HOURS PRN
Start: 2024-07-30

## 2024-07-30 RX ORDER — SIMETHICONE 80 MG
80 TABLET,CHEWABLE ORAL 4 TIMES DAILY PRN
Start: 2024-07-30

## 2024-07-30 RX ORDER — CALCIUM CARBONATE 500 MG/1
1000 TABLET, CHEWABLE ORAL DAILY PRN
Start: 2024-07-30

## 2024-07-30 RX ORDER — MAGNESIUM HYDROXIDE/ALUMINUM HYDROXICE/SIMETHICONE 120; 1200; 1200 MG/30ML; MG/30ML; MG/30ML
15 SUSPENSION ORAL EVERY 6 HOURS PRN
Start: 2024-07-30

## 2024-07-30 RX ORDER — DIPHENHYDRAMINE HCL 25 MG
25 TABLET ORAL EVERY 6 HOURS PRN
Status: CANCELLED
Start: 2024-07-30

## 2024-07-30 RX ORDER — POLYETHYLENE GLYCOL 3350 17 G/17G
17 POWDER, FOR SOLUTION ORAL DAILY
Start: 2024-07-30

## 2024-07-30 RX ORDER — BENZOCAINE/MENTHOL 6 MG-10 MG
1 LOZENGE MUCOUS MEMBRANE DAILY PRN
Status: CANCELLED
Start: 2024-07-30

## 2024-07-30 RX ADMIN — ACETAMINOPHEN 650 MG: 325 TABLET, FILM COATED ORAL at 05:36

## 2024-07-30 RX ADMIN — POLYETHYLENE GLYCOL 3350 17 G: 17 POWDER, FOR SOLUTION ORAL at 08:03

## 2024-07-30 RX ADMIN — ACETAMINOPHEN 650 MG: 325 TABLET, FILM COATED ORAL at 11:09

## 2024-07-30 RX ADMIN — MEASLES, MUMPS, AND RUBELLA VIRUS VACCINE LIVE 0.5 ML: 1000; 12500; 1000 INJECTION, POWDER, LYOPHILIZED, FOR SUSPENSION SUBCUTANEOUS at 08:08

## 2024-07-30 RX ADMIN — IBUPROFEN 600 MG: 600 TABLET, FILM COATED ORAL at 11:09

## 2024-07-30 RX ADMIN — IBUPROFEN 600 MG: 600 TABLET, FILM COATED ORAL at 05:36

## 2024-07-30 NOTE — NURSING NOTE
Discharge teaching reviewed with patient and FOB.  Save your life magnet given and explained to patient.  Questions encouraged and answered to their satisfaction.

## 2024-07-30 NOTE — PROGRESS NOTES
Progress Note - OB/GYN   Ana Banuelos 23 y.o. female MRN: [id number]  Unit/Bed#: -01 Encounter: [id number]      Assessment/Plan    Ana Banuelos is a 23 y.o.  who is POD 2 s/p 1LTCS at 40w3d     Fetal ventricular septal defect affecting antepartum care of mother  Assessment & Plan  Fetal echo 24:  -There is a muscular VSD with otherwise normal cardiac anatomy.   -Normal prenatal care, delivery, and  care are recommended.   -Recommend  outpatient cardiology consult with echocardiogram at 1-2 weeks of life (Please schedule appointment prior to hospital discharge. Phone [phone]).    Rubella non-immune status, antepartum  Assessment & Plan  Patient amenable to receiving MMR vaccine, ordered    Rh negative state in antepartum period  Assessment & Plan  S/p rhogam injection at 28 weeks  Rhogam panel ordered - given.    * Status post primary low transverse  section  Assessment & Plan  QBL: 135cc; Hemoglobin 13.2g/dL --> 11.5 g/dL  Pain regimen: s/p Duramorph; esequiel Tylenol, esequiel Toradol->Motrin  Continue bowel regimen  VTE prophylaxis: SCDs (BMI 27)  Voiding spontaneously, s/p ruiz  Encourage ambulation and breastfeeding/pumping  Contraception: condoms           Disposition: Anticipate discharge home postpartum Day #2-4  Barriers to discharge: none       Subjective/Objective     Subjective: Postpartum state. She has the number for scheduling the  outpatient echo and plans to do so.     Pain: yes, along incision   Tolerating PO: yes  Voiding: yes  Flatus: yes  BM: yes  Ambulating: yes  Breastfeeding: Breastfeeding  Chest pain: no  Shortness of breath: no  Leg pain: no  Lochia: wnl    Objective:     Vitals:  Vitals:    24 1100 24 1500 24 1900 24 2300   BP: 113/77 111/81 118/70 120/76   BP Location: Right arm Left arm Right arm Right arm   Pulse: 85 84 86 82   Resp: 18 16 16 16   Temp: 97.8 °F (36.6 °C) 98.1 °F (36.7 °C) 98.2 °F (36.8 °C) 98.8 °F  (37.1 °C)   TempSrc: Oral Oral Temporal Temporal   SpO2: 96% 96% 96% 97%   Weight:       Height:           Physical Exam:   GEN: appears well, alert and oriented x 3, pleasant and cooperative   CV: Regular rate  RESP: non labored breathing  ABDOMEN: soft, no tenderness, no distention, Uterine fundus firm and non-tender, at the umbilicus, steri strips covering incision, incision c/d/i  EXTREMITIES: non-tender  NEURO Alert and oriented to person, place, and time.       Lab Results   Component Value Date    WBC 15.42 (H) 07/28/2024    HGB 11.5 07/28/2024    HCT 34.8 07/28/2024    MCV 89 07/28/2024     07/28/2024         Sandra Marley MD  Obstetrics & Gynecology PGY-1  07/30/24

## 2024-07-30 NOTE — LACTATION NOTE
This note was copied from a baby's chart.    In to see family prior to discharge Home day. Mom states nursing has been going much better and has received help from nurses and even doctors. Staff reports Mom C/O sore nipples.  Information given about sore nipples and how to correct with positioning techniques. Discussed maneuvers to latch infant on properly to avoid nipple pain and promote healing.  Discussed treatments that could be utilized to promote healing. Hydro gel dressings given with instruction and verbalization of understanding of cleaning and duration of use.     07/30/24 1015   Maternal Information   Has mother  before? No   Infant to breast within first hour of birth? Yes   Exclusive Pump and Bottle Feed No   LATCH Documentation   Having latch problems? No  (Mom states nursing is going better; documented latch score =7; encouraged lactation support)   Breasts/Nipples   Intervention Other (comment);Lanolin;Hydrogel pads  (review good latch/feed and Sore Nipples Handout)   Breastfeeding Status Yes   Breastfeeding Progress Not yet established   Breast Pump   Pump 3  (has [employer] breast pump from Insurance)   Patient Follow-Up   Lactation Consult Status 2   Follow-Up Type Inpatient;Call as needed   Other OB Lactation Documentation    Additional Problem Noted Encouraged Lactation support  (LER - Painful Nipples)       Encouraged parents to call for assistance, questions, and concerns about breastfeeding.  Extension provided.

## 2024-07-31 ENCOUNTER — POSTPARTUM VISIT (OUTPATIENT)
Dept: OBGYN CLINIC | Facility: CLINIC | Age: 24
End: 2024-07-31
Payer: COMMERCIAL

## 2024-07-31 ENCOUNTER — NURSE TRIAGE (OUTPATIENT)
Age: 24
End: 2024-07-31

## 2024-07-31 VITALS
DIASTOLIC BLOOD PRESSURE: 82 MMHG | WEIGHT: 161 LBS | BODY MASS INDEX: 27.49 KG/M2 | SYSTOLIC BLOOD PRESSURE: 120 MMHG | HEIGHT: 64 IN

## 2024-07-31 PROBLEM — Z34.90 ENCOUNTER FOR ELECTIVE INDUCTION OF LABOR: Status: RESOLVED | Noted: 2024-07-27 | Resolved: 2024-07-31

## 2024-07-31 PROBLEM — Z28.39 RUBELLA NON-IMMUNE STATUS, ANTEPARTUM: Status: RESOLVED | Noted: 2024-01-15 | Resolved: 2024-07-31

## 2024-07-31 PROBLEM — O26.899 RH NEGATIVE STATE IN ANTEPARTUM PERIOD: Status: RESOLVED | Noted: 2024-01-15 | Resolved: 2024-07-31

## 2024-07-31 PROBLEM — Z98.891 STATUS POST PRIMARY LOW TRANSVERSE CESAREAN SECTION: Chronic | Status: RESOLVED | Noted: 2023-12-18 | Resolved: 2024-07-31

## 2024-07-31 PROBLEM — O35.BXX0 FETAL VENTRICULAR SEPTAL DEFECT AFFECTING ANTEPARTUM CARE OF MOTHER: Status: RESOLVED | Noted: 2024-03-14 | Resolved: 2024-07-31

## 2024-07-31 PROBLEM — O09.899 RUBELLA NON-IMMUNE STATUS, ANTEPARTUM: Status: RESOLVED | Noted: 2024-01-15 | Resolved: 2024-07-31

## 2024-07-31 PROBLEM — O99.013 ANEMIA OF PREGNANCY IN THIRD TRIMESTER: Status: RESOLVED | Noted: 2024-05-23 | Resolved: 2024-07-31

## 2024-07-31 PROBLEM — Z67.91 RH NEGATIVE STATE IN ANTEPARTUM PERIOD: Status: RESOLVED | Noted: 2024-01-15 | Resolved: 2024-07-31

## 2024-07-31 PROCEDURE — 88307 TISSUE EXAM BY PATHOLOGIST: CPT | Performed by: PATHOLOGY

## 2024-07-31 PROCEDURE — 0503F POSTPARTUM CARE VISIT: CPT | Performed by: OBSTETRICS & GYNECOLOGY

## 2024-07-31 PROCEDURE — 99213 OFFICE O/P EST LOW 20 MIN: CPT | Performed by: OBSTETRICS & GYNECOLOGY

## 2024-07-31 NOTE — ASSESSMENT & PLAN NOTE
At least some of stress seems centered around difficulties with breast feeding - reviewed need for frequent pumping if she is not nursing in order to maximize milk supply. Baby largely feeding by formula currently. She is scheduled to see her pediatrician tomorrow to review baby's weight gain and feeding plans.   Will refer to behavioral health/baby and me, messaged staff to expedite follow up   Precautions reviewed. Will plan f/u in 2 weeks

## 2024-07-31 NOTE — TELEPHONE ENCOUNTER
"Patient called in stating that she's having symptoms of PP depression, pt delivered on  24 via . Patient called in stating that she's feeling overwhelmed and crying non stop. Pt reporting the babys cry makes her overwhelmed and the lack of sleep. Pt reporting she has difficulty sleeping, and decreased energy, feelings of sadness and guilt, hopelessness and worthlessness, possible problems caring for her baby. Pt reporting having difficulty doing everyday activities. Pt reporting her  and baby is home with her now and this is who the patient lives with. Pt reporting having friends and family who she can talk to. Pt reporting the birth of the baby. Pt reporting she planned to have a vaginal delivery and pt reporting having a . Pt denies the thoughts of harming herself or the baby.     Epic Secure Chat sent to Dr. Last       Patient is scheduled as per protocol to be seen in the office today, as per Dr. Last - OK to schedule for 345pm.           Reason for Disposition   Symptoms interfere with work, school, or care of baby    Answer Assessment - Initial Assessment Questions  1. CONCERN: \"What happened that made you call today?\"        Patient called in stating that she's feeling overwhelmed and crying non stop. Pt reporting the babys cry makes her overwhelmed and the lack of sleep.         2. DEPRESSION SYMPTOM SCREENING: \"How are you feeling overall?\" (e.g., decreased energy, increased sleeping or difficulty sleeping, difficulty concentrating, feelings of sadness, guilt, hopelessness, or worthlessness; problems caring for baby)        Pt reporting she has difficulty sleeping, and decreased energy, feelings of sadness and guilt, hopelessness and worthlessness, possible problems caring for her baby       3. RISK OF HARM - SUICIDAL IDEATION:  \"Do you ever have thoughts of hurting yourself or the baby?\"  (e.g., yes, no; details).    - INTENT:  \"Do you have thoughts of hurting yourself " "or the baby right NOW?\" (e.g., yes, no, N/A)    - PLAN: \"Do you have a specific plan for how you would do this?\" (e.g., gun, knife, overdose, no plan, N/A)        Pt denies     4. RISK OF HARM - HOMICIDAL IDEATION:  \"Do you ever have thoughts of hurting or killing someone else?\"  (e.g., yes, no, no but preoccupation with thoughts about death)    - INTENT:  \"Do you have thoughts of hurting or killing someone right NOW?\" (e.g., yes, no, N/A)    - PLAN: \"Do you have a specific plan for how you would do this?\" (e.g., gun, knife, no plan, N/A)       Pt denies     5. FUNCTIONAL IMPAIRMENT: \"How have things been going for you overall? Have you had more difficulty than usual doing your normal daily activities?\"  (e.g., better, same, worse; self-care, school, work, interactions)        Pt reporting having difficulty doing everyday activities         6. SUPPORT: \"Who is with you now?\" \"Who do you live with?\" \"Do you have family or friends who you can talk to?\"   Pt reporting her  and baby is home with her now and this is who the patient lives with. Pt reporting having friends and family who she can talk to.           7. THERAPIST: \"Do you have a counselor or therapist? Name?\"        Pt denies - pt requesting one possibly         8. STRESSORS: \"Has there been any new stress or recent changes in your life?\"        Pt reporting the birth of the baby. Pt reporting she planned to have a vaginal delivery and pt reporting having a            9. ALCOHOL USE OR SUBSTANCE USE (DRUG USE): \"Do you drink alcohol or use any illegal drugs?\"       Pt denies   10. OTHER: \"Do you have any other physical symptoms right now?\" (e.g., fever)          Pt denies         11. DELIVERY DATE: \"When was your delivery date?\"        24    Protocols used: Postpartum - Depression-ADULT-[place]    "

## 2024-07-31 NOTE — PROGRESS NOTES
Patient ID: Ana Banuelos is a 23 y.o. female.    Chief Complaint   Patient presents with    Postpartum Care     PPD- 16  Substance use- negative         She presents for postpartum visit, problem visit   She is now a  who is 3 days s/p primary C/S on 24 - IOL at 40+ weeks, persistent category 2 FHT remote from delivery   Anesthesia: epidural       Postpartum course was uncomplicated, she was discharged yesterday. Since d/c home she has had no complaints.   She denies abn bleeding, pelvic pain, breast complaints, bowel/bladder dysfunction.    Baby girl is feeding mostly by formula - latch has not been good. She has tried pumping twice since day of discharge    Pt called triage today reporting feeling overwhelmed, crying   has been home with her  Feels overwhelmed,  helping with nights and with infant care but pt feels unable to help due to fatigue, sadness  Denies any SI/HI   Her incisional pain is well controlled with motrin/tylenol alone         Gay  Depression Scale Total: 16  Plans for contraception: deferred           The following portions of the patient's history were reviewed and updated as appropriate: allergies, current medications, past family history, past medical history, past social history, past surgical history, and problem list.    Review of Systems   Constitutional:  Negative for appetite change, chills and fever.   Eyes:  Negative for visual disturbance.   Respiratory:  Negative for cough, chest tightness and shortness of breath.    Cardiovascular:  Negative for chest pain.   Gastrointestinal:  Negative for abdominal distention, abdominal pain, constipation, diarrhea, nausea and vomiting.   Endocrine: Negative for cold intolerance and heat intolerance.   Genitourinary:  Negative for difficulty urinating, dyspareunia, dysuria, frequency, genital sores, pelvic pain, urgency, vaginal bleeding, vaginal discharge and vaginal pain.   Musculoskeletal:   "Negative for arthralgias.   Neurological:  Negative for light-headedness and headaches.   Hematological:  Does not bruise/bleed easily.   Psychiatric/Behavioral:  Positive for dysphoric mood. Negative for behavioral problems.    All other systems reviewed and are negative.               Objective:  /82   Ht 5' 4\" (1.626 m)   Wt 73 kg (161 lb)   LMP 10/19/2023 (Exact Date)   Breastfeeding Yes   BMI 27.64 kg/m²     Physical Exam  Constitutional:       General: She is not in acute distress.     Appearance: Normal appearance.   HENT:      Head: Normocephalic and atraumatic.   Cardiovascular:      Rate and Rhythm: Normal rate.   Pulmonary:      Effort: Pulmonary effort is normal. No respiratory distress.   Abdominal:      General: A surgical scar is present. There is no distension.      Palpations: Abdomen is soft.      Tenderness: There is no abdominal tenderness. There is no guarding or rebound.      Comments: Incision C/D/I    Neurological:      General: No focal deficit present.      Mental Status: She is alert.   Psychiatric:         Mood and Affect: Mood normal.         Behavior: Behavior normal.   Vitals and nursing note reviewed.                   Postpartum Depression: High Risk (2024)    Maurice  Depression Scale     Last EPDS Total Score: 16     Last EPDS Self Harm Result: Never         Assessment/Plan:    Problem List Items Addressed This Visit       Postpartum depression - Primary     At least some of stress seems centered around difficulties with breast feeding - reviewed need for frequent pumping if she is not nursing in order to maximize milk supply. Baby largely feeding by formula currently. She is scheduled to see her pediatrician tomorrow to review baby's weight gain and feeding plans.   Will refer to behavioral health/baby and me, messaged staff to expedite follow up   Precautions reviewed. Will plan f/u in 2 weeks          Relevant Orders    Ambulatory referral to Psych " Services

## 2024-07-31 NOTE — PATIENT INSTRUCTIONS
[email or web address]    [facility] [address] [facility] [place] [phone] ([phone])    Breastfeeding resources/websites:   Sofía Espinal

## 2024-08-05 ENCOUNTER — NURSE TRIAGE (OUTPATIENT)
Age: 24
End: 2024-08-05

## 2024-08-05 ENCOUNTER — DOCUMENTATION (OUTPATIENT)
Dept: OBGYN CLINIC | Facility: CLINIC | Age: 24
End: 2024-08-05

## 2024-08-05 NOTE — TELEPHONE ENCOUNTER
"Patient is post partum from 7/28/24, she was seen in office with DR. Last on 7/31 for post partum depression. She has an appointment on 8/14 with [facility]. She called today because she does not think she can wait for appointment. She would like to know if Dr. Last would prescribe her medication. She states her depression is getting worse. She has difficulty sleeping, hopelessness, sadness, guilt. She has intermittent feelings of suicide/hurting herself. States she does not have a plan, and does not feel suicidal at this moment. She does feel like she wants to harm or kill anyone else. She is finding it difficult to care for herself and the baby. Her  has been helping her and with her and she has not been alone. Advised her to go to ED immediately if she felt suicidal again.   ESC message to Dr. Last.   Response: \" Ok.. can prescribe Zoloft for now. But if she feels worse in next week go to ER .  It will not work right away. So if she feels worse call back about alternative medicine which could potentially work faster but may take a few days to get approved \"     Called patient back and gave advice per Dr. Last. Advised patient to call back and go to ED immediately if symptoms were worsening and if suicidal thoughts returned. She verbalized understanding.     Reason for Disposition   Depression and unable to do any of normal activities (e.g., self care, school, work; in comparison to baseline).    Answer Assessment - Initial Assessment Questions  1. CONCERN: \"What happened that made you call today?\"      Past few night have been having worse thoughts.   2. DEPRESSION SYMPTOM SCREENING: \"How are you feeling overall?\" (e.g., decreased energy, increased sleeping or difficulty sleeping, difficulty concentrating, feelings of sadness, guilt, hopelessness, or worthlessness)      Decreased energy, haplessness, sadness guilt.   3. RISK OF HARM - SUICIDAL IDEATION:  \"Do you ever have thoughts of " "hurting or killing yourself?\"  (e.g., yes, no, no but preoccupation with thoughts about death)    - INTENT:  \"Do you have thoughts of hurting or killing yourself right NOW?\" (e.g., yes, no, N/A) suicidal thoughts for self.  - No plan to hurt or kill her self now.  Feels come and go at random points of the day.      - PLAN: \"Do you have a specific plan for how you would do this?\" (e.g., gun, knife, overdose, no plan, N/A)      No   4. RISK OF HARM - HOMICIDAL IDEATION:  \"Do you ever have thoughts of hurting or killing someone else?\"  (e.g., yes, no, no but preoccupation with thoughts about death)    - INTENT:  \"Do you have thoughts of hurting or killing someone right NOW?\" (e.g., yes, no, N/A) No     - PLAN: \"Do you have a specific plan for how you would do this?\" (e.g., gun, knife, no plan, N/A)       No   5. FUNCTIONAL IMPAIRMENT: \"How have things been going for you overall? Have you had more difficulty than usual doing your normal daily activities?\"  (e.g., better, same, worse; self-care, school, work, interactions)      Yes - difficulty caring for herself and baby  6. SUPPORT: \"Who is with you now?\" \"Who do you live with?\" \"Do you have family or friends who you can talk to?\"       Lives with  and he's been supporting her.   7. THERAPIST: \"Do you have a counselor or therapist? Name?\"      No   8. STRESSORS: \"Has there been any new stress or recent changes in your life?\"      Recent pregnancy   9. ALCOHOL USE OR SUBSTANCE USE (DRUG USE): \"Do you drink alcohol or use any illegal drugs?\"      No   10. OTHER: \"Do you have any other physical symptoms right now?\" (e.g., fever)        Decreased energy   11. PREGNANCY: \"Is there any chance you are pregnant?\" \"When was your last menstrual period?\"        Delivered 7/28/24    Protocols used: Depression-ADULT-[place]    "

## 2024-08-06 ENCOUNTER — APPOINTMENT (INPATIENT)
Dept: CT IMAGING | Facility: HOSPITAL | Age: 24
DRG: 776 | End: 2024-08-06
Payer: COMMERCIAL

## 2024-08-06 ENCOUNTER — APPOINTMENT (INPATIENT)
Dept: MRI IMAGING | Facility: HOSPITAL | Age: 24
DRG: 776 | End: 2024-08-06
Payer: COMMERCIAL

## 2024-08-06 ENCOUNTER — TELEPHONE (OUTPATIENT)
Dept: PSYCHIATRY | Facility: CLINIC | Age: 24
End: 2024-08-06

## 2024-08-06 ENCOUNTER — HOSPITAL ENCOUNTER (INPATIENT)
Facility: HOSPITAL | Age: 24
LOS: 2 days | Discharge: HOME | DRG: 776 | End: 2024-08-08
Admitting: OBSTETRICS & GYNECOLOGY
Payer: COMMERCIAL

## 2024-08-06 DIAGNOSIS — R90.89 ABNORMAL FINDING ON MRI OF BRAIN: ICD-10-CM

## 2024-08-06 DIAGNOSIS — R56.9 NEW ONSET SEIZURE (HCC): ICD-10-CM

## 2024-08-06 DIAGNOSIS — G93.6 VASOGENIC BRAIN EDEMA (HCC): Primary | ICD-10-CM

## 2024-08-06 DIAGNOSIS — R56.9 SEIZURE-LIKE ACTIVITY (HCC): ICD-10-CM

## 2024-08-06 DIAGNOSIS — O15.9 ECLAMPSIA: ICD-10-CM

## 2024-08-06 DIAGNOSIS — G93.6 VASOGENIC EDEMA (HCC): ICD-10-CM

## 2024-08-06 PROBLEM — N17.9 AKI (ACUTE KIDNEY INJURY) (HCC): Status: ACTIVE | Noted: 2024-08-06

## 2024-08-06 LAB
ALBUMIN SERPL BCG-MCNC: 3.6 G/DL (ref 3.5–5)
ALBUMIN SERPL BCG-MCNC: 3.6 G/DL (ref 3.5–5)
ALBUMIN SERPL BCG-MCNC: 3.9 G/DL (ref 3.5–5)
ALP SERPL-CCNC: 64 U/L (ref 34–104)
ALP SERPL-CCNC: 68 U/L (ref 34–104)
ALP SERPL-CCNC: 68 U/L (ref 34–104)
ALT SERPL W P-5'-P-CCNC: 10 U/L (ref 7–52)
ALT SERPL W P-5'-P-CCNC: 11 U/L (ref 7–52)
ALT SERPL W P-5'-P-CCNC: 11 U/L (ref 7–52)
ANION GAP SERPL CALCULATED.3IONS-SCNC: 10 MMOL/L (ref 4–13)
ANION GAP SERPL CALCULATED.3IONS-SCNC: 14 MMOL/L (ref 4–13)
ANION GAP SERPL CALCULATED.3IONS-SCNC: 9 MMOL/L (ref 4–13)
AST SERPL W P-5'-P-CCNC: 11 U/L (ref 13–39)
AST SERPL W P-5'-P-CCNC: 12 U/L (ref 13–39)
AST SERPL W P-5'-P-CCNC: 18 U/L (ref 13–39)
ATRIAL RATE: 79 BPM
BACTERIA UR QL AUTO: ABNORMAL /HPF
BASOPHILS # BLD AUTO: 0.02 THOUSANDS/ÂΜL (ref 0–0.1)
BASOPHILS NFR BLD AUTO: 0 % (ref 0–1)
BILIRUB SERPL-MCNC: 0.69 MG/DL (ref 0.2–1)
BILIRUB SERPL-MCNC: 0.72 MG/DL (ref 0.2–1)
BILIRUB SERPL-MCNC: 0.84 MG/DL (ref 0.2–1)
BILIRUB UR QL STRIP: NEGATIVE
BUN SERPL-MCNC: 21 MG/DL (ref 5–25)
BUN SERPL-MCNC: 21 MG/DL (ref 5–25)
BUN SERPL-MCNC: 26 MG/DL (ref 5–25)
CALCIUM SERPL-MCNC: 8.7 MG/DL (ref 8.4–10.2)
CALCIUM SERPL-MCNC: 8.9 MG/DL (ref 8.4–10.2)
CALCIUM SERPL-MCNC: 9.3 MG/DL (ref 8.4–10.2)
CHLORIDE SERPL-SCNC: 102 MMOL/L (ref 96–108)
CHLORIDE SERPL-SCNC: 103 MMOL/L (ref 96–108)
CHLORIDE SERPL-SCNC: 104 MMOL/L (ref 96–108)
CLARITY UR: CLEAR
CO2 SERPL-SCNC: 21 MMOL/L (ref 21–32)
CO2 SERPL-SCNC: 25 MMOL/L (ref 21–32)
CO2 SERPL-SCNC: 26 MMOL/L (ref 21–32)
COLOR UR: COLORLESS
CREAT SERPL-MCNC: 1.21 MG/DL (ref 0.6–1.3)
CREAT SERPL-MCNC: 1.21 MG/DL (ref 0.6–1.3)
CREAT SERPL-MCNC: 1.34 MG/DL (ref 0.6–1.3)
EOSINOPHIL # BLD AUTO: 0.04 THOUSAND/ÂΜL (ref 0–0.61)
EOSINOPHIL NFR BLD AUTO: 1 % (ref 0–6)
ERYTHROCYTE [DISTWIDTH] IN BLOOD BY AUTOMATED COUNT: 19.4 % (ref 11.6–15.1)
ERYTHROCYTE [DISTWIDTH] IN BLOOD BY AUTOMATED COUNT: 19.5 % (ref 11.6–15.1)
ERYTHROCYTE [DISTWIDTH] IN BLOOD BY AUTOMATED COUNT: 19.7 % (ref 11.6–15.1)
GFR SERPL CREATININE-BSD FRML MDRD: 55 ML/MIN/1.73SQ M
GFR SERPL CREATININE-BSD FRML MDRD: 62 ML/MIN/1.73SQ M
GFR SERPL CREATININE-BSD FRML MDRD: 62 ML/MIN/1.73SQ M
GLUCOSE SERPL-MCNC: 103 MG/DL (ref 65–140)
GLUCOSE SERPL-MCNC: 119 MG/DL (ref 65–140)
GLUCOSE SERPL-MCNC: 122 MG/DL (ref 65–140)
GLUCOSE SERPL-MCNC: 131 MG/DL (ref 65–140)
GLUCOSE UR STRIP-MCNC: NEGATIVE MG/DL
HCT VFR BLD AUTO: 40.7 % (ref 34.8–46.1)
HCT VFR BLD AUTO: 40.7 % (ref 34.8–46.1)
HCT VFR BLD AUTO: 43.3 % (ref 34.8–46.1)
HGB BLD-MCNC: 13.4 G/DL (ref 11.5–15.4)
HGB BLD-MCNC: 13.7 G/DL (ref 11.5–15.4)
HGB BLD-MCNC: 14 G/DL (ref 11.5–15.4)
HGB UR QL STRIP.AUTO: ABNORMAL
IMM GRANULOCYTES # BLD AUTO: 0.06 THOUSAND/UL (ref 0–0.2)
IMM GRANULOCYTES NFR BLD AUTO: 1 % (ref 0–2)
KETONES UR STRIP-MCNC: NEGATIVE MG/DL
LDH SERPL-CCNC: 310 U/L (ref 140–271)
LEUKOCYTE ESTERASE UR QL STRIP: NEGATIVE
LYMPHOCYTES # BLD AUTO: 0.67 THOUSANDS/ÂΜL (ref 0.6–4.47)
LYMPHOCYTES NFR BLD AUTO: 8 % (ref 14–44)
MAGNESIUM SERPL-MCNC: 2.1 MG/DL (ref 1.9–2.7)
MAGNESIUM SERPL-MCNC: 4.8 MG/DL (ref 1.9–2.7)
MAGNESIUM SERPL-MCNC: 5.8 MG/DL (ref 1.9–2.7)
MCH RBC QN AUTO: 28.5 PG (ref 26.8–34.3)
MCH RBC QN AUTO: 28.8 PG (ref 26.8–34.3)
MCH RBC QN AUTO: 29.8 PG (ref 26.8–34.3)
MCHC RBC AUTO-ENTMCNC: 32.3 G/DL (ref 31.4–37.4)
MCHC RBC AUTO-ENTMCNC: 32.9 G/DL (ref 31.4–37.4)
MCHC RBC AUTO-ENTMCNC: 33.7 G/DL (ref 31.4–37.4)
MCV RBC AUTO: 88 FL (ref 82–98)
MCV RBC AUTO: 88 FL (ref 82–98)
MCV RBC AUTO: 89 FL (ref 82–98)
MONOCYTES # BLD AUTO: 0.25 THOUSAND/ÂΜL (ref 0.17–1.22)
MONOCYTES NFR BLD AUTO: 3 % (ref 4–12)
MUCOUS THREADS UR QL AUTO: ABNORMAL
NEUTROPHILS # BLD AUTO: 7.26 THOUSANDS/ÂΜL (ref 1.85–7.62)
NEUTS SEG NFR BLD AUTO: 87 % (ref 43–75)
NITRITE UR QL STRIP: NEGATIVE
NON-SQ EPI CELLS URNS QL MICRO: ABNORMAL /HPF
NRBC BLD AUTO-RTO: 0 /100 WBCS
P AXIS: 55 DEGREES
PH UR STRIP.AUTO: 7 [PH]
PLATELET # BLD AUTO: 260 THOUSANDS/UL (ref 149–390)
PLATELET # BLD AUTO: 261 THOUSANDS/UL (ref 149–390)
PLATELET # BLD AUTO: 272 THOUSANDS/UL (ref 149–390)
PMV BLD AUTO: 9.1 FL (ref 8.9–12.7)
PMV BLD AUTO: 9.2 FL (ref 8.9–12.7)
PMV BLD AUTO: 9.5 FL (ref 8.9–12.7)
POTASSIUM SERPL-SCNC: 3.7 MMOL/L (ref 3.5–5.3)
POTASSIUM SERPL-SCNC: 4.1 MMOL/L (ref 3.5–5.3)
POTASSIUM SERPL-SCNC: 5.3 MMOL/L (ref 3.5–5.3)
PR INTERVAL: 146 MS
PROT SERPL-MCNC: 6.7 G/DL (ref 6.4–8.4)
PROT SERPL-MCNC: 6.7 G/DL (ref 6.4–8.4)
PROT SERPL-MCNC: 7.3 G/DL (ref 6.4–8.4)
PROT UR STRIP-MCNC: ABNORMAL MG/DL
QRS AXIS: -8 DEGREES
QRSD INTERVAL: 76 MS
QT INTERVAL: 388 MS
QTC INTERVAL: 444 MS
RBC # BLD AUTO: 4.6 MILLION/UL (ref 3.81–5.12)
RBC # BLD AUTO: 4.65 MILLION/UL (ref 3.81–5.12)
RBC # BLD AUTO: 4.91 MILLION/UL (ref 3.81–5.12)
RBC #/AREA URNS AUTO: ABNORMAL /HPF
SODIUM SERPL-SCNC: 138 MMOL/L (ref 135–147)
SP GR UR STRIP.AUTO: 1.01 (ref 1–1.03)
T WAVE AXIS: 35 DEGREES
URATE SERPL-MCNC: 9.3 MG/DL (ref 2–7.5)
UROBILINOGEN UR STRIP-ACNC: <2 MG/DL
VENTRICULAR RATE: 79 BPM
WBC # BLD AUTO: 10.21 THOUSAND/UL (ref 4.31–10.16)
WBC # BLD AUTO: 8.3 THOUSAND/UL (ref 4.31–10.16)
WBC # BLD AUTO: 8.37 THOUSAND/UL (ref 4.31–10.16)
WBC #/AREA URNS AUTO: ABNORMAL /HPF

## 2024-08-06 PROCEDURE — 83735 ASSAY OF MAGNESIUM: CPT

## 2024-08-06 PROCEDURE — 99285 EMERGENCY DEPT VISIT HI MDM: CPT

## 2024-08-06 PROCEDURE — 80053 COMPREHEN METABOLIC PANEL: CPT

## 2024-08-06 PROCEDURE — 85027 COMPLETE CBC AUTOMATED: CPT

## 2024-08-06 PROCEDURE — 96365 THER/PROPH/DIAG IV INF INIT: CPT

## 2024-08-06 PROCEDURE — 93005 ELECTROCARDIOGRAM TRACING: CPT

## 2024-08-06 PROCEDURE — 85025 COMPLETE CBC W/AUTO DIFF WBC: CPT

## 2024-08-06 PROCEDURE — 36415 COLL VENOUS BLD VENIPUNCTURE: CPT

## 2024-08-06 PROCEDURE — 70450 CT HEAD/BRAIN W/O DYE: CPT

## 2024-08-06 PROCEDURE — 99222 1ST HOSP IP/OBS MODERATE 55: CPT | Performed by: PSYCHIATRY & NEUROLOGY

## 2024-08-06 PROCEDURE — 93010 ELECTROCARDIOGRAM REPORT: CPT | Performed by: INTERNAL MEDICINE

## 2024-08-06 PROCEDURE — 81001 URINALYSIS AUTO W/SCOPE: CPT

## 2024-08-06 PROCEDURE — 83615 LACTATE (LD) (LDH) ENZYME: CPT

## 2024-08-06 PROCEDURE — 70551 MRI BRAIN STEM W/O DYE: CPT

## 2024-08-06 PROCEDURE — 84550 ASSAY OF BLOOD/URIC ACID: CPT

## 2024-08-06 PROCEDURE — NC001 PR NO CHARGE: Performed by: OBSTETRICS & GYNECOLOGY

## 2024-08-06 PROCEDURE — 82948 REAGENT STRIP/BLOOD GLUCOSE: CPT

## 2024-08-06 RX ORDER — BENZOCAINE/MENTHOL 6 MG-10 MG
1 LOZENGE MUCOUS MEMBRANE DAILY PRN
Status: DISCONTINUED | OUTPATIENT
Start: 2024-08-06 | End: 2024-08-08 | Stop reason: HOSPADM

## 2024-08-06 RX ORDER — LABETALOL HYDROCHLORIDE 5 MG/ML
40 INJECTION, SOLUTION INTRAVENOUS ONCE
Status: COMPLETED | OUTPATIENT
Start: 2024-08-06 | End: 2024-08-06

## 2024-08-06 RX ORDER — DIPHENHYDRAMINE HCL 25 MG
25 TABLET ORAL EVERY 6 HOURS PRN
Status: DISCONTINUED | OUTPATIENT
Start: 2024-08-06 | End: 2024-08-08 | Stop reason: HOSPADM

## 2024-08-06 RX ORDER — CALCIUM GLUCONATE 94 MG/ML
1 INJECTION, SOLUTION INTRAVENOUS ONCE AS NEEDED
Status: DISCONTINUED | OUTPATIENT
Start: 2024-08-06 | End: 2024-08-08 | Stop reason: HOSPADM

## 2024-08-06 RX ORDER — MAGNESIUM SULFATE HEPTAHYDRATE 40 MG/ML
1 INJECTION, SOLUTION INTRAVENOUS CONTINUOUS
Status: DISCONTINUED | OUTPATIENT
Start: 2024-08-06 | End: 2024-08-07

## 2024-08-06 RX ORDER — SIMETHICONE 80 MG
80 TABLET,CHEWABLE ORAL 4 TIMES DAILY PRN
Status: DISCONTINUED | OUTPATIENT
Start: 2024-08-06 | End: 2024-08-08 | Stop reason: HOSPADM

## 2024-08-06 RX ORDER — MAGNESIUM HYDROXIDE/ALUMINUM HYDROXICE/SIMETHICONE 120; 1200; 1200 MG/30ML; MG/30ML; MG/30ML
15 SUSPENSION ORAL EVERY 6 HOURS PRN
Status: DISCONTINUED | OUTPATIENT
Start: 2024-08-06 | End: 2024-08-08 | Stop reason: HOSPADM

## 2024-08-06 RX ORDER — ONDANSETRON 2 MG/ML
4 INJECTION INTRAMUSCULAR; INTRAVENOUS EVERY 8 HOURS PRN
Status: DISCONTINUED | OUTPATIENT
Start: 2024-08-06 | End: 2024-08-08 | Stop reason: HOSPADM

## 2024-08-06 RX ORDER — CALCIUM CARBONATE 500 MG/1
1000 TABLET, CHEWABLE ORAL 3 TIMES DAILY PRN
Status: DISCONTINUED | OUTPATIENT
Start: 2024-08-06 | End: 2024-08-08 | Stop reason: HOSPADM

## 2024-08-06 RX ORDER — DOCUSATE SODIUM 100 MG/1
100 CAPSULE, LIQUID FILLED ORAL 2 TIMES DAILY
Status: DISCONTINUED | OUTPATIENT
Start: 2024-08-06 | End: 2024-08-08 | Stop reason: HOSPADM

## 2024-08-06 RX ORDER — SODIUM CHLORIDE, SODIUM LACTATE, POTASSIUM CHLORIDE, CALCIUM CHLORIDE 600; 310; 30; 20 MG/100ML; MG/100ML; MG/100ML; MG/100ML
50 INJECTION, SOLUTION INTRAVENOUS CONTINUOUS
Status: DISCONTINUED | OUTPATIENT
Start: 2024-08-06 | End: 2024-08-07

## 2024-08-06 RX ORDER — SENNOSIDES 8.6 MG
1 TABLET ORAL DAILY
Status: DISCONTINUED | OUTPATIENT
Start: 2024-08-06 | End: 2024-08-08 | Stop reason: HOSPADM

## 2024-08-06 RX ORDER — LABETALOL HYDROCHLORIDE 5 MG/ML
20 INJECTION, SOLUTION INTRAVENOUS ONCE
Status: COMPLETED | OUTPATIENT
Start: 2024-08-06 | End: 2024-08-06

## 2024-08-06 RX ORDER — MAGNESIUM SULFATE HEPTAHYDRATE 40 MG/ML
4 INJECTION, SOLUTION INTRAVENOUS ONCE
Status: COMPLETED | OUTPATIENT
Start: 2024-08-06 | End: 2024-08-06

## 2024-08-06 RX ORDER — LABETALOL HYDROCHLORIDE 5 MG/ML
10 INJECTION, SOLUTION INTRAVENOUS ONCE
Status: DISCONTINUED | OUTPATIENT
Start: 2024-08-06 | End: 2024-08-06

## 2024-08-06 RX ORDER — LEVETIRACETAM 750 MG/1
750 TABLET ORAL EVERY 12 HOURS SCHEDULED
Status: DISCONTINUED | OUTPATIENT
Start: 2024-08-07 | End: 2024-08-08 | Stop reason: HOSPADM

## 2024-08-06 RX ORDER — METOCLOPRAMIDE HYDROCHLORIDE 5 MG/ML
10 INJECTION INTRAMUSCULAR; INTRAVENOUS ONCE
Status: COMPLETED | OUTPATIENT
Start: 2024-08-06 | End: 2024-08-06

## 2024-08-06 RX ORDER — ACETAMINOPHEN 325 MG/1
650 TABLET ORAL EVERY 6 HOURS SCHEDULED
Status: DISCONTINUED | OUTPATIENT
Start: 2024-08-06 | End: 2024-08-08 | Stop reason: HOSPADM

## 2024-08-06 RX ORDER — LEVETIRACETAM 500 MG/5ML
2000 INJECTION, SOLUTION, CONCENTRATE INTRAVENOUS ONCE
Status: COMPLETED | OUTPATIENT
Start: 2024-08-06 | End: 2024-08-06

## 2024-08-06 RX ADMIN — METOCLOPRAMIDE 10 MG: 5 INJECTION, SOLUTION INTRAMUSCULAR; INTRAVENOUS at 08:03

## 2024-08-06 RX ADMIN — LEVETIRACETAM 2000 MG: 100 INJECTION, SOLUTION INTRAVENOUS at 18:13

## 2024-08-06 RX ADMIN — MAGNESIUM SULFATE HEPTAHYDRATE 4 G: 40 INJECTION, SOLUTION INTRAVENOUS at 05:16

## 2024-08-06 RX ADMIN — LABETALOL HYDROCHLORIDE 20 MG: 5 INJECTION, SOLUTION INTRAVENOUS at 06:13

## 2024-08-06 RX ADMIN — SODIUM CHLORIDE 1000 ML: 0.9 INJECTION, SOLUTION INTRAVENOUS at 05:18

## 2024-08-06 RX ADMIN — SODIUM CHLORIDE, SODIUM LACTATE, POTASSIUM CHLORIDE, AND CALCIUM CHLORIDE 50 ML/HR: .6; .31; .03; .02 INJECTION, SOLUTION INTRAVENOUS at 07:30

## 2024-08-06 RX ADMIN — ACETAMINOPHEN 650 MG: 325 TABLET, FILM COATED ORAL at 12:06

## 2024-08-06 RX ADMIN — LABETALOL HYDROCHLORIDE 40 MG: 5 INJECTION, SOLUTION INTRAVENOUS at 07:43

## 2024-08-06 RX ADMIN — MAGNESIUM SULFATE HEPTAHYDRATE 1 G/HR: 40 INJECTION, SOLUTION INTRAVENOUS at 07:33

## 2024-08-06 NOTE — ASSESSMENT & PLAN NOTE
Cr 0.6 (baseline) -> 1.34 (admission) -> 1.18 (8/7)  S/p Magnesium (4g bolus, 1g/hr maintenance)    Trend Cr on am BMP 8/8 (drawn & pending)

## 2024-08-06 NOTE — ASSESSMENT & PLAN NOTE
EPDS 16  Started on Zoloft at a postpartum problem visit prior to current admission  Home Zoloft held per neurology's recommendations as it can be associated with RCVS  - They also recommend avoiding Wellbutrin which lowers the threshold    Consider psych consult for alternative medication recommendations for PPD

## 2024-08-06 NOTE — QUICK NOTE
Vitals:    08/06/24 0600   BP: (S) (!) 176/94   Pulse: 80   Resp: 17   Temp:    SpO2: 94%       Patient noted to have sustained severe range BP in ED. 20 mg IV labetalol ordered and instructions to repeat BP 10 minutes after IV push relayed to ED providers. Admit orders have been placed per H&P, will plan to monitor patient on L&D.    Raegan Vance MD  PGY-2 OBGYN

## 2024-08-06 NOTE — TELEPHONE ENCOUNTER
Consult placed on [facility] queue at 0912 to be seen by an [facility] psychiatrist.

## 2024-08-06 NOTE — ASSESSMENT & PLAN NOTE
Hgb prior to d/c after delivery 11.5  Admission Hgb 14.0, likely hemoconcentrated  Last Hgb 8/7 13.7

## 2024-08-06 NOTE — ED PROVIDER NOTES
"History  Chief Complaint   Patient presents with    Seizure - New Onset     BIBA-  witnessed tonic clonic movements and \"foaming at mouth\" at approx 0410. Pt with emergent  x7 days ago. Pt c/o N/V.     Patient is a 23-year-old female presented for evaluation of seizure-like activity.  Patient is status post  section on 2024.  She had a relatively uncomplicated pregnancy and has been doing well up until today where she was noted to have seizure-like activity.  It is not clear exactly what was witnessed.  She reports feeling little bit tired at this point in time but does not have any memory of the seizure activity.  Patient notably hypertensive for EMS.  She has no history of eclampsia/preeclampsia.  She is otherwise without complaint.        Prior to Admission Medications   Prescriptions Last Dose Informant Patient Reported? Taking?   Prenatal Vit-Fe Fumarate-FA (PRENATAL 1+1 PO) Not Taking Self Yes No   Sig: Take by mouth   Patient not taking: Reported on 2024   acetaminophen (TYLENOL) 325 mg tablet Past Week  No Yes   Sig: Take 2 tablets (650 mg total) by mouth every 6 (six) hours as needed for mild pain   aluminum-magnesium hydroxide-simethicone (MAALOX) 6250-0578-321 mg/30 mL suspension Not Taking  No No   Sig: Take 15 mL by mouth every 6 (six) hours as needed for indigestion or heartburn   Patient not taking: Reported on 2024   calcium carbonate (TUMS) 500 mg chewable tablet Not Taking  No No   Sig: Chew 2 tablets (1,000 mg total) daily as needed for indigestion or heartburn   Patient not taking: Reported on 2024   ibuprofen (MOTRIN) 600 mg tablet Past Week  No Yes   Sig: Take 1 tablet (600 mg total) by mouth every 6 (six) hours   polyethylene glycol (MIRALAX) 17 g packet Not Taking  No No   Sig: Take 17 g by mouth daily   Patient not taking: Reported on 2024   sertraline (Zoloft) 50 mg tablet 2024  No Yes   Sig: Take 1 tablet (50 mg total) by mouth daily "   simethicone (MYLICON) 80 mg chewable tablet Not Taking  No No   Sig: Chew 1 tablet (80 mg total) 4 (four) times a day as needed for flatulence   Patient not taking: Reported on 2024   witch hazel-glycerin (TUCKS) topical pad Not Taking  No No   Sig: Apply 1 Pad topically every 4 (four) hours as needed for irritation   Patient not taking: Reported on 2024      Facility-Administered Medications: None       Past Medical History:   Diagnosis Date    Asthma     childhood       Past Surgical History:   Procedure Laterality Date    MI  DELIVERY ONLY N/A 2024    Procedure:  SECTION ();  Surgeon: Seema Last MD;  [facility]: [facility];  Service: Obstetrics       Family History   Problem Relation Age of Onset    Cancer Mother         Cervical    Asthma Paternal Grandmother     Diabetes Paternal Grandmother     Hypertension Paternal Grandmother     Stroke Paternal Grandfather 80    Deep vein thrombosis Neg Hx     Pulmonary embolism Neg Hx      I have reviewed and agree with the history as documented.    E-Cigarette/Vaping    E-Cigarette Use Never User      E-Cigarette/Vaping Substances     Social History     Tobacco Use    Smoking status: Never    Smokeless tobacco: Never   Vaping Use    Vaping status: Never Used   Substance Use Topics    Alcohol use: Not Currently    Drug use: Never       Review of Systems   Constitutional:  Positive for fatigue.   Respiratory:  Negative for shortness of breath.    Cardiovascular:  Negative for chest pain.   Gastrointestinal:  Negative for abdominal pain.   Neurological:  Negative for weakness, numbness and headaches.       Physical Exam  Physical Exam  Vitals and nursing note reviewed.   Constitutional:       General: She is not in acute distress.     Appearance: Normal appearance. She is not ill-appearing or toxic-appearing.   HENT:      Head: Normocephalic and atraumatic.      Right Ear: External ear normal.      Left Ear: External ear normal.       Nose: Nose normal.      Mouth/Throat:      Mouth: Mucous membranes are moist.   Eyes:      General: No visual field deficit or scleral icterus.        Right eye: No discharge.         Left eye: No discharge.      Extraocular Movements: Extraocular movements intact.      Conjunctiva/sclera: Conjunctivae normal.      Pupils: Pupils are equal, round, and reactive to light.   Cardiovascular:      Rate and Rhythm: Normal rate and regular rhythm.      Pulses: Normal pulses.      Heart sounds: Normal heart sounds. No murmur heard.     No friction rub. No gallop.   Pulmonary:      Effort: Pulmonary effort is normal. No respiratory distress.      Breath sounds: Normal breath sounds. No wheezing, rhonchi or rales.   Abdominal:      General: Abdomen is flat. There is no distension.      Palpations: Abdomen is soft. There is no mass.      Tenderness: There is no abdominal tenderness.   Genitourinary:     Comments: Deferred  Musculoskeletal:      Right lower leg: No edema.      Left lower leg: No edema.   Skin:     General: Skin is warm and dry.   Neurological:      General: No focal deficit present.      Mental Status: She is alert and oriented to person, place, and time.      GCS: GCS eye subscore is 4. GCS verbal subscore is 5. GCS motor subscore is 6.      Cranial Nerves: No cranial nerve deficit, dysarthria or facial asymmetry.      Sensory: Sensation is intact. No sensory deficit.      Motor: Motor function is intact. No pronator drift.      Coordination: Coordination is intact. Finger-Nose-Finger Test normal.   Psychiatric:         Mood and Affect: Mood normal.         Vital Signs  ED Triage Vitals   Temperature Pulse Respirations Blood Pressure SpO2   08/06/24 0504 08/06/24 0501 08/06/24 0501 08/06/24 0501 08/06/24 0501   98.7 °F (37.1 °C) 82 16 (!) 173/101 98 %      Temp Source Heart Rate Source Patient Position - Orthostatic VS BP Location FiO2 (%)   08/06/24 0504 08/06/24 0501 08/06/24 0501 08/06/24 0501 --   Oral  Monitor Lying Right arm       Pain Score       08/06/24 0754       No Pain           Vitals:    08/06/24 0936 08/06/24 0938 08/06/24 0943 08/06/24 0948   BP: 135/84      Pulse: 63 67 66 67   Patient Position - Orthostatic VS:             Visual Acuity      ED Medications  Medications   calcium carbonate (TUMS) chewable tablet 1,000 mg (has no administration in time range)   aluminum-magnesium hydroxide-simethicone (MAALOX) oral suspension 15 mL (has no administration in time range)   simethicone (MYLICON) chewable tablet 80 mg (has no administration in time range)   ondansetron (ZOFRAN) injection 4 mg (has no administration in time range)   docusate sodium (COLACE) capsule 100 mg (100 mg Oral Not Given 8/6/24 0933)   senna (SENOKOT) tablet 8.6 mg (0 mg Oral Hold 8/6/24 0933)   acetaminophen (TYLENOL) tablet 650 mg (0 mg Oral Hold 8/6/24 0933)   diphenhydrAMINE (BENADRYL) tablet 25 mg (has no administration in time range)   hydrocortisone 1 % cream 1 Application (has no administration in time range)   magnesium sulfate 20 g/500 mL infusion (premix) (1 g/hr Intravenous New Bag 8/6/24 0733)   calcium gluconate 10 % injection 1 g (has no administration in time range)   lactated ringers infusion (has no administration in time range)   sertraline (ZOLOFT) tablet 50 mg (has no administration in time range)   magnesium sulfate 4 g/100 mL IVPB (premix) 4 g (0 g Intravenous Stopped 8/6/24 0601)   sodium chloride 0.9 % bolus 1,000 mL (0 mL Intravenous Stopped 8/6/24 0649)   labetalol (NORMODYNE) injection 20 mg (20 mg Intravenous Given 8/6/24 0613)   labetalol (NORMODYNE) injection 40 mg (40 mg Intravenous Given 8/6/24 0743)   metoclopramide (REGLAN) injection 10 mg (10 mg Intravenous Given 8/6/24 0803)       Diagnostic Studies  Results Reviewed       Procedure Component Value Units Date/Time    Urine Microscopic [[id number]]  (Abnormal) Collected: 08/06/24 0601    Lab Status: Final result Specimen: Urine, Straight Cath  Updated: 08/06/24 0640     RBC, UA 2-4 /hpf      WBC, UA 2-4 /hpf      Epithelial Cells None Seen /hpf      Bacteria, UA Occasional /hpf      MUCUS THREADS Occasional    UA w Reflex to Microscopic w Reflex to Culture [[id number]]  (Abnormal) Collected: 08/06/24 0601    Lab Status: Final result Specimen: Urine, Straight Cath Updated: 08/06/24 0638     Color, UA Colorless     Clarity, UA Clear     Specific Gravity, UA 1.008     pH, UA 7.0     Leukocytes, UA Negative     Nitrite, UA Negative     Protein, UA Trace mg/dl      Glucose, UA Negative mg/dl      Ketones, UA Negative mg/dl      Urobilinogen, UA <2.0 mg/dl      Bilirubin, UA Negative     Occult Blood, UA Trace    Comprehensive metabolic panel [[id number]]  (Abnormal) Collected: 08/06/24 0509    Lab Status: Final result Specimen: Blood from Arm, Left Updated: 08/06/24 0531     Sodium 138 mmol/L      Potassium 5.3 mmol/L      Chloride 103 mmol/L      CO2 21 mmol/L      ANION GAP 14 mmol/L      BUN 26 mg/dL      Creatinine 1.34 mg/dL      Glucose 122 mg/dL      Calcium 9.3 mg/dL      AST 18 U/L      ALT 10 U/L      Alkaline Phosphatase 64 U/L      Total Protein 7.3 g/dL      Albumin 3.9 g/dL      Total Bilirubin 0.84 mg/dL      eGFR 55 ml/min/1.73sq m     Narrative:      National Kid[employer] Disease Foundation guidelines for Chronic Kidney Disease (CKD):     Stage 1 with normal or high GFR (GFR > 90 mL/min/1.73 square meters)    Stage 2 Mild CKD (GFR = 60-89 mL/min/1.73 square meters)    Stage 3A Moderate CKD (GFR = 45-59 mL/min/1.73 square meters)    Stage 3B Moderate CKD (GFR = 30-44 mL/min/1.73 square meters)    Stage 4 Severe CKD (GFR = 15-29 mL/min/1.73 square meters)    Stage 5 End Stage CKD (GFR <15 mL/min/1.73 square meters)  Note: GFR calculation is accurate only with a steady state creatinine    Magnesium [[id number]]  (Normal) Collected: 08/06/24 0509    Lab Status: Final result Specimen: Blood from Arm, Left Updated: 08/06/24 0531     Magnesium 2.1 mg/dL      LD,Blood [[id number]]  (Abnormal) Collected: 08/06/24 0509    Lab Status: Final result Specimen: Blood from Arm, Left Updated: 08/06/24 0531      U/L     Uric acid [[id number]]  (Abnormal) Collected: 08/06/24 0509    Lab Status: Final result Specimen: Blood from Arm, Left Updated: 08/06/24 0531     Uric Acid 9.3 mg/dL     Narrative:      N-acetyl-p-benzoquinone imine (metabolite of Acetaminophen) will generate erroneously low results in samples for patients that have taken an overdose of Acetaminophen.    Fingerstick Glucose (POCT) [[id number]]  (Normal) Collected: 08/06/24 0514    Lab Status: Final result Specimen: Blood Updated: 08/06/24 0517     POC Glucose 131 mg/dl     CBC and differential [[id number]]  (Abnormal) Collected: 08/06/24 0509    Lab Status: Final result Specimen: Blood from Arm, Left Updated: 08/06/24 0516     WBC 8.30 Thousand/uL      RBC 4.91 Million/uL      Hemoglobin 14.0 g/dL      Hematocrit 43.3 %      MCV 88 fL      MCH 28.5 pg      MCHC 32.3 g/dL      RDW 19.5 %      MPV 9.5 fL      Platelets 260 Thousands/uL      nRBC 0 /100 WBCs      Segmented % 87 %      Immature Grans % 1 %      Lymphocytes % 8 %      Monocytes % 3 %      Eosinophils Relative 1 %      Basophils Relative 0 %      Absolute Neutrophils 7.26 Thousands/µL      Absolute Immature Grans 0.06 Thousand/uL      Absolute Lymphocytes 0.67 Thousands/µL      Absolute Monocytes 0.25 Thousand/µL      Eosinophils Absolute 0.04 Thousand/µL      Basophils Absolute 0.02 Thousands/µL                    CT head wo contrast    (Results Pending)              Procedures  Procedures         ED Course  ED Course as of 08/06/24 0958   Tue Aug 06, 2024   0512 Glucose 100s per EMS   0530 Procedure Note: EKG  Date/Time: 08/06/24 5:30 AM   Interpreted by: Lefty Thomas   Indications / Diagnosis: seizure activiti  ECG reviewed by me, the ED Provider: yes   The EKG demonstrates:  Rhythm: normal sinus  Intervals: normal intervals  Axis: normal  axis  QRS/Blocks: normal QRS  ST Changes: No acute ST Changes, no STD/HORTENCIA.                                 SBIRT 22yo+      Flowsheet Row Most Recent Value   Initial Alcohol Screen: US AUDIT-C     1. How often do you have a drink containing alcohol? 0 Filed at: 08/06/2024 0504   2. How many drinks containing alcohol do you have on a typical day you are drinking?  0 Filed at: 08/06/2024 0504   3b. FEMALE Any Age, or MALE 65+: How often do you have 4 or more drinks on one occassion? 0 Filed at: 08/06/2024 0504   Audit-C Score 0 Filed at: 08/06/2024 0504   LUCIANA: How many times in the past year have you...    Used an illegal drug or used a prescription medication for non-medical reasons? Never Filed at: 08/06/2024 0504                      Medical Decision Making  Patient with history as above presented with seizure activity. History obtained from patient.    Differential diagnosis includes: Eclampsia, electrolyte disturbance, anemia, arrhythmia    Plan: CBC, CMP, uric acid, LDH, urinalysis, magnesium, close blood pressure monitoring and labetalol if persistently elevated, will discuss with OB/GYN    ECG independently interpreted by myself as above. Labs reviewed with mild elevation in LDH, uric acid, pending urine. Discussed patient's management with OB/GYN who was initially recommending holding off on antihypertensives given the improvement in blood pressure on its own, however did have repeat elevated blood pressure so 20 of labetalol ordered by OB/GYN.  Discussed further with OB/GYN who agreed to admit patient under their service.    Amount and/or Complexity of Data Reviewed  Labs: ordered.    Risk  Prescription drug management.  Decision regarding hospitalization.                 Disposition  Final diagnoses:   Seizure-like activity (HCC)   Eclampsia     Time reflects when diagnosis was documented in both MDM as applicable and the Disposition within this note       Time User Action Codes Description Comment     2024  5:31 AM Lefty Thomas Add [R56.9] Seizure-like activity (HCC)     2024  5:39 AM Raegan Vance Add [F53.0] Postpartum depression     2024  5:50 AM Lefty Thomas Add [O15.9] Eclampsia           ED Disposition       ED Disposition   Admit    Condition   Stable    Date/Time   Tue Aug 6, 2024 1796    Comment   Case was discussed with obgyn and the patient's admission status was agreed to be Admission Status: inpatient status to the service of Dr. Last .               Follow-up Information    None         Current Discharge Medication List        CONTINUE these medications which have NOT CHANGED    Details   acetaminophen (TYLENOL) 325 mg tablet Take 2 tablets (650 mg total) by mouth every 6 (six) hours as needed for mild pain    Associated Diagnoses: Status post primary low transverse  section      ibuprofen (MOTRIN) 600 mg tablet Take 1 tablet (600 mg total) by mouth every 6 (six) hours  Qty: 30 tablet, Refills: 0    Associated Diagnoses: Status post primary low transverse  section      sertraline (Zoloft) 50 mg tablet Take 1 tablet (50 mg total) by mouth daily  Qty: 30 tablet, Refills: 0    Associated Diagnoses: Postpartum depression      aluminum-magnesium hydroxide-simethicone (MAALOX) 1382-0595-712 mg/30 mL suspension Take 15 mL by mouth every 6 (six) hours as needed for indigestion or heartburn    Associated Diagnoses: Status post primary low transverse  section      calcium carbonate (TUMS) 500 mg chewable tablet Chew 2 tablets (1,000 mg total) daily as needed for indigestion or heartburn    Associated Diagnoses: Status post primary low transverse  section      polyethylene glycol (MIRALAX) 17 g packet Take 17 g by mouth daily    Associated Diagnoses: Status post primary low transverse  section      Prenatal Vit-Fe Fumarate-FA (PRENATAL 1+1 PO) Take by mouth      simethicone (MYLICON) 80 mg chewable tablet Chew 1 tablet (80 mg total) 4 (four)  times a day as needed for flatulence    Associated Diagnoses: Status post primary low transverse  section      witch hazel-glycerin (TUCKS) topical pad Apply 1 Pad topically every 4 (four) hours as needed for irritation    Associated Diagnoses: Status post primary low transverse  section             No discharge procedures on file.    PDMP Review       None            ED Provider  Electronically Signed by             Lefty Thomas DO  24 0958

## 2024-08-06 NOTE — TELEMEDICINE
TeleConsultation - Behavioral Health   Ana Banuelos 23 y.o. female MRN: [id number]  Unit/Bed#: -01 Encounter: [id number]      VIRTUAL CARE DOCUMENTATION:     1. This service was provided via Telemedicine using Teams Virtual Rounding      2. Parties in the room with patient during teleconsult Patient only    3. Confidentiality My office door was closed     4. Participants No one else was in the room    5. Patient acknowledged consent and understanding of privacy and security of the  Telemedicine consult. I informed the patient that I have reviewed their record in Epic and presented the opportunity for them to ask any questions regarding the visit today.  The patient agreed to participate.    6. Time spent 30       Assessment & Plan     Present on Admission:   Anemia   Postpartum depression    Assessment:    23-year-old female presenting with postpartum depression    Treatment Plan:    The option to continue with Zoloft versus changing to another antidepressant were discussed with the patient for treatment of her postpartum depression.  She chooses to continue the Zoloft 50 mg p.o. daily so no change in medication is recommended at this time.  Upon discharge the patient should have outpatient psychiatric follow-up to include medication management with psychotherapy/counseling component to assist the patient in further optimizing her coping skills for dealing with current life stressors and her postpartum depression.  No additional psychiatric precautions are indicated at this time.  Reconsult psychiatry as needed.    Current Medications:     Current Facility-Administered Medications   Medication Dose Route Frequency Provider Last Rate    acetaminophen  650 mg Oral Q6H LUKE Raegan Vance MD      aluminum-magnesium hydroxide-simethicone  15 mL Oral Q6H PRN Raegan Vance MD      calcium carbonate  1,000 mg Oral TID PRN Raegan Vance MD      calcium gluconate  1 g Intravenous Once PRELISA Carlin  "MD Pinky      diphenhydrAMINE  25 mg Oral Q6H PRN Raegan Vance MD      docusate sodium  100 mg Oral BID Raegan Vance MD      hydrocortisone  1 Application Topical Daily PRN Raegan Vance MD      lactated ringers  50 mL/hr Intravenous Continuous Marcia Cheung MD      magnesium sulfate  1 g/hr Intravenous Continuous Raegan Vance MD 1 g/hr (24 0733)    ondansetron  4 mg Intravenous Q8H PRN Raegan Vance MD      senna  1 tablet Oral Daily Raegan Vance MD      sertraline  50 mg Oral HS Marcia Cheung MD      simethicone  80 mg Oral 4x Daily PRN Raegan Vance MD         Risks / Benefits of Treatment:    Risks, benefits, and possible side effects of medications explained to patient and patient verbalizes understanding.      Other treatment modalities recommended as indicated:    psychotherapy  outpatient referral      Inpatient consult to Psychiatry  Consult performed by: Varinder Arambula MD  Consult ordered by: Raegan Vance MD        Physician Requesting Consult: Janette Carr MD  Principal Problem:Eclampsia    Reason for Consult: Postpartum depression      History of Present Illness      Patient is a 23 y.o. female for whom psychiatry consult is requested for postpartum depression.  The following is documented in the H&P: \"Ana Banuelos is a 23 y.o.  with an BETHANY of 2024, by Last Menstrual Period at 40w3d who is being admitted for suspected eclampsia. History from patient and her  at bedside. Patient reports that she took her first dose of Zoloft last night and developed a headache shortly thereafter; she thought the headache was due to the Zoloft so she took some ibuprofen but subsequently vomited it up. She went to bed despite the headache, which she describes as severe, encasing her whole head, and was associated with dark spots in her vision.  then reports that around 0400 he was woken up to her shaking against " "him. He could also hear \"gurgling\" coming from her mouth and thought she was having difficulty breathing. He states that the shaking subsided on its own within a couple of minutes, but immediately thereafter Ana seemed to have fluctuating consciousness and was not responsive to him. Ana reports that she has no recollection of this time or of getting into the ambulance which was subsequently called. On presentation to ED, she notes that she continues to feel nauseous and vomit, but she denies any current headache, changes in vision, chest pain, difficulty breathing, epigastric or RUQ pain. She reports minimal lochia.     Of note, patient was delivered by 1LTCS due to category 2 fetal heart tracing remote from delivery. Pregnancy was otherwise complicated by anemia treated with venofer infusions, rubella non-immunity, and Rh negative status. She had no blood pressures issues this pregnancy. Postpartum period has been complicated by severe depression (EPDS 16), which is why she was started on the zoloft.     Problem List       Patient Active Problem List   Diagnosis    Family history of cerebral palsy    Anemia    Postpartum depression    MANDY (acute kidney injury) (HCC)    Eclampsia         Review of Systems     OB Hx:                   OB History    Para Term  AB Living   2 1 1   1 1   SAB IAB Ectopic Multiple Live Births      1 0 0 0 1          # Outcome Date GA Lbr Fabien/2nd Weight Sex Type Anes PTL Lv   2 Term 24 40w3d   3330 g (7 lb 5.5 oz) F CS-LTranv EPI N NATA      Complications: Fetal Intolerance   1 SAB 23         Biochemical               Past Medical Hx:  Medical History        Past Medical History:   Diagnosis Date    Asthma       childhood            Past Surgical hx:  Surgical History[]Expand by Default         Past Surgical History:   Procedure Laterality Date    [place]  DELIVERY ONLY N/A 2024     Procedure:  SECTION ();  Surgeon: Seema Last, " "MD;  Location: [facility];  Service: Obstetrics            Social Hx:  Alcohol use: denies  Tobacco use: denies  Other substance use: denies     Allergies   No Known Allergies        Prescriptions Prior to Admission   Not in a hospital admission.        Objective:  Temp:  [98.7 °F (37.1 °C)] 98.7 °F (37.1 °C)  HR:  [] 80  Resp:  [16-21] 17  BP: (136-176)/() 176/94  There is no height or weight on file to calculate BMI.\"    In meeting with the patient, she reports onset of depression following the birth of her daughter July 28, 2024.  She reports decreased appetite, sleep and energy as well as anhedonia.  She states she is able to focus on tasks as usual.    Past psychiatric history: Unremarkable    Social history: The patient is .  She and her  have the 1 daughter born in July.  The patient is not employed at this time.  She reports no abuse.    Family history: Unremarkable    Substance use history: Unremarkable    Dulzura suicide severity risk scale: The patient reports she has in the past had some fleeting thoughts of \"wishing I was not here\" but denies that she has ever had any thoughts of harming herself and states she would never do so scoring low risk for suicide.    Mental status examination: The patient is alert and well oriented all spheres.  Affect is somewhat depressed but she is pleasant and cooperative with the assessment making good eye contact.  Speech is unremarkable.  She presents with appropriate attention to grooming and hygiene.  Sensorium is clear.  Thought process is logical and linear.  Thought content is reality based.  Associations are tight.  Memory is grossly intact in all spheres.  She appears to be of average intelligence values vocabulary, general fund of knowledge, recent instructions intact.  She denies suicidal homicidal ideation.  She denies hallucinations or other psychotic features.  Insight and judgment intact.    Past Medical History:   Diagnosis Date    " Asthma     childhood       Medical Review Of Systems:    Review of Systems    Meds/Allergies     all current active meds have been reviewed  No Known Allergies    Objective     Vital signs in last 24 hours:  Temp:  [98.3 °F (36.8 °C)-98.7 °F (37.1 °C)] 98.3 °F (36.8 °C)  HR:  [] 69  Resp:  [16-21] 19  BP: (129-176)/() 138/83      Intake/Output Summary (Last 24 hours) at 8/6/2024 1028  Last data filed at 8/6/2024 0930  Gross per 24 hour   Intake 1100 ml   Output 1000 ml   Net 100 ml       Lab Results: I have personally reviewed all pertinent laboratory/tests results.         Imaging Studies: No results found.  EKG/Pathology/Other Studies:   Lab Results   Component Value Date    VENTRATE 79 08/06/2024    ATRIALRATE 79 08/06/2024    PRINT 146 08/06/2024    QRSDINT 76 08/06/2024    QTINT 388 08/06/2024    QTCINT 444 08/06/2024    PAXIS 55 08/06/2024    QRSAXIS -8 08/06/2024    TWAVEAXIS 35 08/06/2024        Code Status: Prior  Advance Directive and Living Will:      Power of :    POLST:      Screenings:    1. Nutrition Screening  Not available on chart    2. Pain Screening  Not available on chart    3. Suicide Screening  Not available on chart    Counseling / Coordination of Care:    Total floor / unit time spent today 30 minutes. Greater than 50% of total time was spent with the patient and / or family counseling and / or coordination of care. A description of the counseling / coordination of care: Chart review, patient evaluation, coordination communication with staff, nursing and provider.

## 2024-08-06 NOTE — ASSESSMENT & PLAN NOTE
Witnessed tonic-clonic activity ( @0400) with post-ictal state & non-sustained severe-range BP on presentation, consistent with eclampsia  - Now s/p 24 hr of Magnesium for seizure prophylaxis  - CMP notable for MANDY & hypokalemia (see problem list), otherwise wnl  - CBC wnl  - : Labetalol 20/40 mg IV  - CT head w/o contrast on  wnl  - MRI head w/o contrast on  w/ vasogenic edema (neurology team believes this is likely secondary to HTN)  - Repeat MRI w/ contrast on  w/ normal findings  - Keppra started  (2 g IV loading dose followed by 750 mg BID PO per neurology's recommendations)    Systolic (12hrs), Av , Min:135 , Max:137   Diastolic (12hrs), Av, Min:75, Max:85    Vitals q4h  Monitor for signs/symptoms of worsening eclampsia  Follow up BMP & CBC  am in s/o MANDY & hypokalemia (drawn & pending)

## 2024-08-06 NOTE — H&P
"H & P- Obstetrics   Ana Banuelos 23 y.o. female MRN: [id number]  Unit/Bed#: ED-07 Encounter: [id number]    Assessment: 23 y.o.  PPD9 s/p 1LTCS (cat 2 FHT) who presents with concern for eclamptic seizure.     Plan:   * Eclampsia  Assessment & Plan  Witnessed tonic-clonic activity ( @0400) with post-ictal state and BP on presentation severe (not sustained), consistent with eclampsia  Already received 4g bolus of Mag for seizure prophylaxis in ED   Plan to continue 1g/hr infusion rate of Mag x24 hours for continued seizure prophylaxis; lower bolus and rate chosen due to MANDY   Treat BP acutely PRN  Trend labs q6 while Mag running  Consider PO antihypertensive if BP remain persistently elevated    MADNY (acute kidney injury) (HCC)  Assessment & Plan  Cr 0.69 -> 1.34  Pt received 4g Mag bolus in ED, will continue with 1g/hr infusion rate and monitor kidney function closely    Postpartum depression  Assessment & Plan  EPDS 16  Recently started on Zoloft, continue as inpatient    Anemia  Assessment & Plan  HgB prior to d/c after delivery 11.5  Admission HgB 14.0, likely hemoconcentrated          Discussed case and plan w/ Dr. Last.    Chief Complaint: \"I woke up to her shaking against me.\"     HPI: Aan Banuelos is a 23 y.o.  with an BETHANY of 2024, by Last Menstrual Period at 40w3d who is being admitted for suspected eclampsia. History from patient and her  at bedside. Patient reports that she took her first dose of Zoloft last night and developed a headache shortly thereafter; she thought the headache was due to the Zoloft so she took some ibuprofen but subsequently vomited it up. She went to bed despite the headache, which she describes as severe, encasing her whole head, and was associated with dark spots in her vision.  then reports that around 0400 he was woken up to her shaking against him. He could also hear \"gurgling\" coming from her mouth and thought she was having " difficulty breathing. He states that the shaking subsided on its own within a couple of minutes, but immediately thereafter Ana seemed to have fluctuating consciousness and was not responsive to him. Ana reports that she has no recollection of this time or of getting into the ambulance which was subsequently called. On presentation to ED, she notes that she continues to feel nauseous and vomit, but she denies any current headache, changes in vision, chest pain, difficulty breathing, epigastric or RUQ pain. She reports minimal lochia.    Of note, patient was delivered by 1LTCS due to category 2 fetal heart tracing remote from delivery. Pregnancy was otherwise complicated by anemia treated with venofer infusions, rubella non-immunity, and Rh negative status. She had no blood pressures issues this pregnancy. Postpartum period has been complicated by severe depression (EPDS 16), which is why she was started on the zoloft.    Patient Active Problem List   Diagnosis    Family history of cerebral palsy    Anemia    Postpartum depression    MANDY (acute kidney injury) (HCC)    Eclampsia     Review of Systems    OB Hx:  OB History    Para Term  AB Living   2 1 1   1 1   SAB IAB Ectopic Multiple Live Births   1 0 0 0 1      # Outcome Date GA Lbr Fabien/2nd Weight Sex Type Anes PTL Lv   2 Term 24 40w3d  3330 g (7 lb 5.5 oz) F CS-LTranv EPI N NATA      Complications: Fetal Intolerance   1 SAB 23     Biochemical          Past Medical Hx:  Past Medical History:   Diagnosis Date    Asthma     childhood       Past Surgical hx:  Past Surgical History:   Procedure Laterality Date    [place]  DELIVERY ONLY N/A 2024    Procedure:  SECTION ();  Surgeon: Seema Last MD;  Location: [facility];  Service: Obstetrics       Social Hx:  Alcohol use: denies  Tobacco use: denies  Other substance use: denies    No Known Allergies    Not in a hospital admission.    Objective:  Temp:  [98.7 °F  (37.1 °C)] 98.7 °F (37.1 °C)  HR:  [] 80  Resp:  [16-21] 17  BP: (136-176)/() 176/94  There is no height or weight on file to calculate BMI.     Physical Exam:  Physical Exam  Constitutional:       Appearance: She is ill-appearing.   Abdominal:      Palpations: Abdomen is soft.      Comments: Fundus firm and below umbilicus  No tenderness to abdomen in any quadrant   Musculoskeletal:         General: Normal range of motion.   Neurological:      General: No focal deficit present.      Mental Status: She is alert and oriented to person, place, and time.   Skin:     General: Skin is warm and dry.   Vitals reviewed.               Lab Results   Component Value Date    WBC 8.30 08/06/2024    HGB 14.0 08/06/2024    HCT 43.3 08/06/2024     08/06/2024     Lab Results   Component Value Date    K 5.3 08/06/2024     08/06/2024    CO2 21 08/06/2024    BUN 26 (H) 08/06/2024    CREATININE 1.34 (H) 08/06/2024    AST 18 08/06/2024    ALT 10 08/06/2024     Signature/Title: Raegan Vance MD  Date: 8/6/2024  Time: 6:06 AM

## 2024-08-06 NOTE — PROGRESS NOTES
Reviewed CT and MRI findings at bedside with patient and Dr. Link.  Patient currently feels well without complaints.  Reviewed plan for neurology consult and neurology's preliminary recommendations as below.  Patient expressed understanding and was agreeable with the plan as stated.    Continue magnesium for seizure prophylaxis (plan to discontinue at 24 hr)  Initiate Keppra (2 g IV once followed by 750 mg BID PO which neurology can taper outpatient)  Likely repeat MRI in several days to see if edema is improving (if secondary to hypertension, should be improving by then)    Renay Hale MD  OBGYN Resident  08/06/24  4:55 PM

## 2024-08-06 NOTE — PLAN OF CARE
Problem: Potential for Falls  Goal: Patient will remain free of falls  Description: INTERVENTIONS:  - Educate patient/family on patient safety including physical limitations  - Instruct patient to call for assistance with activity   - Consult OT/PT to assist with strengthening/mobility   - Keep Call bell within reach  - Keep bed low and locked with side rails adjusted as appropriate  - Keep care items and personal belongings within reach  - Initiate and maintain comfort rounds  - Make Fall Risk Sign visible to staff  - Apply yellow socks and bracelet for high fall risk patients  - Consider moving patient to room near nurses station  Outcome: Progressing     Problem: PAIN - ADULT  Goal: Verbalizes/displays adequate comfort level or baseline comfort level  Description: Interventions:  - Encourage patient to monitor pain and request assistance  - Assess pain using appropriate pain scale  - Administer analgesics based on type and severity of pain and evaluate response  - Implement non-pharmacological measures as appropriate and evaluate response  - Consider cultural and social influences on pain and pain management  - Notify physician/advanced practitioner if interventions unsuccessful or patient reports new pain  Outcome: Progressing     Problem: SAFETY ADULT  Goal: Patient will remain free of falls  Description: INTERVENTIONS:  - Educate patient/family on patient safety including physical limitations  - Instruct patient to call for assistance with activity   - Consult OT/PT to assist with strengthening/mobility   - Keep Call bell within reach  - Keep bed low and locked with side rails adjusted as appropriate  - Keep care items and personal belongings within reach  - Initiate and maintain comfort rounds  - Make Fall Risk Sign visible to staff  - Apply yellow socks and bracelet for high fall risk patients  - Consider moving patient to room near nurses station  Outcome: Progressing  Goal: Maintain or return to baseline  ADL function  Description: INTERVENTIONS:  -  Assess patient's ability to carry out ADLs; assess patient's baseline for ADL function and identify physical deficits which impact ability to perform ADLs (bathing, care of mouth/teeth, toileting, grooming, dressing, etc.)  - Assess/evaluate cause of self-care deficits   - Assess range of motion  - Assess patient's mobility; develop plan if impaired  - Assess patient's need for assistive devices and provide as appropriate  - Encourage maximum independence but intervene and supervise when necessary  - Involve family in performance of ADLs  - Assess for home care needs following discharge   - Consider OT consult to assist with ADL evaluation and planning for discharge  - Provide patient education as appropriate  Outcome: Progressing  Goal: Maintains/Returns to pre admission functional level  Description: INTERVENTIONS:  - Perform AM-PAC 6 Click Basic Mobility/ Daily Activity assessment daily.  - Set and communicate daily mobility goal to care team and patient/family/caregiver.   - Collaborate with rehabilitation services on mobility goals if consulted  - Record patient progress and toleration of activity level   Outcome: Progressing     Problem: Knowledge Deficit  Goal: Patient/family/caregiver demonstrates understanding of disease process, treatment plan, medications, and discharge instructions  Description: Complete learning assessment and assess knowledge base.  Interventions:  - Provide teaching at level of understanding  - Provide teaching via preferred learning methods  Outcome: Progressing     Problem: DISCHARGE PLANNING  Goal: Discharge to home or other facility with appropriate resources  Description: INTERVENTIONS:  - Identify barriers to discharge w/patient and caregiver  - Arrange for needed discharge resources and transportation as appropriate  - Identify discharge learning needs (meds, wound care, etc.)  - Arrange for interpretive services to assist at  discharge as needed  - Refer to Case Management Department for coordinating discharge planning if the patient needs post-hospital services based on physician/advanced practitioner order or complex needs related to functional status, cognitive ability, or social support system  Outcome: Progressing

## 2024-08-06 NOTE — QUICK NOTE
Vitals:    08/06/24 0718   BP: 165/94   Pulse: 68   Resp: 19   Temp:    SpO2: 95%       First BP on L&D noted to be severe. Patient still within treatment algorithm. 40 mg IV labetalol ordered. Magnesium gtt not hung in ED, hanging it here ASAP.     Raegan Vance MD  PGY-2 OBGYN

## 2024-08-07 ENCOUNTER — APPOINTMENT (INPATIENT)
Dept: MRI IMAGING | Facility: HOSPITAL | Age: 24
DRG: 776 | End: 2024-08-07
Payer: COMMERCIAL

## 2024-08-07 PROBLEM — E87.6 HYPOKALEMIA: Status: ACTIVE | Noted: 2024-08-07

## 2024-08-07 PROBLEM — R90.89 ABNORMAL FINDING ON MRI OF BRAIN: Status: ACTIVE | Noted: 2024-08-07

## 2024-08-07 PROBLEM — O15.9 ECLAMPSIA: Chronic | Status: ACTIVE | Noted: 2024-08-06

## 2024-08-07 PROBLEM — Z98.891 STATUS POST PRIMARY LOW TRANSVERSE CESAREAN SECTION: Status: ACTIVE | Noted: 2024-08-07

## 2024-08-07 PROBLEM — G93.6 VASOGENIC BRAIN EDEMA (HCC): Status: ACTIVE | Noted: 2024-08-07

## 2024-08-07 PROBLEM — R56.9 NEW ONSET SEIZURE (HCC): Status: ACTIVE | Noted: 2024-08-07

## 2024-08-07 LAB
ALBUMIN SERPL BCG-MCNC: 3.6 G/DL (ref 3.5–5)
ALBUMIN SERPL BCG-MCNC: 3.7 G/DL (ref 3.5–5)
ALP SERPL-CCNC: 58 U/L (ref 34–104)
ALP SERPL-CCNC: 60 U/L (ref 34–104)
ALT SERPL W P-5'-P-CCNC: 10 U/L (ref 7–52)
ALT SERPL W P-5'-P-CCNC: 9 U/L (ref 7–52)
ANION GAP SERPL CALCULATED.3IONS-SCNC: 10 MMOL/L (ref 4–13)
ANION GAP SERPL CALCULATED.3IONS-SCNC: 10 MMOL/L (ref 4–13)
AST SERPL W P-5'-P-CCNC: 11 U/L (ref 13–39)
AST SERPL W P-5'-P-CCNC: 11 U/L (ref 13–39)
BILIRUB SERPL-MCNC: 0.63 MG/DL (ref 0.2–1)
BILIRUB SERPL-MCNC: 0.69 MG/DL (ref 0.2–1)
BUN SERPL-MCNC: 17 MG/DL (ref 5–25)
BUN SERPL-MCNC: 19 MG/DL (ref 5–25)
CALCIUM SERPL-MCNC: 8.6 MG/DL (ref 8.4–10.2)
CALCIUM SERPL-MCNC: 8.6 MG/DL (ref 8.4–10.2)
CHLORIDE SERPL-SCNC: 100 MMOL/L (ref 96–108)
CHLORIDE SERPL-SCNC: 99 MMOL/L (ref 96–108)
CO2 SERPL-SCNC: 26 MMOL/L (ref 21–32)
CO2 SERPL-SCNC: 27 MMOL/L (ref 21–32)
CREAT SERPL-MCNC: 1.18 MG/DL (ref 0.6–1.3)
CREAT SERPL-MCNC: 1.2 MG/DL (ref 0.6–1.3)
ERYTHROCYTE [DISTWIDTH] IN BLOOD BY AUTOMATED COUNT: 19.8 % (ref 11.6–15.1)
ERYTHROCYTE [DISTWIDTH] IN BLOOD BY AUTOMATED COUNT: 19.9 % (ref 11.6–15.1)
GFR SERPL CREATININE-BSD FRML MDRD: 62 ML/MIN/1.73SQ M
GFR SERPL CREATININE-BSD FRML MDRD: 64 ML/MIN/1.73SQ M
GLUCOSE SERPL-MCNC: 116 MG/DL (ref 65–140)
GLUCOSE SERPL-MCNC: 120 MG/DL (ref 65–140)
HCT VFR BLD AUTO: 39.9 % (ref 34.8–46.1)
HCT VFR BLD AUTO: 42 % (ref 34.8–46.1)
HGB BLD-MCNC: 13.1 G/DL (ref 11.5–15.4)
HGB BLD-MCNC: 13.7 G/DL (ref 11.5–15.4)
MAGNESIUM SERPL-MCNC: 6.6 MG/DL (ref 1.9–2.7)
MAGNESIUM SERPL-MCNC: 7 MG/DL (ref 1.9–2.7)
MCH RBC QN AUTO: 28.5 PG (ref 26.8–34.3)
MCH RBC QN AUTO: 28.8 PG (ref 26.8–34.3)
MCHC RBC AUTO-ENTMCNC: 32.6 G/DL (ref 31.4–37.4)
MCHC RBC AUTO-ENTMCNC: 32.8 G/DL (ref 31.4–37.4)
MCV RBC AUTO: 88 FL (ref 82–98)
MCV RBC AUTO: 88 FL (ref 82–98)
PLATELET # BLD AUTO: 263 THOUSANDS/UL (ref 149–390)
PLATELET # BLD AUTO: 276 THOUSANDS/UL (ref 149–390)
PMV BLD AUTO: 8.9 FL (ref 8.9–12.7)
PMV BLD AUTO: 9.2 FL (ref 8.9–12.7)
POTASSIUM SERPL-SCNC: 3.5 MMOL/L (ref 3.5–5.3)
POTASSIUM SERPL-SCNC: 3.6 MMOL/L (ref 3.5–5.3)
PROT SERPL-MCNC: 6.6 G/DL (ref 6.4–8.4)
PROT SERPL-MCNC: 6.7 G/DL (ref 6.4–8.4)
RBC # BLD AUTO: 4.55 MILLION/UL (ref 3.81–5.12)
RBC # BLD AUTO: 4.8 MILLION/UL (ref 3.81–5.12)
SODIUM SERPL-SCNC: 136 MMOL/L (ref 135–147)
SODIUM SERPL-SCNC: 136 MMOL/L (ref 135–147)
TSH SERPL DL<=0.05 MIU/L-ACNC: 0.83 UIU/ML (ref 0.45–4.5)
WBC # BLD AUTO: 9.02 THOUSAND/UL (ref 4.31–10.16)
WBC # BLD AUTO: 9.13 THOUSAND/UL (ref 4.31–10.16)

## 2024-08-07 PROCEDURE — 83735 ASSAY OF MAGNESIUM: CPT

## 2024-08-07 PROCEDURE — A9585 GADOBUTROL INJECTION: HCPCS | Performed by: OBSTETRICS & GYNECOLOGY

## 2024-08-07 PROCEDURE — 80053 COMPREHEN METABOLIC PANEL: CPT

## 2024-08-07 PROCEDURE — 85027 COMPLETE CBC AUTOMATED: CPT

## 2024-08-07 PROCEDURE — 99232 SBSQ HOSP IP/OBS MODERATE 35: CPT | Performed by: OBSTETRICS & GYNECOLOGY

## 2024-08-07 PROCEDURE — 70552 MRI BRAIN STEM W/DYE: CPT

## 2024-08-07 PROCEDURE — 84443 ASSAY THYROID STIM HORMONE: CPT | Performed by: PHYSICIAN ASSISTANT

## 2024-08-07 PROCEDURE — 99223 1ST HOSP IP/OBS HIGH 75: CPT | Performed by: PSYCHIATRY & NEUROLOGY

## 2024-08-07 RX ORDER — POTASSIUM CHLORIDE 20 MEQ/1
40 TABLET, EXTENDED RELEASE ORAL ONCE
Status: COMPLETED | OUTPATIENT
Start: 2024-08-07 | End: 2024-08-07

## 2024-08-07 RX ORDER — GADOBUTROL 604.72 MG/ML
7 INJECTION INTRAVENOUS
Status: COMPLETED | OUTPATIENT
Start: 2024-08-07 | End: 2024-08-07

## 2024-08-07 RX ADMIN — POTASSIUM CHLORIDE 40 MEQ: 1500 TABLET, EXTENDED RELEASE ORAL at 08:58

## 2024-08-07 RX ADMIN — LEVETIRACETAM 750 MG: 750 TABLET, FILM COATED ORAL at 21:27

## 2024-08-07 RX ADMIN — LEVETIRACETAM 750 MG: 750 TABLET, FILM COATED ORAL at 08:58

## 2024-08-07 RX ADMIN — GADOBUTROL 7 ML: 604.72 INJECTION INTRAVENOUS at 18:25

## 2024-08-07 RX ADMIN — SODIUM CHLORIDE, SODIUM LACTATE, POTASSIUM CHLORIDE, AND CALCIUM CHLORIDE 50 ML/HR: .6; .31; .03; .02 INJECTION, SOLUTION INTRAVENOUS at 00:29

## 2024-08-07 RX ADMIN — MAGNESIUM SULFATE HEPTAHYDRATE 1 G/HR: 40 INJECTION, SOLUTION INTRAVENOUS at 04:06

## 2024-08-07 NOTE — ASSESSMENT & PLAN NOTE
Noted on MRI w/o contrast on 8/6  Repeat MRI w/ contrast on 8/7 w/ normal findings  Reviewed finding w/ neurology team who suspect this is secondary to hypertension  Keppra started on 8/6  Magnesium discontinued on 8/7 s/p 24 hr    Neurology consulted, appreciate recommendations:  Continue Keppra as above  Repeat MRI in 1 week outpatient  Goal -160 mmHg

## 2024-08-07 NOTE — UTILIZATION REVIEW
NOTIFICATION OF INPATIENT ADMISSION   Medical Admission/Maternity Unit   SERVICING FACILITY:   [facility]  Parent Child Health - L&D, [place], NICU  [address] [place] [address]  Tax ID: [id number]  NPI: [id number]   ATTENDING PROVIDER:  Attending Name and NPI#: Janette Sotomayor Md [[id number]]  Address: [address] [place] [address]  Phone: [phone]     ADMISSION INFORMATION:  Place of Service: Inpatient [facility]  Place of Service Code: 21  Inpatient Admission Date/Time: 24  5:50 AM  Discharge Date/Time: No discharge date for patient encounter.  Admitting Diagnosis Code/Description:  Eclampsia [O15.9]  Postpartum depression [F53.0]  Seizure (HCC) [R56.9]  Seizure-like activity (HCC) [R56.9]     UTILIZATION REVIEW CONTACT:  Crystal Matthews, Utilization   Network Utilization Review Department  Phone: [phone]  Fax [phone]  Email: [email or web address]  Contact for approvals/pending authorizations, clinical reviews, and discharge.     PHYSICIAN ADVISORY SERVICES:  Medical Necessity Denial & Uoti-xt-Rpnb Review  Phone: [phone]  Fax: [phone]  Email: [email or web address]     DISCHARGE SUPPORT TEAM:  For Patients Discharge Needs & Updates  Phone: [phone] opt. 2 Fax: [phone]  Email: [email or web address]

## 2024-08-07 NOTE — ASSESSMENT & PLAN NOTE
Zoloft discontinued in the setting of recent seizure event and MRI findings  Avoid SSRIs at this time  Avoid medications that lowers seizure threshold  Recommend follow-up with psychiatry to discuss other options with regards to management of patient's postpartum depression

## 2024-08-07 NOTE — PROGRESS NOTES
Progress Note - OB/GYN  Ana Banuelos 23 y.o. female MRN: [id number]  Unit/Bed#:  421-01 Encounter: [id number]    Assessment and Plan:  Ana Banuelos is a patient of: [facility] (Dr. Resendiz & Shala). She is POD# 10 s/p  primary  section, low transverse incision for category 2 FHT currently readmitted in the setting of eclampsia, HD#2.  Recovering well and is stable.  By problem:    * Eclampsia  Assessment & Plan  Witnessed tonic-clonic activity ( @0400) with post-ictal state & non-sustained severe-range BP on presentation, consistent with eclampsia  - Now s/p 24 hr of Magnesium for seizure prophylaxis  - CMP notable for MANDY & hypokalemia (see problem list), otherwise wnl  - CBC wnl  - : Labetalol 20/40 mg IV  - CT head wnl  - MRI w/ vasogenic edema (neurology team believes this is likely secondary to HTN)  - Keppra started  (2 g IV loading dose followed by 750 mg BID PO per neurology's recommendations)    Systolic (12hrs), Av , Min:108 , Max:144   Diastolic (12hrs), Av, Min:63, Max:85    Vitals q4h  Monitor for signs/symptoms of worsening eclampsia  Monitor for at least 24 hr s/p discontinuation of magnesium  Follow up BMP & CBC 8/8 am in s/o MANDY & hypokalemia    Vasogenic brain edema (HCC)  Assessment & Plan  Noted on MRI  Reviewed finding w/ neurology team who suspect this is secondary to hypertension.  Keppra started on   Magnesium discontinued on  s/p 24 hr    Neurology consulted, appreciate recommendations:  Neurology to assess patient 8/8 am  Continue Keppra as above  Likely repeat MRI in several days pending neurology's recommendations to see if vasogenic edema is improving with improved BP  Goal -160 mmHg    Status post primary low transverse  section  Assessment & Plan  S/p 1LTCS on  for cat II  Not breastfeeding  No pain  Minimal lochia    Continue routine postpartum care    Hypokalemia  Assessment & Plan  3.6 -> Kdur 40 mEq      Trend K+ on am BMP     MANDY (acute kidney injury) ([facility])  Assessment & Plan  Cr 0.6 (baseline) -> 1.34 (admission) -> 1.18 ()  S/p Magnesium (4g bolus, 1g/hr maintenance)    Trend Cr on am BMP     Postpartum depression  Assessment & Plan  EPDS 16  Started on Zoloft at a postpartum problem visit prior to current admission    Continue home Zoloft    Anemia  Assessment & Plan  Hgb prior to d/c after delivery 11.5  Admission Hgb 14.0, likely hemoconcentrated  Last Hgb  13.7        Disposition    - Anticipate discharge home on HD#3      Subjective/Objective     Chief Complaint: Postpartum State     Subjective:    Ana Banuelos is POD# 10 s/p  primary  section, low transverse incision for category 2 FHT currently readmitted in the setting of eclampsia, HD#2. She has no current complaints and no pain.  She is voiding, having bowel movements, and tolerating PO.  She is not breastfeeding.  Currently she denies headache, vision changes, chest pain, SOB, RUQ/epigastric pain, or increased swelling.  We reviewed plan for neurology consult this morning and discontinuation of Magnesium with continuation of Keppra, and she was agreeable with the plan as stated.    Vitals:   /73 (BP Location: Left arm)   Pulse 97   Temp 98.6 °F (37 °C) (Oral)   Resp 20   LMP 10/19/2023 (Exact Date)   SpO2 96%   Breastfeeding No       Intake/Output Summary (Last 24 hours) at 2024 0707  Last data filed at 2024 0028  Gross per 24 hour   Intake 848.33 ml   Output 2600 ml   Net -1751.67 ml       Invasive Devices       Peripheral Intravenous Line  Duration             Peripheral IV 24 Left;Ventral (anterior) Forearm 1 day    Peripheral IV 24 Right Antecubital 1 day              Intrauterine Pressure Catheter  Duration             Intrauterine Pressure Catheter 24 10 days                    Physical Exam:   GEN: Ana Banuelos appears well, alert and oriented x 3, pleasant and  cooperative   CARDIO: RRR, no murmurs or rubs  RESP:  CTAB, no wheezes or rales  ABDOMEN: soft, no tenderness, no distention, incision C/D/I  EXTREMITIES: SCDs on, non tender, no erythema, b/l Driss's sign negative      Labs:     Hemoglobin   Date Value Ref Range Status   08/07/2024 13.7 11.5 - 15.4 g/dL Final   08/07/2024 13.1 11.5 - 15.4 g/dL Final     WBC   Date Value Ref Range Status   08/07/2024 9.02 4.31 - 10.16 Thousand/uL Final   08/07/2024 9.13 4.31 - 10.16 Thousand/uL Final     Platelets   Date Value Ref Range Status   08/07/2024 276 149 - 390 Thousands/uL Final   08/07/2024 263 149 - 390 Thousands/uL Final     Creatinine   Date Value Ref Range Status   08/07/2024 1.18 0.60 - 1.30 mg/dL Final     Comment:     Standardized to IDMS reference method   08/07/2024 1.20 0.60 - 1.30 mg/dL Final     Comment:     Standardized to IDMS reference method   07/17/2023 0.69 0.60 - 1.30 mg/dL Final     AST   Date Value Ref Range Status   08/07/2024 11 (L) 13 - 39 U/L Final   08/07/2024 11 (L) 13 - 39 U/L Final   07/17/2023 14 13 - 39 IU/L Final   03/23/2022 13 <41 U/L Final   02/22/2022 16 <41 U/L Final     ALT   Date Value Ref Range Status   08/07/2024 10 7 - 52 U/L Final     Comment:     Specimen collection should occur prior to Sulfasalazine administration due to the potential for falsely depressed results.    08/07/2024 9 7 - 52 U/L Final     Comment:     Specimen collection should occur prior to Sulfasalazine administration due to the potential for falsely depressed results.    07/17/2023 6 (L) 7 - 52 IU/L Final   03/23/2022 19 <56 U/L Final   02/22/2022 15 <56 U/L Final          Renay Hale MD  8/7/2024  7:07 AM

## 2024-08-07 NOTE — ASSESSMENT & PLAN NOTE
23 y.o. female with asthma and severe postpartum depression and who is s/p emergent C section on 7/28/24 who presented to the ED on 8/6/24 following witnessed nocturnal seizure with postictal state at 0400AM. /101 on arrival. The evening prior, patient reported taking her first dose of Zoloft and an hour later gradually developing severe holocephalic headache with associated dark spots in her vision, nausea and vomiting. Patient was given Labetalol, Mg and Keppra upon arrival. MRI revealed findings concerning for PRES vs RCVS as detailed below.    Neurodiagnostics:  MRI brain wo:   Multiple gyriform areas of vasogenic edema involving the cortex and subcortical white matter of the right greater than left frontal lobes and parietal lobes, and involving the parafalcine bilateral occipital lobes, which likely represent a posterior reversible encephalopathy syndrome or alternatively reversible cerebrovascular constriction syndrome/ (RCVS), or less likely sequela of recent seizures.  Punctate foci of diffusion restriction involving the right frontal lobe, and right parietal lobe, in the areas of cortical/subcortical vasogenic edema, which may represent T2 shine through, or alternatively punctate subacute infarcts in these regions.  No significant mass effect, midline shift noted. No intracranial hemorrhage identified.  MRI brain with: No abnormal parenchymal or extra-axial enhancement.   TSH WNL    Case discussed in detail with Attending Neurologist. MRI findings reviewed with no evidence of pathologic enhancement. Suspect radiologic findings and new onset provoked seizure to be secondary to cerebrovascular dysregulation syndrome in the postpartum state consistent with eclampsia, unclear of the association with Zoloft. Would recommend continuing Keppra at this time. Will repeat MRI brain w wo in 1 week. Would expect radiologic improvement if due to cerebrovascular dysregulation syndrome. At this time no driving.  Patient to follow-up with outpatient epileptology.      Plan:  S/p Keppra 2g load   Continue Keppra 750mg q12hrs for now  Please provide 1 month supply with 1 refill  S/p Acetaminophen, Reglan, IV Mg  Labetalol 20mg x 1, Labetalol 40mg x 1  Ativan prn for motor seizure >2minutes  Avoid Zoloft for now  Reasonable to trial Lexapro  Avoid meds that lower seizure threshold including Wellbutrin  Strict goal of normotension. Avoid hypertension.   Close BP monitoring after discharge  Will hold off on EEG at this time.  Plan to repeat MRI brain w wo outpatient in 1 week - Neurology to follow-up with results  Tele  Seizure precautions  [employer] form completed and faxed. Patient is aware that she is not to drive at this time.

## 2024-08-07 NOTE — ASSESSMENT & PLAN NOTE
As noted on MRI. See findings above.  No pathologic enhancement on MRI brain with  Plan for outpatient repeat MRI brain w wo in 1 week

## 2024-08-07 NOTE — ASSESSMENT & PLAN NOTE
MRI brain concerning for PRES vs RCVS vs sequelae of recent seizures with possible R frontal and R parietal punctate infarcts in the areas of vasogenic edema vs artifact.   See above for full impression and recommendations, including repeat MRI brain w wo in 1 week

## 2024-08-07 NOTE — ASSESSMENT & PLAN NOTE
S/p 1LTCS on 7/28 for cat II  Not breastfeeding  No pain  Minimal lochia    Continue routine postpartum care

## 2024-08-07 NOTE — CONSULTS
Consultation - Neurology   Ana Tierney Banuelos 23 y.o. female MRN: [id number]  Unit/Bed#: -01 Encounter: [id number]      Assessment & Plan     New onset seizure (HCC)  Assessment & Plan  23 y.o. female with asthma and severe postpartum depression and who is s/p emergent C section on 7/28/24 who presented to the ED on 8/6/24 following witnessed nocturnal seizure with postictal state at 0400AM.  The evening prior, patient reported taking her first dose of Zoloft and an hour later gradually developing severe holocephalic headache with associated dark spots in her vision, nausea and vomiting. BP elevated in the ED. Patient was given Labetalol, Mg and Keppra upon arrival. MRI revealed findings concerning for PRES vs RCVS as detailed below.  Additional recommendation as detailed in attestation by attending neurologist.    Neurodiagnostics:  MRI brain wo:   Multiple gyriform areas of vasogenic edema involving the cortex and subcortical white matter of the right greater than left frontal lobes and parietal lobes, and involving the parafalcine bilateral occipital lobes, which likely represent a posterior reversible encephalopathy syndrome or alternatively reversible cerebrovascular constriction syndrome/ (RCVS), or less likely sequela of recent seizures.  Punctate foci of diffusion restriction involving the right frontal lobe, and right parietal lobe, in the areas of cortical/subcortical vasogenic edema, which may represent T2 shine through, or alternatively punctate subacute infarcts in these regions.  No significant mass effect, midline shift noted. No intracranial hemorrhage identified.    MRI findings concerning for PRES/RCVS. This case was discussed in detail with Dr. David. Etiology of MRI findings unclear at this time; however, based on patient's report and our chart review it does not appear that she has had long standing hypertension, nor does she have other concerning comorbidities, nor is taking  immunosuppressants. It is; however, interesting that she took her first dose of Zoloft right before all of her symptoms began. While it is unusual for these type of MRI findings to happen that quickly and patient does not really describe abrupt onset headache like we classically see with RCVS, Zoloft has been associated with RCVS and it is known that RCVS risk increases during peripartum state.   At this time would recommend obtaining MRI brain with (patient has a 5:45pm time slot), and avoiding SSRIs (as well as Wellbutrin as this is known to decrease seizure threshold) for the time being.   The patient should remain on Keppra 750 mg twice daily for now and a repeat MRI brain w wo will be ordered to be completed in one week.    Plan:  S/p Keppra 2g load   Continue Keppra 750mg q12hrs for now  Please provide 1 month supply with 1 refill  S/p Acetaminophen, Reglan, IV Mg  Labetalol 20mg x 1, Labetalol 40mg x 1  Ativan prn for motor seizure >2minutes  Avoid SSRIs for now  Recommend additional conversation with psychiatry about the possibility that another class of medication that may help the patient with her depression   Avoid meds that lower seizure threshold including Wellbutrin  Strict goal of normotension. Avoid hypertension.   Close BP monitoring after discharge  Will hold off on EEG at this time.  Obtain MRI brain with contrast now  Plan to repeat MRI brain w wo outpatient in 1 week  Tele  TSH pending  Seizure precautions  [employer] form completed and faxed. Patient is aware that she is not to drive.     Abnormal finding on MRI of brain  Assessment & Plan  MRI brain concerning for PRES vs RCVS vs sequelae of recent seizures with possible R frontal and R parietal punctate infarcts in the areas of vasogenic edema vs artifact.   See above for full impression  Work-up ongoing as detailed above    Vasogenic brain edema (HCC)  Assessment & Plan  As noted on MRI. See findings above.  Workup as detailed above  MRI brain  w pending  Plan for outpatient repeat MRI brain w wo in 1 week    Postpartum depression  Assessment & Plan  Zoloft discontinued in the setting of recent seizure event and MRI findings  Avoid SSRIs at this time  Avoid medications that lowers seizure threshold  Recommend follow-up with psychiatry to discuss other options with regards to management of patient's postpartum depression         Ana Banuelos will need follow up in in 4 weeks with epilepsy attending. She will require a repeat MRI within 1 week.    History of Present Illness     Reason for Consult / Principal Problem: Vasogenic edema  Hx and PE limited by: NA  HPI: Ana Banuelos is a 23 y.o. left handed female with asthma and severe postpartum depression and who is s/p emergent C section on 7/28/24 due to Cat 2 (bad tracing) who presented to the ED on 8/6/24 following witnessed seizure like activity during sleep with postictal state at 0400AM.     Patient explained that she was doing well and at her baseline when, on 8/5 evening she took her first dose of Zoloft and later developed a headache, described as holocephalic and severe, and associated dark spots in her vision. She took Ibuprofen and subsequently vomited. She went to be despite her persistent headache. Around 0400 patient's  awoke to the patient shaking, gurgling, and seeming to have difficulty breathing. Shaking abated spontaneously after a couple of minutes but according to , patient was reported to have fluctuating consciousness after that point and was unresponsive. She finally began to improve as EMS arrived. +tongue bite. No incontinence. Patient is amnestic to the event up until EMS evaluation.   In the ED patient continued with N/V, but denied HA, vision changes or focal neurologic deficits.   /101. IV Labetalol and Mg given.  Keppra 2g bolus given and patient started on Keppra 750mg q12hrs.  Initial labs notable for , uric acid 9.3. CBC/CMP bland. UA  negative.  CTH negative for acute abnormality.   MRI brain concerning for PRES vs RCVS vs sequelae of recent seizures with possible R frontal and R parietal punctate infarcts in the areas of vasogenic edema vs artifact. Full read above.    /79 this morning. On evaluation, patient reports she still feels a bit fuzzy and a bit wobbly, but aside from this she is back to baseline. She no longer has headache, nausea or vomiting.  Patient and her  expressed concern that the above event happened after her first dose of Zoloft.  They are concerned about continuing to take this medication.  Patient has no personal or family history of seizures, she denies recent head trauma or infection and she denies alcohol or drug use.  In discussing patient's postpartum depression, she rates her symptoms at about a 6 or a 7.  Currently her mother, who lives out of town, is staying with her and her  to help.    Inpatient consult to Neurology  Consult performed by: Denisse Phan PA-C  Consult ordered by: Renay Hale MD          Review of Systems   Constitutional:  Positive for fatigue.   Respiratory:  Negative for shortness of breath.    Cardiovascular:  Negative for chest pain.   Gastrointestinal:  Negative for nausea and vomiting.   Musculoskeletal:  Positive for gait problem (wobbly at times).   Neurological:  Positive for seizures (8/6AM). Negative for facial asymmetry, speech difficulty, weakness, numbness and headaches (8/5PM, now resolved).   Psychiatric/Behavioral:  Positive for dysphoric mood.     A 12 point ROS was completed and other than the above mentioned symptoms and those noted in the HPI and patient reporting a sense of fuzziness and being a bit wobbly, all remaining systems were negative.       Historical Information   Past Medical History:   Diagnosis Date    Asthma     childhood     Past Surgical History:   Procedure Laterality Date    DE  DELIVERY ONLY N/A 2024    Procedure:   SECTION ();  Surgeon: Seema Last MD;  [facility];  Service: Obstetrics     Social History   Social History     Substance and Sexual Activity   Alcohol Use Not Currently     Social History     Substance and Sexual Activity   Drug Use Never     E-Cigarette/Vaping    E-Cigarette Use Never User      E-Cigarette/Vaping Substances     Social History     Tobacco Use   Smoking Status Never   Smokeless Tobacco Never     Family History:   Family History   Problem Relation Age of Onset    Cancer Mother         Cervical    Asthma Paternal Grandmother     Diabetes Paternal Grandmother     Hypertension Paternal Grandmother     Stroke Paternal Grandfather 80    Deep vein thrombosis Neg Hx     Pulmonary embolism Neg Hx        Review of previous medical records was completed.     Meds/Allergies   all current active meds have been reviewed    No Known Allergies    Objective   Vitals:Blood pressure 130/76, pulse 70, temperature 98.3 °F (36.8 °C), resp. rate 18, last menstrual period 10/19/2023, SpO2 96%, not currently breastfeeding.,There is no height or weight on file to calculate BMI.    Intake/Output Summary (Last 24 hours) at 2024 191  Last data filed at 2024 0700  Gross per 24 hour   Intake 848.33 ml   Output 1000 ml   Net -151.67 ml       Invasive Devices:   Invasive Devices       Peripheral Intravenous Line  Duration             Peripheral IV 24 Right Antecubital 1 day              Intrauterine Pressure Catheter  Duration             Intrauterine Pressure Catheter 24 10 days                    Physical Exam  Constitutional:       General: She is not in acute distress.     Appearance: She is not ill-appearing or toxic-appearing.   HENT:      Head: Normocephalic and atraumatic.      Mouth/Throat:      Comments: Left lateral tongue bite  Eyes:      Extraocular Movements: EOM normal.   Pulmonary:      Effort: No respiratory distress.   Neurological:      Mental Status: She is  alert and oriented to person, place, and time.      Motor: Motor strength is normal.     Coordination: Finger-Nose-Finger Test normal.   Psychiatric:         Speech: Speech normal.       Neurologic Exam     Mental Status   Oriented to person, place, and time.   Follows 2 step commands.   Attention: normal. Concentration: normal.   Speech: speech is normal (No dysarthria or aphasia)  Level of consciousness: alert  Knowledge: good.   Normal comprehension.     Cranial Nerves     CN II   Visual acuity: normal (grossly)  Right visual field deficit: none  Left visual field deficit: none     CN III, IV, VI   Extraocular motions are normal.   Nystagmus: none   Conjugate gaze: present    CN V   Facial sensation intact.     CN VII   Facial expression full, symmetric.     CN VIII   Hearing: intact    CN XII   Tongue deviation: none    Motor Exam   Muscle bulk: normal  Overall muscle tone: normal  Right arm pronator drift: absent  Left arm pronator drift: absent    Strength   Strength 5/5 throughout.     Sensory Exam   Light touch normal.     Gait, Coordination, and Reflexes     Coordination   Finger to nose coordination: normal    Reflexes   Right plantar: normal  Left plantar: normal  DTRs brisk throughout, symmetric       Lab Results: I have personally reviewed pertinent reports.    Imaging Studies: I have personally reviewed pertinent films in PACS CTH, MRI brain   EKG, Pathology, and Other Studies: I have personally reviewed pertinent reports.    VTE Prophylaxis: Sequential compression device (Venodyne)     Code Status: Prior

## 2024-08-07 NOTE — UTILIZATION REVIEW
"Initial Clinical Review    Admission: Date/Time/Statement:   Admission Orders (From admission, onward)       Ordered        24 0550  INPATIENT ADMISSION  Once                          Orders Placed This Encounter   Procedures    INPATIENT ADMISSION     Standing Status:   Standing     Number of Occurrences:   1     Order Specific Question:   Level of Care     Answer:   Med Surg [[id number]]     Order Specific Question:   Estimated length of stay     Answer:   More than 2 Midnights     Order Specific Question:   Certification     Answer:   I certify that inpatient services are medically necessary for this patient for a duration of greater than two midnights. See H&P and MD Progress Notes for additional information about the patient's course of treatment.     ED Arrival Information       Expected   -    Arrival   2024 04:57    Acuity   Emergent              Means of arrival   Ambulance    Escorted by   [facility]    Service   OB/GYN    Admission type   Elective              Arrival complaint   seizure             Chief Complaint   Patient presents with    Seizure - New Onset     BIBA-  witnessed tonic clonic movements and \"foaming at mouth\" at approx 0410. Pt with emergent  x7 days ago. Pt c/o N/V.       Initial Presentation: 23 y.o. female HX post partum depression  PPD9 s/p 1LTCS (cat 2 FHT)  presents as Inpatient admission with concern for eclamptic seizure, MANDY.     History from patient and her  at bedside. Patient reports  she took her first dose of Zoloft last night then developed a headache shortly thereafter; she thought the headache was due to the Zoloft so she took some ibuprofen but subsequently vomited it up. Reports HA as severe, encasing her whole head, and was associated with dark spots in her vision. Patient went to bed.   reports witnessed tonic clonic activity/ w post ictal state-that around 0400 he was woken up to her shaking against him. Hearing " "\"gurgling\" coming from her mouth and thought she was having difficulty breathing. He states that the shaking subsided on its own within a couple of minutes, but immediately thereafter Ana seemed to have fluctuating consciousness, not responsive to him.   Patient w no recollection of this time or of getting into the ambulance     EXAM  Given IV Labetalol 20/40 in ED for SR BP measurement; IV MAG bolus in ED, first BP in Triage SR,  labs w MANDY  Cont  IV MAG GTT for 24 HR, monitor sympts, Q 6HR labs, acutely treat BP PRN. Monitor kidney function; cont PTA Zoloft, consult Behavioral Health, obtain CTH, MRi; Initiate Keppra (2 g IV once followed by 750 mg BID PO   Behavioral Health Provider  The option to continue with Zoloft versus changing to another antidepressant were discussed with the patient for treatment of her postpartum depression. She chooses to continue the Zoloft 50 mg p.o. daily so no change in medication is recommended at this time. Cont OP Psych follow up   Date: 2024   Day 2:   Inpatient admission due to eclampsia   denies headache, vision changes, chest pain, SOB, RUQ/epigastric pain, or increased swelling Neuro consult today; DC IV MAG today plan for cont KEPPRA    MRI w/ vasogenic edema (neurology team believes this is likely secondary to HTN)  Keppra started  (2 g IV loading dose followed by 750 mg BID PO per neurology's recommendations)  Systolic (12hrs), Av , Min:108 , Max:144   Diastolic (12hrs), Av, Min:63, Max:85  Vitals q4h  Monitor for signs/symptoms of worsening eclampsia  Monitor for at least 24 hr s/p discontinuation of magnesium  Follow up BMP & CBC 8/8 am in s/o MANDY & hypokalemia    ED Triage Vitals   Temperature Pulse Respirations Blood Pressure SpO2 Pain Score   24 0504 24 0501 24 0501 24 0501 24 0501 24 0754   98.7 °F (37.1 °C) 82 16 (!) 173/101 98 % No Pain     Weight (last 2 days)       None            Vital Signs (last 3 days)  "      Date/Time Temp Pulse Resp BP MAP (mmHg) SpO2 O2 Device Cardiac (WDL) Patient Position - Orthostatic VS Pain    08/07/24 0700 98.9 °F (37.2 °C) 88 18 131/79 -- -- -- -- -- --    08/07/24 0554 -- -- -- -- -- -- -- -- -- No Pain    08/07/24 0530 98.6 °F (37 °C) 97 20 120/73 -- 96 % None (Room air) -- Lying --    08/07/24 0330 -- 90 16 108/63 -- 95 % None (Room air) -- Lying --    08/07/24 0130 98.9 °F (37.2 °C) 85 16 137/80 -- 94 % None (Room air) -- Lying --    08/07/24 0027 -- -- -- -- -- -- None (Room air) WDL -- No Pain    08/06/24 2330 -- 88 16 132/78 -- 95 % None (Room air) -- Lying No Pain    08/06/24 2130 -- 85 16 137/85 -- 95 % None (Room air) -- Lying --    08/06/24 1930 -- 92 18 144/83 -- -- -- -- Lying --    08/06/24 1730 -- 80 18 139/90 105 95 % None (Room air) -- -- --    08/06/24 1536 -- 84 -- -- -- 98 % -- -- -- --    08/06/24 1533 98.2 °F (36.8 °C) 90 18 129/85 -- -- -- -- -- No Pain    08/06/24 1531 -- 82 -- -- -- 96 % None (Room air) -- -- --    08/06/24 1516 -- 89 -- -- -- 98 % -- -- -- --    08/06/24 1511 -- 84 -- -- -- 98 % -- -- -- --    08/06/24 1506 -- 75 -- -- -- 95 % -- -- -- --    08/06/24 1501 -- 69 -- -- -- 95 % -- -- -- --    08/06/24 1456 -- 67 -- -- -- 96 % -- -- -- --    08/06/24 1451 -- 94 -- -- -- 97 % -- -- -- --    08/06/24 1446 -- 69 -- -- -- 95 % -- -- -- --    08/06/24 1441 -- 67 -- -- -- 95 % -- -- -- --    08/06/24 1436 -- 69 -- -- -- 95 % -- -- -- --    08/06/24 1433 -- 67 -- 136/80 -- -- -- -- -- --    08/06/24 1431 -- 75 -- -- -- 95 % -- -- -- --    08/06/24 1426 -- 73 -- -- -- 96 % -- -- -- --    08/06/24 1420 -- 72 -- -- -- 95 % -- -- -- --    08/06/24 1416 -- 72 -- -- -- 95 % -- -- -- --    08/06/24 1410 -- 69 -- -- -- 96 % -- -- -- --    08/06/24 1406 -- 71 -- -- -- 95 % -- -- -- --    08/06/24 1401 -- 66 -- -- -- 96 % -- -- -- --    08/06/24 1356 -- 66 -- -- -- 95 % -- -- -- --    08/06/24 1351 -- 63 -- -- -- 96 % -- -- -- --    08/06/24 1346 -- 62 -- -- -- 95 %  -- -- -- --    08/06/24 1340 -- 63 -- -- -- 95 % -- -- -- --    08/06/24 1336 -- 62 -- -- -- 96 % -- -- -- --    08/06/24 1333 -- 70 -- -- -- -- -- -- -- --    08/06/24 1331 -- 69 18 131/85 -- 96 % None (Room air) -- -- --    08/06/24 1325 -- 76 -- -- -- 96 % -- -- -- --    08/06/24 1321 -- 66 -- -- -- 96 % -- -- -- --    08/06/24 1206 -- -- -- -- -- -- -- -- -- Med Not Given for Pain - for MAR use only    08/06/24 1205 -- 84 -- -- -- 97 % -- -- -- --    08/06/24 1200 -- 72 -- -- -- 94 % -- -- -- --    08/06/24 1155 -- 68 -- -- -- 94 % -- -- -- --    08/06/24 1150 -- 66 -- -- -- 94 % -- -- -- --    08/06/24 1145 -- 65 -- -- -- 94 % -- -- -- --    08/06/24 1140 -- 67 -- -- -- 95 % -- -- -- --    08/06/24 1135 -- 62 -- -- -- 93 % -- -- -- --    08/06/24 1132 -- 77 -- 133/81 -- -- -- -- -- --    08/06/24 1130 -- 71 -- -- -- 95 % None (Room air) -- -- --    08/06/24 1125 -- 70 -- -- -- 95 % -- -- -- --    08/06/24 1120 -- 71 -- -- -- 96 % -- -- -- --    08/06/24 1115 -- 69 -- -- -- 97 % -- -- -- --    08/06/24 1110 98.3 °F (36.8 °C) 67 -- -- -- 96 % -- -- -- No Pain    08/06/24 1107 -- 71 -- -- -- -- -- -- -- --    08/06/24 1105 -- 84 -- 137/92 -- 93 % -- -- -- --    08/06/24 1043 -- 66 -- -- -- 96 % -- -- -- --    08/06/24 1038 -- 66 -- -- -- 97 % -- -- -- --    08/06/24 1037 -- 63 -- 131/81 -- -- -- -- -- --    08/06/24 1033 -- 72 -- -- -- 95 % -- -- -- --    08/06/24 1028 -- 77 -- -- -- 97 % -- -- -- --    08/06/24 1023 -- 69 -- -- -- 96 % -- -- -- --    08/06/24 1018 -- 77 -- -- -- 95 % -- -- -- --    08/06/24 1013 -- 69 -- -- -- 95 % -- -- -- --    08/06/24 1008 -- 67 -- -- -- 95 % -- -- -- --    08/06/24 1007 -- 66 -- 138/83 -- -- -- -- -- --    08/06/24 1003 -- 69 -- -- -- 95 % -- -- -- --    08/06/24 0958 -- 66 -- -- -- 95 % -- -- -- --    08/06/24 0953 -- 67 -- -- -- 95 % -- -- -- --    08/06/24 0948 -- 67 -- -- -- 96 % -- -- -- --    08/06/24 0943 -- 66 -- -- -- 97 % -- -- -- --    08/06/24 0938 -- 67 -- -- --  96 % -- -- -- --    08/06/24 0936 -- 63 19 135/84 -- -- -- -- -- --    08/06/24 0933 -- 72 -- -- -- 97 % None (Room air) -- -- --    08/06/24 0915 -- 73 -- -- -- -- -- -- -- --    08/06/24 0913 -- 71 -- 130/78 -- 94 % -- -- -- --    08/06/24 0908 -- 69 -- -- -- 94 % -- -- -- --    08/06/24 0903 -- 70 -- -- -- 95 % -- -- -- --    08/06/24 0900 -- 65 -- 129/73 -- -- -- -- -- --    08/06/24 0858 -- 68 -- -- -- 95 % -- -- -- --    08/06/24 0853 -- 70 -- -- -- 94 % -- -- -- --    08/06/24 0848 -- 70 -- -- -- 94 % -- -- -- --    08/06/24 0843 -- 69 -- 134/78 -- 95 % -- -- -- --    08/06/24 0838 -- 67 -- -- -- 95 % -- -- -- --    08/06/24 0833 -- 69 -- 135/76 -- 95 % -- -- -- --    08/06/24 0828 -- 68 -- -- -- 93 % -- -- -- --    08/06/24 0824 -- 67 -- 130/68 -- -- -- -- -- --    08/06/24 0823 -- 69 -- -- -- 94 % -- -- -- --    08/06/24 0818 -- 66 -- -- -- 93 % -- -- -- --    08/06/24 0813 -- 63 -- 146/82 -- 93 % -- -- -- --    08/06/24 0808 -- 69 -- -- -- 94 % -- -- -- --    08/06/24 0804 -- 63 -- 147/79 -- -- -- -- -- --    08/06/24 0803 -- 65 -- -- -- 94 % -- -- -- --    08/06/24 0800 -- 66 -- -- -- 94 % -- -- -- --    08/06/24 0754 -- 64 -- 155/79 -- -- -- -- -- No Pain    08/06/24 0753 -- 66 -- -- -- 94 % -- -- -- --    08/06/24 0748 -- 65 -- -- -- 93 % -- -- -- --    08/06/24 0745 -- 66 -- -- -- 93 % -- -- -- --    08/06/24 0738 -- 65 -- -- -- 95 % -- -- -- --    08/06/24 0733 -- 64 -- -- -- 95 % -- -- -- --    08/06/24 0730 -- 65 -- 161/90 -- -- -- -- -- --    08/06/24 0728 -- 82 -- -- -- 97 % -- -- -- --    08/06/24 0718 -- 68 19 165/94 -- 95 % -- -- Lying --    08/06/24 0715 98.3 °F (36.8 °C) -- -- -- -- -- -- -- -- --    08/06/24 0645 -- 71 17 146/84 109 94 % None (Room air) -- Lying --    08/06/24 0629 -- 72 17 159/92 -- 95 % -- -- -- --    08/06/24 0608 -- -- -- -- -- -- None (Room air) -- -- --    08/06/24 0600 -- 80 17 176/94  124 94 % None (Room air) -- Lying --    08/06/24 0545 -- 80 18 166/94 124 94 % --  -- -- --    08/06/24 0530 -- 119 21 136/78 100 94 % None (Room air) -- Sitting --    08/06/24 0523 -- 116 18 153/89 -- -- -- -- Sitting --    08/06/24 0504 98.7 °F (37.1 °C) -- -- -- -- -- -- -- -- --    08/06/24 0501 -- 82 16 173/101 -- 98 % None (Room air) -- Lying --              Pertinent Labs/Diagnostic Test Results:   Radiology:  MRI brain wo contrast   Final Interpretation by Brock Ravi MD (08/06 1340)      Multiple gyriform areas of vasogenic edema involving the cortex and subcortical white matter of the right greater than left frontal lobes and parietal lobes, and involving the parafalcine bilateral occipital lobes, which likely represent a posterior    reversible encephalopathy syndrome or alternatively reversible cerebrovascular constriction syndrome/ (RCVS), or less likely sequela of recent seizures.      Punctate foci of diffusion restriction involving the right frontal lobe, and right parietal lobe, in the areas of cortical/subcortical vasogenic edema, which may represent T2 shine through, or alternatively punctate subacute infarcts in these regions.      No significant mass effect, midline shift noted. No intracranial hemorrhage identified.         I personally discussed this study with Dr. Janette Keene, on 8/6/2024 1:24 PM.            Workstation performed: [id number]         CT head wo contrast   Final Interpretation by E. Alec Schoenberger, MD (08/06 1203)      No acute intracranial abnormality.                  Workstation performed: [id number]           Cardiology:  ECG 12 lead   Final Result by Yesy Hitchcock MD (08/06 0729)   Normal sinus rhythm   Normal ECG   No previous ECGs available   Confirmed by Yesy Hitchcock ([id number]) on 8/6/2024 7:29:43 AM        GI:  No orders to display           Results from last 7 days   Lab Units 08/07/24  0542 08/07/24  0027 08/06/24  1739 08/06/24  1211 08/06/24  0509   WBC Thousand/uL 9.02 9.13 10.21* 8.37 8.30   HEMOGLOBIN g/dL 13.7 13.1 13.4 13.7 14.0  "  HEMATOCRIT % 42.0 39.9 40.7 40.7 43.3   PLATELETS Thousands/uL 276 263 261 272 260   TOTAL NEUT ABS Thousands/µL  --   --   --   --  7.26         Results from last 7 days   Lab Units 08/07/24  0542 08/07/24  0027 08/06/24  1739 08/06/24 1211 08/06/24  0509   SODIUM mmol/L 136 136 138 138 138   POTASSIUM mmol/L 3.6 3.5 3.7 4.1 5.3   CHLORIDE mmol/L 99 100 102 104 103   CO2 mmol/L 27 26 26 25 21   ANION GAP mmol/L 10 10 10 9 14*   BUN mg/dL 17 19 21 21 26*   CREATININE mg/dL 1.18 1.20 1.21 1.21 1.34*   EGFR ml/min/1.73sq m 64 62 62 62 55   CALCIUM mg/dL 8.6 8.6 8.7 8.9 9.3   MAGNESIUM mg/dL 7.0* 6.6* 5.8* 4.8* 2.1     Results from last 7 days   Lab Units 08/07/24  0542 08/07/24  0027 08/06/24 1739 08/06/24 1211 08/06/24  0509   AST U/L 11* 11* 12* 11* 18   ALT U/L 10 9 11 11 10   ALK PHOS U/L 60 58 68 68 64   TOTAL PROTEIN g/dL 6.7 6.6 6.7 6.7 7.3   ALBUMIN g/dL 3.7 3.6 3.6 3.6 3.9   TOTAL BILIRUBIN mg/dL 0.69 0.63 0.72 0.69 0.84     Results from last 7 days   Lab Units 08/06/24  0514   POC GLUCOSE mg/dl 131     Results from last 7 days   Lab Units 08/07/24  0542 08/07/24  0027 08/06/24  1739 08/06/24  1211 08/06/24  0509   GLUCOSE RANDOM mg/dL 116 120 103 119 122             No results found for: \"BETA-HYDROXYBUTYRATE\"               Results from last 7 days   Lab Units 08/06/24  0601   CLARITY UA  Clear   COLOR UA  Colorless   SPEC GRAV UA  1.008   PH UA  7.0   GLUCOSE UA mg/dl Negative   KETONES UA mg/dl Negative   BLOOD UA  Trace*   PROTEIN UA mg/dl Trace*   NITRITE UA  Negative   BILIRUBIN UA  Negative   UROBILINOGEN UA (BE) mg/dl <2.0   LEUKOCYTES UA  Negative   WBC UA /hpf 2-4*   RBC UA /hpf 2-4*   BACTERIA UA /hpf Occasional   EPITHELIAL CELLS WET PREP /hpf None Seen   MUCUS THREADS  Occasional*             ED Treatment-Medication Administration from 08/06/2024 0457 to 08/06/2024 0713         Date/Time Order Dose Route Action     08/06/2024 0516 magnesium sulfate 4 g/100 mL IVPB (premix) 4 g 4 g " Intravenous New Bag     08/06/2024 0518 sodium chloride 0.9 % bolus 1,000 mL 1,000 mL Intravenous New Bag     08/06/2024 0613 labetalol (NORMODYNE) injection 20 mg 20 mg Intravenous Given            Past Medical History:   Diagnosis Date    Asthma     childhood     Present on Admission:   Anemia   Postpartum depression      Admitting Diagnosis: Eclampsia [O15.9]  Postpartum depression [F53.0]  Seizure (HCC) [R56.9]  Seizure-like activity (HCC) [R56.9]  Age/Sex: 23 y.o. female  Admission Orders:  SZ precautions  Spot pulse OX  VS, I/O lung assessments and deep tendon reflexes q 2 hrs while on MGSO 4      Scheduled Medications:  acetaminophen, 650 mg, Oral, Q6H LUKE  docusate sodium, 100 mg, Oral, BID  levETIRAcetam, 750 mg, Oral, Q12H LUKE  potassium chloride, 40 mEq, Oral, Once  senna, 1 tablet, Oral, Daily  sertraline, 50 mg, Oral, HS    IV 8/6=  labetalol (NORMODYNE) injection 20 mg  Dose: 20 mg  Freq: Once Route: IV  Start: 08/06/24 0615 End: 08/06/24 0613   Admin Instructions:               0613         labetalol (NORMODYNE) injection 40 mg  Dose: 40 mg  Freq: Once Route: IV  Start: 08/06/24 0730 End: 08/06/24 0743                  IV Bolus 8/6=magnesium sulfate 4 g/100 mL IVPB (premix) 4 g  Dose: 4 g  Freq: Once Route: IV  Last Dose: Stopped (08/06/24 0601)  Start: 08/06/24 0515 End: 08/06/24 0601    Continuous IV Infusions:   magnesium sulfate 20 g/500 mL infusion (premix)  Rate: 25 mL/hr Dose: 1 g/hr  Freq: Continuous Route: IV  Last Dose: 1 g/hr (08/07/24 0406)  Start: 08/06/24 0700 End: 08/07/24 0640    lactated ringers infusion  Rate: 50 mL/hr Dose: 50 mL/hr  Freq: Continuous Route: IV  Last Dose: 50 mL/hr (08/07/24 0029)  Start: 08/06/24 0945 End: 08/07/24 0640     PRN Meds:  aluminum-magnesium hydroxide-simethicone, 15 mL, Oral, Q6H PRN  calcium carbonate, 1,000 mg, Oral, TID PRN  calcium gluconate, 1 g, Intravenous, Once PRN  diphenhydrAMINE, 25 mg, Oral, Q6H PRN  hydrocortisone, 1 Application, Topical,  Daily PRN  ondansetron, 4 mg, Intravenous, Q8H PRN  simethicone, 80 mg, Oral, 4x Daily PRN        IP CONSULT TO PSYCHIATRY  IP CONSULT TO NEUROLOGY    Network Utilization Review Department  ATTENTION: Please call with any questions or concerns to [phone] and carefully listen to the prompts so that you are directed to the right person. All voicemails are confidential.   For Discharge needs, contact Care Management DC Support Team at [phone] opt. 2  Send all requests for admission clinical reviews, approved or denied determinations and any other requests to dedicated fax number below belonging to the campus where the patient is receiving treatment. List of dedicated fax numbers for the Facilities:  FACILITY NAME UR FAX NUMBER   ADMISSION DENIALS (Administrative/Medical Necessity) [phone]   DISCHARGE SUPPORT TEAM (NETWORK) [phone]   PARENT CHILD HEALTH (Maternity/NICU/Pediatrics) [phone]   [facility] [phone]   [facility] [phone]   Replaced by [facility] [phone]   [facility] [phone]   [facility] [phone]   [facility] [phone]   [facility] [phone]   [facility] [phone]   [facility] [phone]   [facility], [facility] [phone]   [facility] [phone]   [facility] [phone]

## 2024-08-08 VITALS
DIASTOLIC BLOOD PRESSURE: 83 MMHG | WEIGHT: 160 LBS | OXYGEN SATURATION: 97 % | SYSTOLIC BLOOD PRESSURE: 132 MMHG | BODY MASS INDEX: 27.31 KG/M2 | TEMPERATURE: 98.5 F | HEART RATE: 62 BPM | HEIGHT: 64 IN | RESPIRATION RATE: 16 BRPM

## 2024-08-08 LAB
ANION GAP SERPL CALCULATED.3IONS-SCNC: 9 MMOL/L (ref 4–13)
BUN SERPL-MCNC: 17 MG/DL (ref 5–25)
CALCIUM SERPL-MCNC: 8.6 MG/DL (ref 8.4–10.2)
CHLORIDE SERPL-SCNC: 101 MMOL/L (ref 96–108)
CO2 SERPL-SCNC: 29 MMOL/L (ref 21–32)
CREAT SERPL-MCNC: 1.16 MG/DL (ref 0.6–1.3)
ERYTHROCYTE [DISTWIDTH] IN BLOOD BY AUTOMATED COUNT: 19.4 % (ref 11.6–15.1)
GFR SERPL CREATININE-BSD FRML MDRD: 65 ML/MIN/1.73SQ M
GLUCOSE SERPL-MCNC: 92 MG/DL (ref 65–140)
HCT VFR BLD AUTO: 43.9 % (ref 34.8–46.1)
HGB BLD-MCNC: 14 G/DL (ref 11.5–15.4)
MCH RBC QN AUTO: 28.3 PG (ref 26.8–34.3)
MCHC RBC AUTO-ENTMCNC: 31.9 G/DL (ref 31.4–37.4)
MCV RBC AUTO: 89 FL (ref 82–98)
PLATELET # BLD AUTO: 266 THOUSANDS/UL (ref 149–390)
PMV BLD AUTO: 9.2 FL (ref 8.9–12.7)
POTASSIUM SERPL-SCNC: 3.9 MMOL/L (ref 3.5–5.3)
RBC # BLD AUTO: 4.95 MILLION/UL (ref 3.81–5.12)
SODIUM SERPL-SCNC: 139 MMOL/L (ref 135–147)
WBC # BLD AUTO: 9.19 THOUSAND/UL (ref 4.31–10.16)

## 2024-08-08 PROCEDURE — 99232 SBSQ HOSP IP/OBS MODERATE 35: CPT | Performed by: PSYCHIATRY & NEUROLOGY

## 2024-08-08 PROCEDURE — 99238 HOSP IP/OBS DSCHRG MGMT 30/<: CPT | Performed by: OBSTETRICS & GYNECOLOGY

## 2024-08-08 PROCEDURE — NC001 PR NO CHARGE: Performed by: OBSTETRICS & GYNECOLOGY

## 2024-08-08 PROCEDURE — 80048 BASIC METABOLIC PNL TOTAL CA: CPT

## 2024-08-08 PROCEDURE — 85027 COMPLETE CBC AUTOMATED: CPT

## 2024-08-08 RX ORDER — ESCITALOPRAM OXALATE 5 MG/1
5 TABLET ORAL
Qty: 30 TABLET | Refills: 2 | Status: SHIPPED | OUTPATIENT
Start: 2024-08-08 | End: 2024-08-08

## 2024-08-08 RX ORDER — LEVETIRACETAM 750 MG/1
750 TABLET ORAL EVERY 12 HOURS SCHEDULED
Qty: 60 TABLET | Refills: 2 | Status: SHIPPED | OUTPATIENT
Start: 2024-08-08 | End: 2024-08-08

## 2024-08-08 RX ORDER — ESCITALOPRAM OXALATE 10 MG/1
5 TABLET ORAL
Status: DISCONTINUED | OUTPATIENT
Start: 2024-08-08 | End: 2024-08-08 | Stop reason: HOSPADM

## 2024-08-08 RX ORDER — ADHESIVE BANDAGE 3/4"
BANDAGE TOPICAL ONCE
Qty: 1 EACH | Refills: 0 | Status: SHIPPED | OUTPATIENT
Start: 2024-08-08 | End: 2024-08-08

## 2024-08-08 RX ORDER — LEVETIRACETAM 750 MG/1
750 TABLET ORAL EVERY 12 HOURS SCHEDULED
Qty: 60 TABLET | Refills: 2 | Status: SHIPPED | OUTPATIENT
Start: 2024-08-08 | End: 2024-09-07

## 2024-08-08 RX ORDER — ESCITALOPRAM OXALATE 5 MG/1
5 TABLET ORAL
Qty: 30 TABLET | Refills: 2 | Status: SHIPPED | OUTPATIENT
Start: 2024-08-08 | End: 2024-09-07

## 2024-08-08 RX ADMIN — LEVETIRACETAM 750 MG: 750 TABLET, FILM COATED ORAL at 09:14

## 2024-08-08 NOTE — PROGRESS NOTES
Patient had several question on morning rounds which are outlined below:    1) Did the MRI with contrast last night show improvement in the edema?  - Called radiology to review this, and they reported that the MRI with contrast was to assess for clots / vasculature and not edema, and therefore it is not definitive for assessing the edema.  Neurology has already recommended repeat MRI outpatient in 1 week, and so the edema will be assessed at that time.    2) Should I continue Keppra?  - Neurology previously recommended yes, though this question was forwarded to them.  They will provide their answer after seeing her today.    3) What should I take instead of Zoloft?  - We reviewed that there are some studies to show Zoloft may be associated with RCVS.  We also reviewed that there is evidence that SSRIs in very high doses (higher than we prescribe for PPD) as well as low doses of serotonin (e.g., in the setting of PPD) can be associated with seizures.  We reviewed that we recommend treating her PPD and that we would not recommend Wellbutrin since that lowers the seizure threshold.  We reviewed that neurology agrees there are no strict contraindications to SSRIs in general, and Ana felt comfortable trying Lexapro.    4) Can I go home today?  - The patient is cleared for discharge as per the OBGYN team.  The neurology team was asked the same question, and they will provide their answer after seeing her today.    Reviewed w/ Dr. Santoyo and Dr. Yossi Hale MD  OBGYN Resident  08/08/24  12:10 PM

## 2024-08-08 NOTE — DISCHARGE SUMMARY
Discharge Summary   Ana Banuelos MRN: [id number]  Unit/Bed#: -01 Encounter: [id number]    Admission Date: 2024     Discharge Date: 24    Admitting attending: Janette Carr MD    Principal Diagnosis:  Eclampsia [O15.9]  Postpartum depression [F53.0]  Seizure (HCC) [R56.9]  Seizure-like activity (HCC) [R56.9]    Procedures: none    Hospital course:  Ana Banuelos is a 24 yo  who initially presented 9 days postpartum from a [facility] for category 2 tracing for new onset of seizure.  She was at home sleeping when her partner woke up and noticed she was having a seizure which was self-limited.  The night before, she had gone to bed with a headache and nausea.  On admission, her presentation was consistent with a post-ictal state.  She had sustained severe range BP which required acute treatment with Labetalol 20 and 40 mg IV, after which her BP improved.  She had no additional seizures, and she required no oral BP medications.  She was treated with 24 hr of magnesium for seizure prophylaxis.  CT head without contrast was normal, and MRI head without contrast showed vasogenic edema.  Neurology was consulted and agreed her clinical course was most consistent with eclampsia and recommended started Keppra to help prevent additional seizures in the setting of the edema on MRI.  Repeat MRI with contrast was normal.  She had an MANDY with creatinine of 1.32 on admission (increased from baseline of 0.6) which improved to 1.16 prior to discharge.  She had hypokalemia with potassium of 3.6 on admission which improved to 3.9 prior to discharge.  Her labs were otherwise normal.  Her Zoloft which she was taking for postpartum depression was discontinued while her symptoms were being worked up due to concern for possible effects of SSRIs on vasogenic edema, and she was subsequently started on Lexapro instead per patient preference, though Zoloft was ultimately deemed not to be associated with her  "seizures.  On day of discharge, she was feeling well and performing all ADLs.  She was discharged home on the postpartum home BP monitoring program with plan for outpatient follow up in 1 week.    Lab Results:   Lab Results   Component Value Date    WBC 9.19 08/08/2024    HGB 14.0 08/08/2024    HCT 43.9 08/08/2024    MCV 89 08/08/2024     08/08/2024     Lab Results   Component Value Date    CALCIUM 8.6 08/08/2024    K 3.9 08/08/2024    CO2 29 08/08/2024     08/08/2024    BUN 17 08/08/2024    CREATININE 1.16 08/08/2024     Lab Results   Component Value Date/Time    POCGLU 131 08/06/2024 05:14 AM     No results found for: \"PTT\"  No results found for: \"INR\", \"PROTIME\"    Complications: none apparent    Condition at discharge: stable     Discharge instructions/Information to patient and family:   See After Visit Summary for information provided to patient and family.      Provisions for Follow-Up Care:  See After Visit Summary  - Outpatient OBGYN follow up scheduled in 1 week    Disposition: See After Visit Summary for discharge disposition information.    Planned Readmission: no    Discharge Medications:  For a complete list of the patient's medications, please refer to her med rec.  - Keppra and Lexapro were her new medications on discharge        Renay Hale MD  8/8/2024  12:40 PM  "

## 2024-08-08 NOTE — UTILIZATION REVIEW
NOTIFICATION OF ADMISSION DISCHARGE   This is a Notification of Discharge from [facility]. Please be advised that this patient has been discharge from our facility. Below you will find the admission and discharge date and time including the patient’s disposition.   UTILIZATION REVIEW CONTACT:  Crystal Matthews  Utilization   Network Utilization Review Department  Phone: [phone] x carefully listen to the prompts. All voicemails are confidential.  Email: [email or web address]     ADMISSION INFORMATION  PRESENTATION DATE: 8/6/2024  4:57 AM  OBERVATION ADMISSION DATE: N/A  INPATIENT ADMISSION DATE: 8/6/24  5:50 AM   DISCHARGE DATE: 8/8/2024  2:12 PM   DISPOSITION:Home/Self Care    Network Utilization Review Department  ATTENTION: Please call with any questions or concerns to [phone] and carefully listen to the prompts so that you are directed to the right person. All voicemails are confidential.   For Discharge needs, contact Care Management DC Support Team at [phone] opt. [phone]  Send all requests for admission clinical reviews, approved or denied determinations and any other requests to dedicated fax number below belonging to the campus where the patient is receiving treatment. List of dedicated fax numbers for the Facilities:  FACILITY NAME UR FAX NUMBER   ADMISSION DENIALS (Administrative/Medical Necessity) [phone]   DISCHARGE SUPPORT TEAM ([facility]) [phone]   PARENT CHILD HEALTH (Maternity/NICU/Pediatrics) [phone]   [facility] [phone]   [facility] [phone]   [facility] [phone]   [facility] [phone]   [facility] [phone]   [facility] [phone]   [facility] [phone]   [facility] [phone]   [facility]  [facility] [phone]   [facility] [phone]   [facility] [phone]   [facility] [phone]

## 2024-08-08 NOTE — DISCHARGE INSTRUCTIONS
"Souleymane Perez,    We provided you a handout titled \"preeclampsia\", though we want to clarify that you have eclampsia, not preeclampsia.  The only difference between them is that when you have seizures, you have eclampsia, not preeclampsia.  We have provided this handout anyway since it includes helpful information of what to look out for when you go home.    Best regards,    Your OBGYN care team  "

## 2024-08-08 NOTE — PROGRESS NOTES
Progress Note - OB/GYN  Ana Banuelos 23 y.o. female MRN: [id number]  Unit/Bed#:  421-01 Encounter: [id number]    Assessment and Plan:  Ana Banuelos is a patient of: [facility] (Dr. Resendiz & Shala). She is POD#11 s/p  primary  section, low transverse incision for category 2 FHT currently readmitted in the setting of eclampsia, HD#3.  Recovering well and is stable.  By problem:     * Eclampsia  Assessment & Plan  Witnessed tonic-clonic activity ( @0400) with post-ictal state & non-sustained severe-range BP on presentation, consistent with eclampsia  - Now s/p 24 hr of Magnesium for seizure prophylaxis  - CMP notable for MANDY & hypokalemia (see problem list), otherwise wnl  - CBC wnl  - : Labetalol 20/40 mg IV  - CT head w/o contrast on  wnl  - MRI head w/o contrast on  w/ vasogenic edema (neurology team believes this is likely secondary to HTN)  - Repeat MRI w/ contrast on  w/ normal findings  - Keppra started  (2 g IV loading dose followed by 750 mg BID PO per neurology's recommendations)    Systolic (12hrs), Av , Min:135 , Max:137   Diastolic (12hrs), Av, Min:75, Max:85    Vitals q4h  Monitor for signs/symptoms of worsening eclampsia  Follow up BMP & CBC 8/8 am in s/o MANDY & hypokalemia (drawn & pending)    Vasogenic brain edema (HCC)  Assessment & Plan  Noted on MRI w/o contrast on   Repeat MRI w/ contrast on  w/ normal findings  Reviewed finding w/ neurology team who suspect this is secondary to hypertension  Keppra started on   Magnesium discontinued on  s/p 24 hr    Neurology consulted, appreciate recommendations:  Continue Keppra as above  Repeat MRI in 1 week outpatient  Goal -160 mmHg    Status post primary low transverse  section  Assessment & Plan  S/p 1LTCS on  for cat II  Not breastfeeding  No pain  Minimal lochia    Continue routine postpartum care    Hypokalemia  Assessment & Plan  3.6 -> Kdur 40 mEq      Trend K+ on am BMP  (drawn & pending)    MANDY (acute kidney injury) ([facility])  Assessment & Plan  Cr 0.6 (baseline) -> 1.34 (admission) -> 1.18 ()  S/p Magnesium (4g bolus, 1g/hr maintenance)    Trend Cr on am BMP  (drawn & pending)    Postpartum depression  Assessment & Plan  EPDS 16  Started on Zoloft at a postpartum problem visit prior to current admission  Home Zoloft held per neurology's recommendations as it can be associated with RCVS  - They also recommend avoiding Wellbutrin which lowers the threshold    Consider psych consult for alternative medication recommendations for PPD    Anemia  Assessment & Plan  Hgb prior to d/c after delivery 11.5  Admission Hgb 14.0, likely hemoconcentrated  Last Hgb  13.7          Subjective/Objective     Subjective:    Ana Banuelos is POD#11 s/p  primary  section, low transverse incision for category 2 FHT currently readmitted in the setting of eclampsia, HD#3. She has no current complaints.  Currently she denies headache, vision changes, chest pain, SOB, RUQ/epigastric pain, or increased swelling.  Her lochia is scant, and she is not breastfeeding.  She has no pain.    Vitals:   /77 (BP Location: Left arm)   Pulse 64   Temp 98.9 °F (37.2 °C) (Temporal)   Resp 16   LMP 10/19/2023 (Exact Date)   SpO2 96%   Breastfeeding No       Intake/Output Summary (Last 24 hours) at 2024 0636  Last data filed at 2024 0700  Gross per 24 hour   Intake --   Output 1000 ml   Net -1000 ml       Invasive Devices       Peripheral Intravenous Line  Duration             Peripheral IV 24 Right Antecubital 2 days              Intrauterine Pressure Catheter  Duration             Intrauterine Pressure Catheter 24 11 days                    Physical Exam:   GEN: Ana Banuelos appears well, alert and oriented x 3, pleasant and cooperative   CARDIO: RRR, no murmurs or rubs  RESP:  CTAB, no wheezes or rales  ABDOMEN: soft, no tenderness,  no distention, Incision C/D/I  EXTREMITIES: SCDs on, non tender, no erythema, b/l Driss's sign negative      Labs:     Hemoglobin   Date Value Ref Range Status   08/08/2024 14.0 11.5 - 15.4 g/dL Final   08/07/2024 13.7 11.5 - 15.4 g/dL Final     WBC   Date Value Ref Range Status   08/08/2024 9.19 4.31 - 10.16 Thousand/uL Final   08/07/2024 9.02 4.31 - 10.16 Thousand/uL Final     Platelets   Date Value Ref Range Status   08/08/2024 266 149 - 390 Thousands/uL Final   08/07/2024 276 149 - 390 Thousands/uL Final     Creatinine   Date Value Ref Range Status   08/07/2024 1.18 0.60 - 1.30 mg/dL Final     Comment:     Standardized to IDMS reference method   08/07/2024 1.20 0.60 - 1.30 mg/dL Final     Comment:     Standardized to IDMS reference method   07/17/2023 0.69 0.60 - 1.30 mg/dL Final     AST   Date Value Ref Range Status   08/07/2024 11 (L) 13 - 39 U/L Final   08/07/2024 11 (L) 13 - 39 U/L Final   07/17/2023 14 13 - 39 IU/L Final   03/23/2022 13 <41 U/L Final   02/22/2022 16 <41 U/L Final     ALT   Date Value Ref Range Status   08/07/2024 10 7 - 52 U/L Final     Comment:     Specimen collection should occur prior to Sulfasalazine administration due to the potential for falsely depressed results.    08/07/2024 9 7 - 52 U/L Final     Comment:     Specimen collection should occur prior to Sulfasalazine administration due to the potential for falsely depressed results.    07/17/2023 6 (L) 7 - 52 IU/L Final   03/23/2022 19 <56 U/L Final   02/22/2022 15 <56 U/L Final          Renay Hale MD  8/8/2024  6:36 AM

## 2024-08-08 NOTE — PROGRESS NOTES
Progress Note - Neurology   Ana Tierney Banuelos 23 y.o. female [id number]  Unit/Bed#: /-01    Assessment/Plan:  New onset seizure (HCC)  Assessment & Plan  23 y.o. female with asthma and severe postpartum depression and who is s/p emergent C section on 7/28/24 who presented to the ED on 8/6/24 following witnessed nocturnal seizure with postictal state at 0400AM. /101 on arrival. The evening prior, patient reported taking her first dose of Zoloft and an hour later gradually developing severe holocephalic headache with associated dark spots in her vision, nausea and vomiting. Patient was given Labetalol, Mg and Keppra upon arrival. MRI revealed findings concerning for PRES vs RCVS as detailed below.    Neurodiagnostics:  MRI brain wo:   Multiple gyriform areas of vasogenic edema involving the cortex and subcortical white matter of the right greater than left frontal lobes and parietal lobes, and involving the parafalcine bilateral occipital lobes, which likely represent a posterior reversible encephalopathy syndrome or alternatively reversible cerebrovascular constriction syndrome/ (RCVS), or less likely sequela of recent seizures.  Punctate foci of diffusion restriction involving the right frontal lobe, and right parietal lobe, in the areas of cortical/subcortical vasogenic edema, which may represent T2 shine through, or alternatively punctate subacute infarcts in these regions.  No significant mass effect, midline shift noted. No intracranial hemorrhage identified.  MRI brain with: No abnormal parenchymal or extra-axial enhancement.   TSH WNL    Case discussed in detail with Attending Neurologist. MRI findings reviewed with no evidence of pathologic enhancement. Suspect radiologic findings and new onset provoked seizure to be secondary to cerebrovascular dysregulation syndrome in the postpartum state consistent with eclampsia, unclear of the association with Zoloft. Would recommend continuing  Keppra at this time. Will repeat MRI brain w wo in 1 week. Would expect radiologic improvement if due to cerebrovascular dysregulation syndrome. At this time no driving. Patient to follow-up with outpatient epileptology.      Plan:  S/p Keppra 2g load   Continue Keppra 750mg q12hrs for now  Please provide 1 month supply with 1 refill  S/p Acetaminophen, Reglan, IV Mg  Labetalol 20mg x 1, Labetalol 40mg x 1  Ativan prn for motor seizure >2minutes  Avoid Zoloft for now  Reasonable to trial Lexapro  Avoid meds that lower seizure threshold including Wellbutrin  Strict goal of normotension. Avoid hypertension.   Close BP monitoring after discharge  Will hold off on EEG at this time.  Plan to repeat MRI brain w wo outpatient in 1 week - Neurology to follow-up with results  Tele  Seizure precautions  [employer] form completed and faxed. Patient is aware that she is not to drive at this time.    Abnormal finding on MRI of brain  Assessment & Plan  MRI brain concerning for PRES vs RCVS vs sequelae of recent seizures with possible R frontal and R parietal punctate infarcts in the areas of vasogenic edema vs artifact.   See above for full impression and recommendations, including repeat MRI brain w wo in 1 week    Vasogenic brain edema (HCC)  Assessment & Plan  As noted on MRI. See findings above.  No pathologic enhancement on MRI brain with  Plan for outpatient repeat MRI brain w wo in 1 week    Postpartum depression  Assessment & Plan  Zoloft discontinued in the setting of recent seizure event and MRI findings  Avoid SSRIs at this time  Avoid medications that lowers seizure threshold  Recommend follow-up with psychiatry to discuss other options with regards to management of patient's postpartum depression      Ana Sharpton will need follow up in in 4 weeks with epilepsy attending. She will require a repeat MRI within 1 week.      Subjective:   VSS. Labs stable and unremarkable.  Patient continues to report that she is  doing well. She does admit to a fuzzy feeling with regards to her thinking but other than this she has no complaints.  She denies headache, visual disturbance, weakness or numbness of an extremity.    Primary OB team did reach out to speak with Dr. David about alternatives to Zoloft with regards to the patient's severe postpartum depression.  Dr. David did express that he is comfortable with them trialing Lexapro.  In conversation with the patient she too is agreeable to this plan.    Past Medical History:   Diagnosis Date    Asthma     childhood     Past Surgical History:   Procedure Laterality Date    NM  DELIVERY ONLY N/A 2024    Procedure:  SECTION ();  Surgeon: Seema Last MD;  Location: [facility];  Service: Obstetrics     Family History   Problem Relation Age of Onset    Cancer Mother         Cervical    Asthma Paternal Grandmother     Diabetes Paternal Grandmother     Hypertension Paternal Grandmother     Stroke Paternal Grandfather 80    Deep vein thrombosis Neg Hx     Pulmonary embolism Neg Hx      Social History     Socioeconomic History    Marital status: /Civil Union     Spouse name: None    Number of children: None    Years of education: None    Highest education level: None   Occupational History    None   Tobacco Use    Smoking status: Never    Smokeless tobacco: Never   Vaping Use    Vaping status: Never Used   Substance and Sexual Activity    Alcohol use: Not Currently    Drug use: Never    Sexual activity: Not Currently     Partners: Male     Birth control/protection: None   Other Topics Concern    None   Social History Narrative    None     Social Determinants of Health     Financial Resource Strain: Not on file   Food Insecurity: No Food Insecurity (2024)    Hunger Vital Sign     Worried About Running Out of Food in the Last Year: Never true     Ran Out of Food in the Last Year: Never true   Transportation Needs: No Transportation Needs  "(7/29/2024)    PRAPARE - Transportation     Lack of Transportation (Medical): No     Lack of Transportation (Non-Medical): No   Physical Activity: Not on file   Stress: Not on file   Social Connections: Unknown (6/18/2024)    Received from [employer]     How often do you feel lonely or isolated from those around you? (Adult - for ages 18 years and over): Not on file   Intimate Partner Violence: Not on file   Housing Stability: Low Risk  (7/29/2024)    Housing Stability Vital Sign     Unable to Pay for Housing in the Last Year: No     Number of Times Moved in the Last Year: 1     Homeless in the Last Year: No       Medications: Reviewed in detail by me.    ROS: Review of Systems A 12 point ROS was completed and other than still feeling a bit \"fuzzy\" in regards to ability to focus and think clearly, all remaining systems were negative.       Vitals: /83   Pulse 62   Temp 98.5 °F (36.9 °C) (Oral)   Resp 16   Ht 5' 4\" (1.626 m)   Wt 72.6 kg (160 lb)   LMP 10/19/2023 (Exact Date)   SpO2 97%   Breastfeeding No   BMI 27.46 kg/m²     Physical Exam:   Physical Exam  Constitutional:       General: She is not in acute distress.     Appearance: She is not ill-appearing or toxic-appearing.   HENT:      Head: Normocephalic and atraumatic.   Pulmonary:      Effort: No respiratory distress.   Neurological:      Mental Status: She is alert and oriented to person, place, and time.   Psychiatric:         Speech: Speech normal.       Neurologic Exam     Mental Status   Oriented to person, place, and time.   Attention: normal. Concentration: normal.   Speech: speech is normal   Level of consciousness: alert  Knowledge: good.   Normal comprehension.     Cranial Nerves     CN III, IV, VI   Conjugate gaze: present    CN VII   Facial expression full, symmetric.     Motor Exam   Moving all extremities antigravity without difficulty and symmetrically     Labs: Reviewed in detail by me.     Imaging: I have " personally reviewed pertinent imaging and PACS reports.     VTE Prophylaxis: None currently

## 2024-08-09 NOTE — UTILIZATION REVIEW
Continued Stay Review    Date: 8/7, 8/8/2024                        Current Patient Class: Inpatient    Current Level of Care: med surg L&D    HPI:23 y.o. female initially admitted on 8/6     Assessment/Plan:   8/7 Neurology  EXAM  still feels a bit fuzzy and a bit wobbly, but aside from this she is back to baseline. She no longer has headache, nausea or vomiting.  Patient and her  expressed concern that the above event happened after her first dose of Zoloft     First-time seizure w/ radiographic findings of cerebrovascular dysregulation (PRES/RCVS).  8/6 MRI demonstrated multiple patchy areas of vasogenic edema scattered throughout both cerebral hemispheres with several punctate foci of restricted diffusion consistent with cerebrovascular dysregulation syndrome.   Timeframe and clinical picture consistent with eclampsia.  Cerebrovascular dysregulation syndrome in the postpartum state consistent with eclampsia, unclear of the association if any with Zoloft.  Plan:  Recommend obtaining the gadolinium portion of MRI, no need to repeat the noncontrast sequences.   Recommend continuing Keppra for now.  Repeat MRI in 1 week with gadolinium, at which time the above radiographic findings should be improving if due to cerebrovascular dysregulation syndrome.  Given new infant at home, would consider continuing Keppra for a few weeks or until seen by outpatient epilepsy.   [employer] form to be [place] out and sent as part of mandatory reporting however the above seizure is considered provoked and occurring during a nonrecurrent medical illness.  As such, there is no clear medical reason to suspend patient's 's license.  Would recommend avoid driving for the next 1 week at least.  If above-mentioned MRI findings have not begun to improve or worsened, would continue both Keppra and avoidance of driving, and patient will likely need further workup.  Would consider avoiding SSRIs for now, and discuss with psychiatry  regarding any other agents. (Patient and  seem reluctant to restart Zoloft regardless of medical advice, which is understandable).    8/8/2024 OB MD  Post partum #11 w Eclampsia continue Keppra and start Lexapro per multidisciplinary discussions.   Repeat MRI w/ contrast on 8/7 w/ normal findings   Follow vitals Q4HR, sympt of worsening eclampsia, AM labs  Neuro  Cerebrovascular dysregulation syndrome in the postpartum state consistent with eclampsia, unclear of the association if any with Zoloft.   Recommend continuing Keppra for now.  Repeat MRI in 1 week with gadolinium, at which time the above radiographic findings should be improving if due to cerebrovascular dysregulation syndrome.  Given new infant at home, would consider continuing Keppra for a few weeks or until seen by outpatient epilepsy.  If above-mentioned MRI findings have not begun to improve or worsened,  continue both Keppra and avoidance of driving, and  likely need further workup  Vital Signs (last 3 days) before discharge       Date/Time Temp Pulse Resp BP MAP (mmHg) SpO2 O2 Device Cardiac (WDL) Patient Position - Orthostatic VS Pain    08/08/24 1121 98.5 °F (36.9 °C) 62 16 132/83 -- -- -- -- -- --    08/08/24 0900 -- -- -- -- -- -- None (Room air) WDL -- No Pain    08/08/24 0730 98.5 °F (36.9 °C) 64 16 130/80 -- 97 % -- -- -- --    08/08/24 0300 -- 64 16 137/77 -- -- -- -- Lying --    08/07/24 2300 98.9 °F (37.2 °C) 76 18 135/75 -- -- -- -- Lying --    08/07/24 1900 99.2 °F (37.3 °C) 58 18 135/85 -- -- -- -- Sitting --    08/07/24 1840 -- -- -- -- -- -- -- -- -- No Pain    08/07/24 1555 -- -- -- -- -- -- None (Room air) WDL -- No Pain    08/07/24 1500 98.3 °F (36.8 °C) 70 -- 130/76 -- -- -- -- -- --    08/07/24 1200 -- -- -- -- -- -- -- -- -- No Pain    08/07/24 1100 99.4 °F (37.4 °C) 86 18 118/73 -- -- -- -- -- --    08/07/24 0810 -- -- -- -- -- -- None (Room air) [facility] -- No Pain    08/07/24 0700 98.9 °F (37.2 °C) 88 18 131/79 -- -- -- --  -- --    08/07/24 0554 -- -- -- -- -- -- -- -- -- No Pain    08/07/24 0530 98.6 °F (37 °C) 97 20 120/73 -- 96 % None (Room air) -- Lying --    08/07/24 0330 -- 90 16 108/63 -- 95 % None (Room air) -- Lying --    08/07/24 0130 98.9 °F (37.2 °C) 85 16 137/80 -- 94 % None (Room air) -- Lying --    08/07/24 0027 -- -- -- -- -- -- None (Room air) WDL -- No Pain    08/06/24 2330 -- 88 16 132/78 -- 95 % None (Room air) -- Lying No Pain    08/06/24 2130 -- 85 16 137/85 -- 95 % None (Room air) -- Lying --    08/06/24 1930 -- 92 18 144/83 -- -- -- -- Lying --    08/06/24 1730 -- 80 18 139/90 105 95 % None (Room air) -- -- --    08/06/24 1536 -- 84 -- -- -- 98 % -- -- -- --    08/06/24 1533 98.2 °F (36.8 °C) 90 18 129/85 -- -- -- -- -- No Pain    08/06/24 1531 -- 82 -- -- -- 96 % None (Room air) -- -- --    08/06/24 1516 -- 89 -- -- -- 98 % -- -- -- --    08/06/24 1511 -- 84 -- -- -- 98 % -- -- -- --    08/06/24 1506 -- 75 -- -- -- 95 % -- -- -- --    08/06/24 1501 -- 69 -- -- -- 95 % -- -- -- --    08/06/24 1456 -- 67 -- -- -- 96 % -- -- -- --    08/06/24 1451 -- 94 -- -- -- 97 % -- -- -- --    08/06/24 1446 -- 69 -- -- -- 95 % -- -- -- --    08/06/24 1441 -- 67 -- -- -- 95 % -- -- -- --    08/06/24 1436 -- 69 -- -- -- 95 % -- -- -- --    08/06/24 1433 -- 67 -- 136/80 -- -- -- -- -- --    08/06/24 1431 -- 75 -- -- -- 95 % -- -- -- --    08/06/24 1426 -- 73 -- -- -- 96 % -- -- -- --    08/06/24 1420 -- 72 -- -- -- 95 % -- -- -- --    08/06/24 1416 -- 72 -- -- -- 95 % -- -- -- --    08/06/24 1410 -- 69 -- -- -- 96 % -- -- -- --    08/06/24 1406 -- 71 -- -- -- 95 % -- -- -- --    08/06/24 1401 -- 66 -- -- -- 96 % -- -- -- --    08/06/24 1356 -- 66 -- -- -- 95 % -- -- -- --    08/06/24 1351 -- 63 -- -- -- 96 % -- -- -- --    08/06/24 1346 -- 62 -- -- -- 95 % -- -- -- --    08/06/24 1340 -- 63 -- -- -- 95 % -- -- -- --    08/06/24 1336 -- 62 -- -- -- 96 % -- -- -- --    08/06/24 1333 -- 70 -- -- -- -- -- -- -- --    08/06/24 1331  -- 69 18 131/85 -- 96 % None (Room air) -- -- --    08/06/24 1325 -- 76 -- -- -- 96 % -- -- -- --    08/06/24 1321 -- 66 -- -- -- 96 % -- -- -- --    08/06/24 1206 -- -- -- -- -- -- -- -- -- Med Not Given for Pain - for MAR use only    08/06/24 1205 -- 84 -- -- -- 97 % -- -- -- --    08/06/24 1200 -- 72 -- -- -- 94 % -- -- -- --    08/06/24 1155 -- 68 -- -- -- 94 % -- -- -- --    08/06/24 1150 -- 66 -- -- -- 94 % -- -- -- --    08/06/24 1145 -- 65 -- -- -- 94 % -- -- -- --    08/06/24 1140 -- 67 -- -- -- 95 % -- -- -- --    08/06/24 1135 -- 62 -- -- -- 93 % -- -- -- --    08/06/24 1132 -- 77 -- 133/81 -- -- -- -- -- --    08/06/24 1130 -- 71 -- -- -- 95 % None (Room air) -- -- --    08/06/24 1125 -- 70 -- -- -- 95 % -- -- -- --    08/06/24 1120 -- 71 -- -- -- 96 % -- -- -- --    08/06/24 1115 -- 69 -- -- -- 97 % -- -- -- --    08/06/24 1110 98.3 °F (36.8 °C) 67 -- -- -- 96 % -- -- -- No Pain    08/06/24 1107 -- 71 -- -- -- -- -- -- -- --    08/06/24 1105 -- 84 -- 137/92 -- 93 % -- -- -- --    08/06/24 1043 -- 66 -- -- -- 96 % -- -- -- --    08/06/24 1038 -- 66 -- -- -- 97 % -- -- -- --    08/06/24 1037 -- 63 -- 131/81 -- -- -- -- -- --    08/06/24 1033 -- 72 -- -- -- 95 % -- -- -- --    08/06/24 1028 -- 77 -- -- -- 97 % -- -- -- --    08/06/24 1023 -- 69 -- -- -- 96 % -- -- -- --    08/06/24 1018 -- 77 -- -- -- 95 % -- -- -- --    08/06/24 1013 -- 69 -- -- -- 95 % -- -- -- --    08/06/24 1008 -- 67 -- -- -- 95 % -- -- -- --    08/06/24 1007 -- 66 -- 138/83 -- -- -- -- -- --    08/06/24 1003 -- 69 -- -- -- 95 % -- -- -- --    08/06/24 0958 -- 66 -- -- -- 95 % -- -- -- --    08/06/24 0953 -- 67 -- -- -- 95 % -- -- -- --    08/06/24 0948 -- 67 -- -- -- 96 % -- -- -- --    08/06/24 0943 -- 66 -- -- -- 97 % -- -- -- --    08/06/24 0938 -- 67 -- -- -- 96 % -- -- -- --    08/06/24 0936 -- 63 19 135/84 -- -- -- -- -- --    08/06/24 0933 -- 72 -- -- -- 97 % None (Room air) -- -- --    08/06/24 0915 -- 73 -- -- -- -- -- -- --  --    08/06/24 0913 -- 71 -- 130/78 -- 94 % -- -- -- --    08/06/24 0908 -- 69 -- -- -- 94 % -- -- -- --    08/06/24 0903 -- 70 -- -- -- 95 % -- -- -- --    08/06/24 0900 -- 65 -- 129/73 -- -- -- -- -- --    08/06/24 0858 -- 68 -- -- -- 95 % -- -- -- --    08/06/24 0853 -- 70 -- -- -- 94 % -- -- -- --    08/06/24 0848 -- 70 -- -- -- 94 % -- -- -- --    08/06/24 0843 -- 69 -- 134/78 -- 95 % -- -- -- --    08/06/24 0838 -- 67 -- -- -- 95 % -- -- -- --    08/06/24 0833 -- 69 -- 135/76 -- 95 % -- -- -- --    08/06/24 0828 -- 68 -- -- -- 93 % -- -- -- --    08/06/24 0824 -- 67 -- 130/68 -- -- -- -- -- --    08/06/24 0823 -- 69 -- -- -- 94 % -- -- -- --    08/06/24 0818 -- 66 -- -- -- 93 % -- -- -- --    08/06/24 0813 -- 63 -- 146/82 -- 93 % -- -- -- --    08/06/24 0808 -- 69 -- -- -- 94 % -- -- -- --    08/06/24 0804 -- 63 -- 147/79 -- -- -- -- -- --    08/06/24 0803 -- 65 -- -- -- 94 % -- -- -- --    08/06/24 0800 -- 66 -- -- -- 94 % -- -- -- --    08/06/24 0754 -- 64 -- 155/79 -- -- -- -- -- No Pain    08/06/24 0753 -- 66 -- -- -- 94 % -- -- -- --    08/06/24 0748 -- 65 -- -- -- 93 % -- -- -- --    08/06/24 0745 -- 66 -- -- -- 93 % -- -- -- --    08/06/24 0738 -- 65 -- -- -- 95 % -- -- -- --    08/06/24 0733 -- 64 -- -- -- 95 % -- -- -- --    08/06/24 0730 -- 65 -- 161/90 -- -- -- -- -- --    08/06/24 0728 -- 82 -- -- -- 97 % -- -- -- --    08/06/24 0718 -- 68 19 165/94 -- 95 % -- -- Lying --    08/06/24 0715 98.3 °F (36.8 °C) -- -- -- -- -- -- -- -- --    08/06/24 0645 -- 71 17 146/84 109 94 % None (Room air) -- Lying --    08/06/24 0629 -- 72 17 159/92 -- 95 % -- -- -- --    08/06/24 0608 -- -- -- -- -- -- None (Room air) -- -- --    08/06/24 0600 -- 80 17 176/94  124 94 % None (Room air) -- Lying --    08/06/24 0545 -- 80 18 166/94 124 94 % -- -- -- --    08/06/24 0530 -- 119 21 136/78 100 94 % None (Room air) -- Sitting --    08/06/24 0523 -- 116 18 153/89 -- -- -- -- Sitting --    08/06/24 0504 98.7 °F (37.1 °C)  -- -- -- -- -- -- -- -- --    08/06/24 0501 -- 82 16 173/101 -- 98 % None (Room air) -- Lying --          Weight (last 2 days) before discharge       Date/Time Weight    08/08/24 0730 72.6 (160)              Pertinent Labs/Diagnostic Results:   Radiology:  MRI brain w contrast   Final Interpretation by Eric Gonzales MD (08/07 9658)      No abnormal parenchymal or extra-axial enhancement.      MRI brain wo contrast   Final Interpretation by Brock Ravi MD (08/06 1340)      Multiple gyriform areas of vasogenic edema involving the cortex and subcortical white matter of the right greater than left frontal lobes and parietal lobes, and involving the parafalcine bilateral occipital lobes, which likely represent a posterior    reversible encephalopathy syndrome or alternatively reversible cerebrovascular constriction syndrome/ (RCVS), or less likely sequela of recent seizures.      Punctate foci of diffusion restriction involving the right frontal lobe, and right parietal lobe, in the areas of cortical/subcortical vasogenic edema, which may represent T2 shine through, or alternatively punctate subacute infarcts in these regions.      No significant mass effect, midline shift noted. No intracranial hemorrhage identified.         CT head wo contrast   Final Interpretation by E. Alec Schoenberger, MD (08/06 1203)      No acute intracranial abnormality.         MRI brain w wo contrast    (Results Pending)     Cardiology:  ECG 12 lead   Final Result by Yesy Hitchcock MD (08/06 0729)   Normal sinus rhythm   Normal ECG   No previous ECGs available   Confirmed by Yesy Hitchcock ([id number]) on 8/6/2024 7:29:43 AM        GI:  No orders to display           Results from last 7 days   Lab Units 08/08/24  0611 08/07/24  0542 08/07/24  0027 08/06/24  1739 08/06/24  1211 08/06/24  0509   WBC Thousand/uL 9.19 9.02 9.13 10.21* 8.37 8.30   HEMOGLOBIN g/dL 14.0 13.7 13.1 13.4 13.7 14.0   HEMATOCRIT % 43.9 42.0 39.9 40.7 40.7 43.3  "  PLATELETS Thousands/uL 266 276 263 261 272 260   TOTAL NEUT ABS Thousands/µL  --   --   --   --   --  7.26         Results from last 7 days   Lab Units 08/08/24  0611 08/07/24  0542 08/07/24  0027 08/06/24  1739 08/06/24 1211 08/06/24  0509   SODIUM mmol/L 139 136 136 138 138 138   POTASSIUM mmol/L 3.9 3.6 3.5 3.7 4.1 5.3   CHLORIDE mmol/L 101 99 100 102 104 103   CO2 mmol/L 29 27 26 26 25 21   ANION GAP mmol/L 9 10 10 10 9 14*   BUN mg/dL 17 17 19 21 21 26*   CREATININE mg/dL 1.16 1.18 1.20 1.21 1.21 1.34*   EGFR ml/min/1.73sq m 65 64 62 62 62 55   CALCIUM mg/dL 8.6 8.6 8.6 8.7 8.9 9.3   MAGNESIUM mg/dL  --  7.0* 6.6* 5.8* 4.8* 2.1     Results from last 7 days   Lab Units 08/07/24  0542 08/07/24  0027 08/06/24 1739 08/06/24 1211 08/06/24  0509   AST U/L 11* 11* 12* 11* 18   ALT U/L 10 9 11 11 10   ALK PHOS U/L 60 58 68 68 64   TOTAL PROTEIN g/dL 6.7 6.6 6.7 6.7 7.3   ALBUMIN g/dL 3.7 3.6 3.6 3.6 3.9   TOTAL BILIRUBIN mg/dL 0.69 0.63 0.72 0.69 0.84     Results from last 7 days   Lab Units 08/06/24  0514   POC GLUCOSE mg/dl 131     Results from last 7 days   Lab Units 08/08/24  0611 08/07/24  0542 08/07/24  0027 08/06/24 1739 08/06/24 1211 08/06/24  0509   GLUCOSE RANDOM mg/dL 92 116 120 103 119 122             No results found for: \"BETA-HYDROXYBUTYRATE\"                               Results from last 7 days   Lab Units 08/07/24  0542   TSH 3RD GENERATON uIU/mL 0.833                                                         Results from last 7 days   Lab Units 08/06/24  0601   CLARITY UA  Clear   COLOR UA  Colorless   SPEC GRAV UA  1.008   PH UA  7.0   GLUCOSE UA mg/dl Negative   KETONES UA mg/dl Negative   BLOOD UA  Trace*   PROTEIN UA mg/dl Trace*   NITRITE UA  Negative   BILIRUBIN UA  Negative   UROBILINOGEN UA (BE) mg/dl <2.0   LEUKOCYTES UA  Negative   WBC UA /hpf 2-4*   RBC UA /hpf 2-4*   BACTERIA UA /hpf Occasional   EPITHELIAL CELLS WET PREP /hpf None Seen   MUCUS THREADS  Occasional*         "     Medications:   Scheduled Medications:  8/6 IV =  labetalol (NORMODYNE) injection 20 mg  Dose: 20 mg  Freq: Once Route: IV  Start: 08/06/24 0615 End: 08/06/24 0613   Admin Instructions:              0613          labetalol (NORMODYNE) injection 40 mg  Dose: 40 mg  Freq: Once Route: IV  Start: 08/06/24 0730 End: 08/06/24 0743 8/7 & 8/8 PO=levETIRAcetam (KEPPRA) tablet 750 mg  Dose: 750 mg  Freq: Every 12 hours scheduled Route: PO  Start: 08/07/24 0900 End: 08/08/24 1620    IVB 8/6=magnesium sulfate 4 g/100 mL IVPB (premix) 4 g  Dose: 4 g  Freq: Once Route: IV  Last Dose: Stopped (08/06/24 0601)  Start: 08/06/24 0515 End: 08/06/24 0601    IV 8/6 x1 =metoclopramide (REGLAN) injection 10 mg  Dose: 10 mg  Freq: Once Route: IV  Start: 08/06/24 0800 End: 08/06/24 0803    Continuous IV Infusions:  8/6 0733 & DC 8/7 0640= =magnesium sulfate 20 g/500 mL infusion (premix)  Rate: 25 mL/hr Dose: 1 g/hr  Freq: Continuous Route: IV  Last Dose: Stopped (08/07/24 0713)  Start: 08/06/24 0700 End: 08/07/24 0640   & DC     lactated ringers infusion  Rate: 50 mL/hr Dose: 50 mL/hr  Freq: Continuous Route: IV  Last Dose: 50 mL/hr (08/07/24 0029)  Start: 08/06/24 0945 End: 08/07/24 0640     PRN Meds:  No current facility-administered medications for this encounter.      Discharge Plan: DC 8/8    Network Utilization Review Department  ATTENTION: Please call with any questions or concerns to [phone] and carefully listen to the prompts so that you are directed to the right person. All voicemails are confidential.   For Discharge needs, contact Care Management DC Support Team at [phone] opt. [phone]  Send all requests for admission clinical reviews, approved or denied determinations and any other requests to dedicated fax number below belonging to the campus where the patient is receiving treatment. List of dedicated fax numbers for the Facilities:  FACILITY NAME UR FAX NUMBER   ADMISSION DENIALS  (Administrative/Medical Necessity) [phone]   DISCHARGE SUPPORT TEAM (NETWORK) [phone]   PARENT CHILD HEALTH (Maternity/NICU/Pediatrics) [phone]   [facility] [phone]   [facility] [phone]   [facility] [phone]   [facility] [phone]   [facility] [phone]   [facility] [phone]   [facility] [phone]   [facility] [phone]   [facility] [phone]   [facility] [phone]   [facility] [phone]   [facility] [phone]

## 2024-08-12 ENCOUNTER — TELEPHONE (OUTPATIENT)
Age: 24
End: 2024-08-12

## 2024-08-12 NOTE — TELEPHONE ENCOUNTER
Angelica from [facility] Risk Maternity program calling in regards to the patient and Angelica was notified that the patient delivered on 7/28/24 and as per Angelica the case will be closed once management reviews the chart.

## 2024-08-14 ENCOUNTER — POSTPARTUM VISIT (OUTPATIENT)
Dept: OBGYN CLINIC | Facility: CLINIC | Age: 24
End: 2024-08-14

## 2024-08-14 ENCOUNTER — TELEPHONE (OUTPATIENT)
Age: 24
End: 2024-08-14

## 2024-08-14 VITALS
DIASTOLIC BLOOD PRESSURE: 72 MMHG | WEIGHT: 136.1 LBS | BODY MASS INDEX: 23.23 KG/M2 | HEIGHT: 64 IN | OXYGEN SATURATION: 98 % | SYSTOLIC BLOOD PRESSURE: 112 MMHG | HEART RATE: 76 BPM

## 2024-08-14 DIAGNOSIS — G93.6 VASOGENIC BRAIN EDEMA (HCC): ICD-10-CM

## 2024-08-14 PROCEDURE — PNV: Performed by: OBSTETRICS & GYNECOLOGY

## 2024-08-14 NOTE — ASSESSMENT & PLAN NOTE
2.5 wks PostPartum.  Normal postpartum exam.   The patient may advance activity as tolerated and may resume sexual activity at 6 wks PP.  Contraception discussed. Patient plans: condoms. Risks of short interval pregnancy reviewed   EDPS score 8. Signs and symptoms of postpartum depression reviewed. She will f/u with Psych ASAP   Due for pap, will repeat at next postpartum exam  Precautions reviewed

## 2024-08-14 NOTE — TELEPHONE ENCOUNTER
Contacted patient in regards to ASAP Referral  in attempts to verify patient's needs of services and offer appointment for preferred services. LVM for patient to contact intake dept  in regards to referral.

## 2024-08-14 NOTE — PROGRESS NOTES
Patient ID: Ana Banuelos is a 23 y.o. female.    Chief Complaint   Patient presents with    Postpartum Care     No concerns  Ppd -8   Sub - none           She presents for routine postpartum visit.     She is now a  who is 2.5 wks s/p primary C/S on 24 - IOL at 40+ weeks, persistent category 2 FHT remote from delivery.  Her postpartum course has been complicated by postpartum depression and most recently hospitalization from - for ecclampsia.  Neurology saw her and recommended starting keppra  She was also seen by psychiatry and switched to lexapro, although zoloft was deemed not to be associated with seizures   She did not require antihypertensive medications   She is feeling much better emotionally     No neurology f/u yet scheduled  Psych has attempted to reach pt, no f/u scheduled     BP's all normal at home    Baby girl is thriving and is Bottle feeding/formula     Eckerman  Depression Scale Total: 8  Plans for contraception: condoms     Pap 3/2021 NILM       The following portions of the patient's history were reviewed and updated as appropriate: allergies, current medications, past family history, past medical history, past social history, past surgical history, and problem list.    Review of Systems   Constitutional:  Negative for appetite change, chills and fever.   Eyes:  Negative for visual disturbance.   Respiratory:  Negative for cough, chest tightness and shortness of breath.    Cardiovascular:  Negative for chest pain.   Gastrointestinal:  Negative for abdominal distention, abdominal pain, constipation, diarrhea, nausea and vomiting.   Endocrine: Negative for cold intolerance and heat intolerance.   Genitourinary:  Negative for difficulty urinating, dyspareunia, dysuria, frequency, genital sores, pelvic pain, urgency, vaginal bleeding, vaginal discharge and vaginal pain.   Musculoskeletal:  Negative for arthralgias.   Neurological:  Negative for light-headedness  "and headaches.   Hematological:  Does not bruise/bleed easily.   Psychiatric/Behavioral:  Negative for behavioral problems.    All other systems reviewed and are negative.               Objective:  /72 (BP Location: Right arm, Patient Position: Sitting, Cuff Size: Adult)   Pulse 76   Ht 5' 4\" (1.626 m)   Wt 61.7 kg (136 lb 1.6 oz)   LMP 10/19/2023 (Exact Date)   SpO2 98%   BMI 23.36 kg/m²     Physical Exam  Constitutional:       General: She is not in acute distress.     Appearance: Normal appearance.   HENT:      Head: Normocephalic and atraumatic.   Cardiovascular:      Rate and Rhythm: Normal rate.   Pulmonary:      Effort: Pulmonary effort is normal. No respiratory distress.   Abdominal:      General: A surgical scar is present. There is no distension.      Palpations: Abdomen is soft.      Tenderness: There is no abdominal tenderness. There is no guarding or rebound.      Comments: Incision C/D/I, steri strips removed    Neurological:      General: No focal deficit present.      Mental Status: She is alert.   Psychiatric:         Mood and Affect: Mood normal.         Behavior: Behavior normal.   Vitals and nursing note reviewed.                   Postpartum Depression: Medium Risk (2024)    Tyro  Depression Scale     Last EPDS Total Score: 8     Last EPDS Self Harm Result: Never         Assessment/Plan:    Problem List Items Addressed This Visit       Postpartum depression     Doing much better, now on lexapro. She is aware of need for psych f/u and will reach out          Vasogenic brain edema (HCC)     Advised f/u with neurology to determine duration of keppra therapy and driving restrictions          Encounter for routine postpartum follow-up - Primary     2.5 wks PostPartum.  Normal postpartum exam.   The patient may advance activity as tolerated and may resume sexual activity at 6 wks PP.  Contraception discussed. Patient plans: condoms. Risks of short interval pregnancy " reviewed   EDPS score 8. Signs and symptoms of postpartum depression reviewed. She will f/u with Psych ASAP   Due for pap, will repeat at next postpartum exam  Precautions reviewed

## 2024-08-19 ENCOUNTER — TELEPHONE (OUTPATIENT)
Dept: OBGYN CLINIC | Facility: CLINIC | Age: 24
End: 2024-08-19

## 2024-08-19 ENCOUNTER — PATIENT MESSAGE (OUTPATIENT)
Dept: OBGYN CLINIC | Facility: CLINIC | Age: 24
End: 2024-08-19

## 2024-08-19 NOTE — TELEPHONE ENCOUNTER
Patient has been noncompliant in BP monitoring program.  Has not submitted blood pressure readings since 8/16/2024.  Called to remind patient to submit readings.  Spoke to patient.  Reviewed instructions for submitting readings. Verbalized understanding and will submit BP readings.

## 2024-08-20 ENCOUNTER — TELEPHONE (OUTPATIENT)
Age: 24
End: 2024-08-20

## 2024-08-21 NOTE — TELEPHONE ENCOUNTER
Called and spoke with pt's spouse Jordan. He did end up bring Ana to a local hospital where she is admitted for psychiatric evaluation. He indicates some frustration with the care there and is potentially looking for other options locally. Their baby is being well called for by grandparents. Emotional support provided. Advised to reach out at any point, and if they have returned locally so we can look into other treatment options as needed.

## 2024-08-22 NOTE — PATIENT COMMUNICATION
Patient was called and left a detailed voice message informing her that the FMLA for her  was completed and faxed over. A fax confirmation was received.

## 2024-08-22 NOTE — PATIENT COMMUNICATION
Patient had sent in the signed form on my chart and had asked if that paperwork can now be faxed to 's employer. Please advise they need this sent today as his work needs it by tomorrow 8/23/24.

## 2024-08-23 ENCOUNTER — TELEPHONE (OUTPATIENT)
Age: 24
End: 2024-08-23

## 2024-08-23 NOTE — TELEPHONE ENCOUNTER
Patient has been noncompliant in BP monitoring program.  Has not submitted blood pressure readings since 8/20/24.  Called to remind patient to submit readings.  Left reminder message on voicemail to submit BP readings twice daily.

## 2024-08-26 ENCOUNTER — TELEPHONE (OUTPATIENT)
Dept: OBGYN CLINIC | Facility: CLINIC | Age: 24
End: 2024-08-26

## 2024-08-26 NOTE — TELEPHONE ENCOUNTER
Patient has been noncompliant in BP monitoring program.  Has not submitted blood pressure readings since 8/20.  Called to remind patient to submit readings.  Left reminder message on voicemail to submit BP readings twice daily.    Pt was on a current hold in [place] for Suicidal ideation.

## 2024-08-27 ENCOUNTER — TELEPHONE (OUTPATIENT)
Dept: OBGYN CLINIC | Facility: CLINIC | Age: 24
End: 2024-08-27

## 2024-08-27 NOTE — TELEPHONE ENCOUNTER
Patient has been noncompliant in BP monitoring program.  Has not submitted blood pressure readings since 8/20.  Called to remind patient to submit readings.  Left reminder message on voicemail to submit BP readings twice daily.

## 2024-08-28 ENCOUNTER — OFFICE VISIT (OUTPATIENT)
Dept: OBGYN CLINIC | Facility: CLINIC | Age: 24
End: 2024-08-28
Payer: COMMERCIAL

## 2024-08-28 ENCOUNTER — NURSE TRIAGE (OUTPATIENT)
Age: 24
End: 2024-08-28

## 2024-08-28 VITALS
SYSTOLIC BLOOD PRESSURE: 108 MMHG | BODY MASS INDEX: 22.09 KG/M2 | WEIGHT: 129.4 LBS | HEIGHT: 64 IN | DIASTOLIC BLOOD PRESSURE: 70 MMHG

## 2024-08-28 DIAGNOSIS — N93.9 ABNORMAL UTERINE BLEEDING (AUB): Primary | ICD-10-CM

## 2024-08-28 DIAGNOSIS — G93.6 VASOGENIC BRAIN EDEMA (HCC): ICD-10-CM

## 2024-08-28 PROBLEM — Z98.891 STATUS POST PRIMARY LOW TRANSVERSE CESAREAN SECTION: Status: RESOLVED | Noted: 2024-08-07 | Resolved: 2024-08-28

## 2024-08-28 PROCEDURE — 99213 OFFICE O/P EST LOW 20 MIN: CPT | Performed by: OBSTETRICS & GYNECOLOGY

## 2024-08-28 RX ORDER — ESCITALOPRAM OXALATE 10 MG/1
10 TABLET ORAL DAILY
COMMUNITY
Start: 2024-08-27

## 2024-08-28 NOTE — TELEPHONE ENCOUNTER
"Patient calling in stating that she's 4 weeks postpartum  24, , and she called in stating that she has had brown bleeding recently but now it is bright red bleeding with clots the size of a dime for reference. Pt reporting the bleeding is filling up half a pad in about 2 hours, pt reporting clots the size of a dime. Pt reporting dizziness when she had gotten up this morning, pt reporting she got up too quick, pt denies dizziness at this time, pt reporting she can stand and walk normally.     Patient is advised that she is to be seen in the office within 3 days. Patient was offered an appt today and has decline the offer, pt refused to go to [facility] for tomorrow. Warm transferred to [place] for further scheduling assistance       Reason for Disposition   Vaginal bleeding or spotting lasts > 4 weeks AND red or red-brown    Answer Assessment - Initial Assessment Questions  1. ONSET: \"Describe your bleeding: is it getting worse, staying the same, improving, or stopping and starting?\"        Pt reporting the bleeding is getting worse as per pt report     2. AMOUNT: \"How much bleeding are you having today?\"      - SPOTTING: spotting, or pinkish / brownish mucous discharge; does not fill panti-liner or pad     - MILD:  less than 1 pad / hour; less than patient's usual menstrual bleeding    - MODERATE: 1-2 pads / hour; 1 menstrual cup every 6 hours; small-medium blood clots (e.g., pea, grape, small coin)    - SEVERE: soaking 2 or more pads/hour for 2 or more hours; 1 menstrual cup every 2 hours; bleeding not contained by pads or continuous red blood from vagina; large blood clots (e.g., golf ball, large coin)           Pt reporting the bleeding is filling up half a pad in about 2 hours, pt reporting clots the size of a dime     3. ABDOMINAL PAIN: \"Do you have any pain?\" \"How bad is the pain?\" \"What does it keep you from doing?\"      - MILD -  doesn't interfere with normal activities, abdomen soft and " "not tender to touch      - MODERATE - interferes with normal activities or awakens from sleep, tender to touch      - SEVERE - excruciating pain, doubled over, unable to do any normal activities        Pt denies   4. HORMONES: \"Are you taking any birth control medications?\" (e.g., birth control pills, Depo-Provera)      Pt denies   5. BLOOD THINNERS: \"Do you take any blood thinners?\" (e.g., coumadin, aspirin)      Pt denies   6. OTHER SYMPTOMS: \"Do you have any other symptoms?\" (e.g., fever, chills, dizziness)      Pt reporting dizziness when she had gotten up this morning, pt reporting she got up too quick, pt denies dizziness at this time.   7. DELIVERY DATE: \"When was your delivery date?\" \"Vaginal delivery or ?\"      24    8. BREASTFEEDING: \"Are you breastfeeding?\"      Pt reporting no .    Protocols used: Postpartum - Vaginal Bleeding and Lochia-ADULT-[place]    "

## 2024-08-28 NOTE — ASSESSMENT & PLAN NOTE
She reports no further suicidal ideation but has not noticed substantial improvement in mood overall. Discussed other tx options should SSRI's not be helpful such as zurzuvae.   She does have behavioral health f/u next week  Precautions reviewed

## 2024-08-28 NOTE — PROGRESS NOTES
Patient ID: Ana Banuelos is a 23 y.o. female.    Chief Complaint   Patient presents with    Vaginal Bleeding         She presents for postpartum visit - problem visit   She is now a  who is 4wks s/p primary C/S on 24  Had postpartum ecclampsia requiring hospitalization from -  Also severe postpartum depression - on lexapro. She was recently hospitalized due to suicidal idea, while she was visiting family in [place]   Admitted for 7 days, lexapro dose was increased to 10 mg   No longer having suicial ideations - still feeling overwhelmed at times     This morning she had episode of heavier bleeding with passage of dime sized clot - since then has used 2 pads that are only 1/4 soaked   Denies associated cramping pain  Denies LH/dizziness/fevers/chills           The following portions of the patient's history were reviewed and updated as appropriate: allergies, current medications, past family history, past medical history, past social history, past surgical history, and problem list.    Review of Systems   Constitutional:  Negative for appetite change, chills and fever.   Eyes:  Negative for visual disturbance.   Respiratory:  Negative for cough, chest tightness and shortness of breath.    Cardiovascular:  Negative for chest pain.   Gastrointestinal:  Negative for abdominal distention, abdominal pain, constipation, diarrhea, nausea and vomiting.   Endocrine: Negative for cold intolerance and heat intolerance.   Genitourinary:  Positive for vaginal bleeding. Negative for difficulty urinating, dyspareunia, dysuria, frequency, genital sores, pelvic pain, urgency, vaginal discharge and vaginal pain.   Musculoskeletal:  Negative for arthralgias.   Neurological:  Negative for light-headedness and headaches.   Hematological:  Does not bruise/bleed easily.   Psychiatric/Behavioral:  Negative for behavioral problems.    All other systems reviewed and are negative.               Objective:  /70 (BP  "Location: Right arm, Patient Position: Sitting, Cuff Size: Standard)   Ht 5' 4\" (1.626 m)   Wt 58.7 kg (129 lb 6.4 oz)   LMP 10/19/2023 (Exact Date)   Breastfeeding No   BMI 22.21 kg/m²     Physical Exam  Constitutional:       General: She is not in acute distress.     Appearance: Normal appearance.   Genitourinary:      No lesions in the vagina.      Right Labia: No rash, tenderness, lesions or skin changes.     Left Labia: No tenderness, lesions, skin changes or rash.     Vaginal bleeding present.      No vaginal discharge, erythema, tenderness or ulceration.      Vaginal exam comments: Small dark blood in vault, small trickle with valsalva without pooling .        Right Adnexa: not tender, not full, no mass present and not absent.     Left Adnexa: not tender, not full, no mass present and not absent.     No cervical motion tenderness, friability, lesion or polyp.      Uterus is not fixed, tender, irregular or prolapsed.      No uterine mass detected.     Uterus exam comments: 10 week size .   HENT:      Head: Normocephalic and atraumatic.   Cardiovascular:      Rate and Rhythm: Normal rate.   Pulmonary:      Effort: Pulmonary effort is normal. No respiratory distress.   Abdominal:      General: A surgical scar is present. There is no distension.      Palpations: Abdomen is soft.      Tenderness: There is no abdominal tenderness. There is no guarding or rebound.      Comments: Incision C/D/I    Neurological:      General: No focal deficit present.      Mental Status: She is alert.   Psychiatric:         Mood and Affect: Mood normal.         Behavior: Behavior normal.   Vitals and nursing note reviewed. Exam conducted with a chaperone present.                       Assessment/Plan:    Problem List Items Addressed This Visit       Postpartum depression     She reports no further suicidal ideation but has not noticed substantial improvement in mood overall. Discussed other tx options should SSRI's not be helpful " such as zurzuvae.   She does have behavioral health f/u next week  Precautions reviewed          Relevant Medications    escitalopram (LEXAPRO) 10 mg tablet    Vasogenic brain edema (HCC)     Advised to reschedule MRI as previously recommended          Abnormal uterine bleeding (AUB) - Primary     Overall low suspicion for retained products given minimal bleeding on exam and no other concerning sx but will check CBC, HCG. Pelvic US also ordered          Relevant Orders    CBC and Platelet    US pelvis complete w transvaginal    hCG, quantitative

## 2024-08-28 NOTE — ASSESSMENT & PLAN NOTE
Overall low suspicion for retained products given minimal bleeding on exam and no other concerning sx but will check CBC, HCG. Pelvic US also ordered

## 2024-08-29 ENCOUNTER — HOSPITAL ENCOUNTER (OUTPATIENT)
Dept: RADIOLOGY | Facility: HOSPITAL | Age: 24
Discharge: HOME | End: 2024-08-29
Attending: OBSTETRICS & GYNECOLOGY
Payer: COMMERCIAL

## 2024-08-29 DIAGNOSIS — N93.9 ABNORMAL UTERINE BLEEDING (AUB): ICD-10-CM

## 2024-08-29 PROCEDURE — 76830 TRANSVAGINAL US NON-OB: CPT

## 2024-08-29 PROCEDURE — 76856 US EXAM PELVIC COMPLETE: CPT

## 2024-09-06 ENCOUNTER — TELEPHONE (OUTPATIENT)
Age: 24
End: 2024-09-06

## 2024-09-06 NOTE — TELEPHONE ENCOUNTER
Outgoing call to patient. Advised of providers recommendations. Patient verbalized understanding.

## 2024-09-06 NOTE — TELEPHONE ENCOUNTER
Spoke with patient who advised she normally takes her lexapro at night, but accidentally took it again this morning.  How should she go about continuing medication?  Should she take again tonight, or skip tonight's dose and take again tomorrow night.

## 2024-09-10 ENCOUNTER — HOSPITAL ENCOUNTER (OUTPATIENT)
Dept: MRI IMAGING | Facility: HOSPITAL | Age: 24
Discharge: HOME | End: 2024-09-10
Payer: COMMERCIAL

## 2024-09-10 ENCOUNTER — POSTPARTUM VISIT (OUTPATIENT)
Dept: OBGYN CLINIC | Facility: CLINIC | Age: 24
End: 2024-09-10

## 2024-09-10 VITALS
OXYGEN SATURATION: 98 % | WEIGHT: 128.6 LBS | BODY MASS INDEX: 21.95 KG/M2 | SYSTOLIC BLOOD PRESSURE: 112 MMHG | DIASTOLIC BLOOD PRESSURE: 62 MMHG | HEIGHT: 64 IN | HEART RATE: 84 BPM

## 2024-09-10 DIAGNOSIS — R56.9 NEW ONSET SEIZURE (HCC): ICD-10-CM

## 2024-09-10 DIAGNOSIS — Z12.4 SCREENING FOR CERVICAL CANCER: ICD-10-CM

## 2024-09-10 DIAGNOSIS — G93.6 VASOGENIC BRAIN EDEMA (HCC): ICD-10-CM

## 2024-09-10 DIAGNOSIS — R90.89 ABNORMAL FINDING ON MRI OF BRAIN: ICD-10-CM

## 2024-09-10 PROCEDURE — 99024 POSTOP FOLLOW-UP VISIT: CPT | Performed by: OBSTETRICS & GYNECOLOGY

## 2024-09-10 PROCEDURE — G0145 SCR C/V CYTO,THINLAYER,RESCR: HCPCS | Performed by: OBSTETRICS & GYNECOLOGY

## 2024-09-10 PROCEDURE — A9585 GADOBUTROL INJECTION: HCPCS | Performed by: PSYCHIATRY & NEUROLOGY

## 2024-09-10 PROCEDURE — 70553 MRI BRAIN STEM W/O & W/DYE: CPT

## 2024-09-10 RX ORDER — ESCITALOPRAM OXALATE 10 MG/1
10 TABLET ORAL DAILY
Qty: 30 TABLET | Refills: 0 | Status: SHIPPED | OUTPATIENT
Start: 2024-09-10

## 2024-09-10 RX ORDER — GADOBUTROL 604.72 MG/ML
6 INJECTION INTRAVENOUS
Status: COMPLETED | OUTPATIENT
Start: 2024-09-10 | End: 2024-09-10

## 2024-09-10 RX ADMIN — GADOBUTROL 6 ML: 604.72 INJECTION INTRAVENOUS at 20:48

## 2024-09-10 NOTE — ASSESSMENT & PLAN NOTE
6 wks PostPartum.  Normal postpartum exam.   The patient may advance activity as tolerated and may resume sexual activity at 6 wks PP.  Contraception discussed. Patient plans: condoms. Risks of short interval pregnancy reviewed   EDPS score 10. Signs and symptoms of postpartum depression reviewed  Due for pap smear today, collected  Precautions reviewed

## 2024-09-10 NOTE — PROGRESS NOTES
Patient ID: Ana Banuelos is a 23 y.o. female.    Chief Complaint   Patient presents with    Postpartum Care     6 wk         She presents for routine postpartum visit.   She is now a  who is 6wks s/p primary C/S on 24  Had postpartum ecclampsia requiring hospitalization from -  Also severe postpartum depression - on lexapro. She was recently hospitalized due to suicidal idea, while she was visiting family in [place]   Admitted for 7 days, lexapro dose was increased to 10 mg       She was last seen at 4 weeks postpartum with episode of heavier bleeding. She had a pelvic US which was not concerning for retained POC  Bleeding stopped within a few days   Repeat MRI brain scheduled today     Following with therapy outpatient (not [facility]). She feels overall things are improving, still some down days but not as deep depression as previously and no SI/HI   SA once since delivery without issues     She denies abn bleeding, pelvic pain, breast complaints, bowel/bladder dysfunction,   Baby girl is thriving and is Bottle feeding    [place]  Depression Scale Total: 10  Plans for contraception: condoms    Pap 3/2021 NILM           The following portions of the patient's history were reviewed and updated as appropriate: allergies, current medications, past family history, past medical history, past social history, past surgical history, and problem list.    Review of Systems   Constitutional:  Negative for chills and fever.   Eyes:  Negative for visual disturbance.   Respiratory:  Negative for chest tightness and shortness of breath.    Cardiovascular:  Negative for chest pain.   Gastrointestinal:  Negative for abdominal pain, diarrhea, nausea and vomiting.   Genitourinary:  Negative for pelvic pain and vaginal bleeding.        As noted in HPI   Skin:  Negative for rash.   Neurological:  Negative for headaches.   All other systems reviewed and are negative.               Objective:  /62 (BP  "Location: Right arm, Patient Position: Sitting, Cuff Size: Adult)   Pulse 84   Ht 5' 4\" (1.626 m)   Wt 58.3 kg (128 lb 9.6 oz)   LMP 10/19/2023 (Exact Date)   SpO2 98%   Breastfeeding No   BMI 22.07 kg/m²     Physical Exam  Constitutional:       General: She is not in acute distress.     Appearance: Normal appearance. She is not ill-appearing.   Genitourinary:      Bladder and urethral meatus normal.      Right Labia: No rash, tenderness, lesions or skin changes.     Left Labia: No tenderness, lesions, skin changes or rash.     No labial fusion noted.      No inguinal adenopathy present in the right or left side.     No vaginal discharge, erythema, tenderness, bleeding or ulceration.        Right Adnexa: not tender, not full and no mass present.     Left Adnexa: not tender, not full and no mass present.     No cervical motion tenderness, discharge, friability, lesion, polyp or eversion.      Uterus is not enlarged, fixed, tender or irregular.      No uterine mass detected.     Uterus is anteverted.      Pelvic exam was performed with patient in the lithotomy position.   HENT:      Head: Normocephalic.   Cardiovascular:      Rate and Rhythm: Normal rate.      Heart sounds: Normal heart sounds.   Pulmonary:      Effort: Pulmonary effort is normal. No accessory muscle usage or respiratory distress.   Abdominal:      General: A surgical scar is present. There is no distension.      Palpations: Abdomen is soft. There is no mass.      Tenderness: There is no abdominal tenderness. There is no guarding or rebound.      Comments: Incision C/D/I    Musculoskeletal:         General: Normal range of motion.      Cervical back: No rigidity.   Lymphadenopathy:      Lower Body: No right inguinal adenopathy. No left inguinal adenopathy.   Neurological:      General: No focal deficit present.      Mental Status: She is alert. Mental status is at baseline.   Skin:     General: Skin is warm and dry.   Psychiatric:         Mood " and Affect: Mood normal.         Behavior: Behavior normal.   Vitals and nursing note reviewed. Exam conducted with a chaperone present.                   Postpartum Depression: High Risk (9/10/2024)    [place]  Depression Scale     Last EPDS Total Score: 10     Last EPDS Self Harm Result: Never         Assessment/Plan:    Problem List Items Addressed This Visit       Postpartum depression     Following with therapy outpatient, lexapro refilled x 1 month          Relevant Medications    escitalopram (LEXAPRO) 10 mg tablet    Vasogenic brain edema (HCC)     Has MRI scheduled today          Encounter for routine postpartum follow-up - Primary     6 wks PostPartum.  Normal postpartum exam.   The patient may advance activity as tolerated and may resume sexual activity at 6 wks PP.  Contraception discussed. Patient plans: condoms. Risks of short interval pregnancy reviewed   EDPS score 10. Signs and symptoms of postpartum depression reviewed  Due for pap smear today, collected  Precautions reviewed             Other Visit Diagnoses       Screening for cervical cancer        Relevant Orders    Liquid-based pap, screening

## 2024-09-11 ENCOUNTER — TELEPHONE (OUTPATIENT)
Dept: OBGYN CLINIC | Facility: MEDICAL CENTER | Age: 24
End: 2024-09-11

## 2024-09-11 NOTE — TELEPHONE ENCOUNTER
Patient has been noncompliant in BP monitoring program.  Has not submitted blood pressure readings since 9/9.  Called to remind patient to submit readings.  Spoke to patient.  Reviewed instructions for submitting readings. Verbalized understanding and will submit BP readings.

## 2024-09-16 ENCOUNTER — TELEPHONE (OUTPATIENT)
Dept: OBGYN CLINIC | Facility: CLINIC | Age: 24
End: 2024-09-16

## 2024-09-16 NOTE — TELEPHONE ENCOUNTER
Patient has been noncompliant in BP monitoring program.  Has not submitted blood pressure readings since 9/13/2024.  Called to remind patient to submit readings.  Left reminder message on voicemail to submit BP readings twice daily.

## 2024-09-17 ENCOUNTER — TELEPHONE (OUTPATIENT)
Dept: OBGYN CLINIC | Facility: CLINIC | Age: 24
End: 2024-09-17

## 2024-09-17 NOTE — TELEPHONE ENCOUNTER
Patient has been noncompliant in BP monitoring program.  Has not submitted blood pressure readings since 9/13.  Called to remind patient to submit readings.  Spoke to patient.  Reviewed instructions for submitting readings. Verbalized understanding and will submit BP readings.     Pt is away currently until Sunday. Pt will continue once home.

## 2024-09-18 ENCOUNTER — TELEPHONE (OUTPATIENT)
Dept: OTHER | Facility: HOSPITAL | Age: 24
End: 2024-09-18

## 2024-09-18 LAB
LAB AP GYN PRIMARY INTERPRETATION: NORMAL
LAB AP LMP: NORMAL
Lab: NORMAL

## 2024-09-18 NOTE — TELEPHONE ENCOUNTER
"Reviewed MRI brain which is now normal with resolution of previously noted vasogenic edema. Also reviewed patient's chart and read that patient was admitted to inpatient psychiatry on 8/20/24. Patient was discharged. No additional notes in the system since then.    Called patient's and 's cell to update. Initially left VM messages; however ultimately was able to speak with Ana directly.   Discussed the following:  - Informed patient that MRI was normal and vasogenic edema had resolved  - Patient reported no further seizure activity and no abnormal neurologic symptoms  - Patient stopped her Keppra on 9/13/24  - Patient states she is \"having more good days but still having some tough days.\"  - She sees a therapist currently but is planning on reaching out to [facility] \"[facility]\" to try to establish with a mental health provider through their program  - Last recorded BP was 115/78. No need for additional BP monitoring at this time if patient's BP has been normotensive.  - Patient notes that her license was revoked.    Will plan to have the office call the patient to schedule a followup appointment within the next few weeks with the epileptology team. At that time discussion can occur regarding provoked seizure in the setting of vasogenic edema, now with normal follow-up MRI. Will defer to outpatient provider if additional paperwork can be completed to provide [employer] with updated information about likely provoked event to possibly get license reinstated if appropriate based on patient's clinical status and provider's judgement.  "

## 2024-09-18 NOTE — TELEPHONE ENCOUNTER
HFU/ SL ALL/ New onset seizure     DC- 8/8/2024- HOME    Denisse Phan PA-C   [facility]         9/18/24  1:02 PM  Please schedule patient with HFU with Attending epileptologist within the next 2-3 weeks if possible.

## 2024-09-19 ENCOUNTER — TELEPHONE (OUTPATIENT)
Dept: OTHER | Facility: HOSPITAL | Age: 24
End: 2024-09-19

## 2024-09-19 NOTE — TELEPHONE ENCOUNTER
Called and made patient aware that she has completed the BP program and the BP cuff is hers to keep.

## 2024-09-19 NOTE — TELEPHONE ENCOUNTER
Patient schedule 1/20/24/ Laurie/MARICEL/ 11 am       Patient was Ok with scheduling out and remains on the wait list for any sooner appointments

## 2024-09-19 NOTE — TELEPHONE ENCOUNTER
1ST ATTEMPT,     Called pt no answer, LMOM.    Mychart message sent as well as placed on waiting list     Thank you,     Saige

## 2024-12-09 ENCOUNTER — TELEPHONE (OUTPATIENT)
Dept: NEUROLOGY | Facility: CLINIC | Age: 24
End: 2024-12-09

## 2024-12-09 NOTE — TELEPHONE ENCOUNTER
Called and left VM - offering pt sooner appt on 12/10 @ 1:30 or 3:30 in [facility] office with Dr Dejesus. Provided call back number if patient is interested, as long as slots are still available.

## 2025-01-20 ENCOUNTER — OFFICE VISIT (OUTPATIENT)
Dept: NEUROLOGY | Facility: CLINIC | Age: 25
End: 2025-01-20
Payer: COMMERCIAL

## 2025-01-20 VITALS
SYSTOLIC BLOOD PRESSURE: 106 MMHG | BODY MASS INDEX: 23.17 KG/M2 | DIASTOLIC BLOOD PRESSURE: 62 MMHG | HEIGHT: 64 IN | HEART RATE: 75 BPM | WEIGHT: 135.7 LBS

## 2025-01-20 DIAGNOSIS — I67.83 PRES (POSTERIOR REVERSIBLE ENCEPHALOPATHY SYNDROME): Primary | ICD-10-CM

## 2025-01-20 DIAGNOSIS — R56.9 PROVOKED SEIZURE (HCC): ICD-10-CM

## 2025-01-20 PROBLEM — R90.89 ABNORMAL FINDING ON MRI OF BRAIN: Status: RESOLVED | Noted: 2024-08-07 | Resolved: 2025-01-20

## 2025-01-20 PROCEDURE — 99215 OFFICE O/P EST HI 40 MIN: CPT | Performed by: PSYCHIATRY & NEUROLOGY

## 2025-01-20 PROCEDURE — G2212 PROLONG OUTPT/OFFICE VIS: HCPCS | Performed by: PSYCHIATRY & NEUROLOGY

## 2025-01-20 NOTE — PATIENT INSTRUCTIONS
-- continue to monitor your blood pressure with your PCP.     -- If you have additional pregnancies, it will be very important to watch your blood pressure as well to assure it doesn't get too high.

## 2025-01-20 NOTE — PROGRESS NOTES
Neurology Ambulatory Visit  Name: Ana Banuelos       : 2000       MRN: [id number]   Encounter Provider: Som Sullivan MD   Encounter Date: 2025  Encounter department: [facility] [place]    Assessment and Plan  Assessment & Plan  PRES (posterior reversible encephalopathy syndrome)  Overall reviewing her imaging and clinical history, the previously seen T2 hyperintense lesions are most consistent with posterior reversible encephalopathy syndrome.  She has had resolution of these findings on repeat MRI. PRES is commonly caused by severe high blood pressure, which was the case when she initially presented to the hospital.  Because she has now since completely resolved and is not having any other issues, it is unlikely that this would recur unless she would have severe high blood pressure again.    --At this point I would not recommend any additional testing since her MRI findings have resolved and she has been asymptomatic.  Her neurologic exam is normal.    --If she would plan future pregnancies, it would be very important to monitor her blood pressures very closely to assure that these do not become elevated, as severe high blood pressure could trigger PRES again.  Provoked seizure (HCC)  The event that she experienced on 2024 is consistent with a seizure, but would be a provoked seizure because of PRES.  At this point she does not meet criteria for epilepsy since she has not experienced an unprovoked seizure.  Because her MRI findings have resolved, she would be at a low likelihood of having any additional seizures and would not warrant treatment with a seizure medication at this point.  Unless she would have an unprovoked seizure, no changes or additional testing will be necessary.    --She has not been driving since her seizure.  I would support her being able to return to driving at this point since her seizure was a single provoked seizure, as above.  I will fill out a  seizure reporting form today.     I spent a total of 60 min with the patient, reviewing her chart, reviewing her imaging, and completing documentation on the day of the encounter. This time was spent specifically discussing her diagnosis, follow up, imaging, and plan as detailed above     She will Return if symptoms worsen or fail to improve.    History of Present Illness     HPI   Ana Banuelos is a 24 y.o. female with PRES in setting of severe high BP and post-partum resulting in a single seizure, who is presenting to Neurology office for evaluation of her seizure and follow up of her hospitalization for PRES.    Current medications as per Epic (only taking Lexapro 10 mg daily).     Briefly reviewing her history, she delivered her baby on 7/28/2024. She had post-partum depression and started Zoloft the night prior to her seizure. She was having nausea and has a pretty sudden headache. In the middle of the night, on 8/6/2024, her  woke up to her shaking all over, gurgling and being unconscious. She had bitten her tongue. He estimates she was shaking for maybe a minute or so. She was a little confused after, but does remember waking up at home. Family called 911 and she was taken to the hospital.     When she arrived in the emergency department, her blood pressure was noted to be quite elevated, at 173/101. While in the hospital, she was treated with Levetiracetam.  She had an MRI of her brain which did show T2 hyperintensities mainly in her bilateral occipital lobes, but also with some in her bilateral frontal and parietal lobes, right greater than left.  Her findings were felt to be due to PRES and she was discharged on levetiracetam and with a follow-up MRI.    After discharge from the hospital, she had a repeat MRI on 9/10/2024, which was normal showing resolution of her previously seen T2 hyperintense lesions.  She was subsequently taken off of levetiracetam at that point.    She has not had any  additional seizures since that time.  She denies any seizures prior to August including when she was young child.  She has overall been doing fine since that time, and has been following with her PCP.  Blood pressures have been normal since that time.  She denies any other symptoms, specifically denying issues with numbness, weakness, nausea, headaches, or other neurologic symptoms    Special Features  Status epilepticus: no  Self Injury Seizures: no  Precipitating Factors: PRES    Epilepsy Risk Factors:  Abnormal pregnancy:    no  Abnormal birth/:   no  Abnormal Development:   no  Febrile seizures, simple:   no  Febrile seizures, complex:   no  CNS infection:    no  Intellectual disability:    no  Cerebral palsy:    no  Head injury (moderate/severe):  no  CNS neoplasm:    no  CNS malformation:    no  Neurosurgical procedure:   no  Stroke:     no  Alcohol abuse:    no  Drug abuse:     no  Family history Sz/epilepsy:   yes:  cousin with cerebral palsy and epilepsy    Prior AEDs:  Levetiracetam (just treated with temporarily around hospitalization until MRI was cleared)    Prior Evaluation:  - MRI brain: 2024: Multiple gyriform areas of vasogenic edema involving the cortex and subcortical white matter of the right greater than left frontal lobes and parietal lobes, and involving the parafalcine bilateral occipital lobes, which likely represent a posterior reversible encephalopathy syndrome or alternatively reversible cerebrovascular constriction syndrome/ (RCVS), or less likely sequela of recent seizures.  Punctate foci of diffusion restriction involving the right frontal lobe, and right parietal lobe, in the areas of cortical/subcortical vasogenic edema, which may represent T2 shine through, or alternatively punctate subacute infarcts in these regions  - MRI brain with contrast: 2024: No abnormal parenchymal or extra-axial enhancement.   - MRI brain 9/10/2024: normal  - Routine EEG: none  - Ambulatory  "EEG: none  - Video EEG: none  - PET scan brain : none  - Neuropsychologic testing: none    Psychiatric History:  Depression: yes  Anxiety: no  Psychosis: no  Psychiatric Admissions: no    Her history was also obtained from her , who was present at today's visit.      I personally reviewed her MRI brain from 8/6/2024 and 9/10/2024, as detailed above.    I reviewed prior hospital notes, as documented in Epic/[employer], and summarized above.     The following portions of the patient's history were reviewed and updated as appropriate: allergies, current medications, past family history, past medical history, past social history, past surgical history, and problem list.    Review of Systems    I personally reviewed the ROS that was entered by the medical assistant in a separate note    Objective     /62 (BP Location: Left arm, Patient Position: Sitting, Cuff Size: Standard)   Pulse 75   Ht 5' 4\" (1.626 m)   Wt 61.6 kg (135 lb 11.2 oz)   BMI 23.29 kg/m²    Physical Exam  Neurologic Exam  GENERAL EXAMINATION:   In general patient is well appearing and in no distress.  There is no peripheral edema.    NEUROLOGIC EXAMINATION:     Alert and oriented to person, date, location. Fund of knowledge is full with good understanding of medical situation.  Recent and remote memory were intact    Mood and affect are appropriate. Attention is intact.    Language function including fluency, naming, and comprehension intact.    Cranial nerves: Pupils are equal round reactive to light and accommodation.  Visual Fields are full to confrontation bilaterally. Extraocular movements are intact without nystagmus. Facial sensation is intact to light touch. No facial droop, face activates symmetrically. There is no dysarthria. Hearing was intact to finger rub. Tongue and uvula are midline and palate elevates symmetrically.  Shoulder shrug  5/5.    Motor Exam:  No pronator drift. Bulk and tone are normal. Strength is 5/5 " throughout.    Deep tendon reflexes: Biceps 2+, brachioradialis 2+, patellar 2+, Achilles 2+ bilaterally.     Sensation: Intact light touch    Coordination: Finger nose finger and heel to shin testing are without dysmetria.     Gait: Negative romberg. Normal casual gait.     Administrative Statements       Voice recognition software was used in the generation of this note. There may be unintentional errors including grammatical errors, spelling errors, or pronoun errors.

## 2025-01-20 NOTE — ASSESSMENT & PLAN NOTE
The event that she experienced on 8/6/2024 is consistent with a seizure, but would be a provoked seizure because of PRES.  At this point she does not meet criteria for epilepsy since she has not experienced an unprovoked seizure.  Because her MRI findings have resolved, she would be at a low likelihood of having any additional seizures and would not warrant treatment with a seizure medication at this point.  Unless she would have an unprovoked seizure, no changes or additional testing will be necessary.    --She has not been driving since her seizure.  I would support her being able to return to driving at this point since her seizure was a single provoked seizure, as above.  I will fill out a seizure reporting form today.   Principal Discharge DX:	Chest pain, unspecified type

## 2025-01-20 NOTE — ASSESSMENT & PLAN NOTE
Overall reviewing her imaging and clinical history, the previously seen T2 hyperintense lesions are most consistent with posterior reversible encephalopathy syndrome.  She has had resolution of these findings on repeat MRI. PRES is commonly caused by severe high blood pressure, which was the case when she initially presented to the hospital.  Because she has now since completely resolved and is not having any other issues, it is unlikely that this would recur unless she would have severe high blood pressure again.    --At this point I would not recommend any additional testing since her MRI findings have resolved and she has been asymptomatic.  Her neurologic exam is normal.    --If she would plan future pregnancies, it would be very important to monitor her blood pressures very closely to assure that these do not become elevated, as severe high blood pressure could trigger PRES again.

## 2025-01-30 ENCOUNTER — TELEPHONE (OUTPATIENT)
Age: 25
End: 2025-01-30

## 2025-01-30 ENCOUNTER — PATIENT MESSAGE (OUTPATIENT)
Dept: NEUROLOGY | Facility: CLINIC | Age: 25
End: 2025-01-30

## 2025-01-30 NOTE — TELEPHONE ENCOUNTER
Patient has additional paperwork from [employer] regarding testing and wants to know how she can get that to the doctor.  Please assist.

## 2025-02-17 ENCOUNTER — OFFICE VISIT (OUTPATIENT)
Dept: URGENT CARE | Facility: CLINIC | Age: 25
End: 2025-02-17
Payer: COMMERCIAL

## 2025-02-17 VITALS
TEMPERATURE: 98.1 F | OXYGEN SATURATION: 96 % | SYSTOLIC BLOOD PRESSURE: 114 MMHG | DIASTOLIC BLOOD PRESSURE: 56 MMHG | HEART RATE: 94 BPM | RESPIRATION RATE: 16 BRPM

## 2025-02-17 DIAGNOSIS — A08.4 VIRAL GASTROENTERITIS: Primary | ICD-10-CM

## 2025-02-17 PROCEDURE — 99213 OFFICE O/P EST LOW 20 MIN: CPT | Performed by: NURSE PRACTITIONER

## 2025-02-17 RX ORDER — DICYCLOMINE HCL 20 MG
20 TABLET ORAL EVERY 6 HOURS
Qty: 20 TABLET | Refills: 0 | Status: SHIPPED | OUTPATIENT
Start: 2025-02-17

## 2025-02-17 NOTE — PROGRESS NOTES
[facility]        NAME: Ana Banuelos is a 24 y.o. female  : 2000    MRN: [id number]  DATE: 2025  TIME: 6:32 PM    Assessment and Plan   Viral gastroenteritis [A08.4]  1. Viral gastroenteritis  dicyclomine (BENTYL) 20 mg tablet            Patient Instructions     Patient Instructions   --Rest and drink plenty of fluids.  Milk, soda, and overly sugar or acidic beverages should be avoided, however. Plain water is acceptable, but dilute juice or a sports/electrolyte drink is preferred.   --As tolerated, begin to eat small amounts of bland solids such as crackers, bread, rice, pasta, bananas, or yogurt.  Fried, spicy, greasy, and overly acidic foods (such as tomato sauce) should be avoided for now.  If you throw up after eating, wait at least 3-4 hours before making another attempt.   --OTC Pepto-bismol may help with stomach discomfort, although it will likely turn your stools black.  It should also be avoided in persons under age 18 or those with an aspirin allergy.   --A prescription anti-spasmodic medication, such as Bentyl or Levbid, may be provided.  This can help with stomach cramps and loose stools.  Potential side effects include dry mouth and constipation.   --A prescription anti-nausea medication, such as Zofran, may also be provided.    --OTC Immodium should be avoided.  Although it will help decrease the amount of diarrhea, it has a tendency to keep the virus in your system longer, therefore prolonging the infection.  Frequent use may also lead to rebound constipation.  --Expect symptoms to last 3-5 days on average, followed by gradual improvement. It takes time for the virus to leave your system and for your GI tract to heal and resume normal functioning.   --Monitor for signs and symptoms of dehydration including increased thirst, dry mouth, lightheadedness, as well as dark/infrequent/small amounts of urination.  Should these occur, increase the amount of fluids you  "are drinking immediately.  Should these continue, you should go immediately to the ER as you will likely need IV fluids.   --Go to the ER if you experience increased abdominal pain, recurrent vomiting, significant blood in stool or emesis, or signs of dehydration (per above).   --Contact your PCP if your loose stools and stomach upset are still present after 7 days without showing signs of improvement.  At this time, further evaluation may be warranted including stool cultures.      If tests have been performed at [facility], our office will contact you with results if changes need to be made to the care plan discussed with you at the visit.  You can [facility] full [facility]    Chief Complaint     Chief Complaint   Patient presents with    Abdominal Pain     Nausea, vomiting, diarrhea, stomach ache after eating x 5 days oc tylenol         History of Present Illness       Here with complaints of intermittent upper mid-epigastric \"burning\", N/V/D starting 5 days ago.    No further nausea.  Last emesis was 4 days ago.    Epigastric discomfort, diarrhea improving some. Mainly after eating . No pain at present.    No radiation of epigastric pain to remainder of abdomen, chest, elsewhere.    No fever/chills, URI symptoms.   No melena, hematochezia.    Drinking, urinating adequately.  No urinary symptoms.    No OTC meds taken. .   No known food or infectious precipitants.  No recent alcohol, travel, antibiotic.   Denies underlying GI conditions including IBS, PUD.   Denies pregnancy.  LMP 1/15.          Review of Systems   Review of Systems   Constitutional:  Negative for fever.   HENT:  Negative for sore throat.    Respiratory:  Negative for cough.    Gastrointestinal:  Positive for abdominal pain and diarrhea. Negative for abdominal distention, nausea and vomiting.         Current Medications       Current Outpatient Medications:     dicyclomine (BENTYL) 20 mg tablet, Take 1 tablet (20 mg total) by " mouth every 6 (six) hours, Disp: 20 tablet, Rfl: 0    escitalopram (LEXAPRO) 10 mg tablet, Take 1 tablet (10 mg total) by mouth daily, Disp: 30 tablet, Rfl: 0    Current Allergies     Allergies as of 2025    (No Known Allergies)            The following portions of the patient's history were reviewed and updated as appropriate: allergies, current medications, past family history, past medical history, past social history, past surgical history and problem list.     Past Medical History:   Diagnosis Date    Anxiety     Asthma     childhood    Depression        Past Surgical History:   Procedure Laterality Date    [place]  DELIVERY ONLY N/A 2024    Procedure:  SECTION ();  Surgeon: Seema Last MD;  Location: [facility];  Service: Obstetrics       Family History   Problem Relation Age of Onset    Cancer Mother         Cervical    Asthma Paternal Grandmother     Diabetes Paternal Grandmother     Hypertension Paternal Grandmother     Stroke Paternal Grandfather 80    Deep vein thrombosis Neg Hx     Pulmonary embolism Neg Hx          Medications have been verified.        Objective   /56   Pulse 94   Temp 98.1 °F (36.7 °C)   Resp 16   SpO2 96%   No LMP recorded.       Physical Exam     Physical Exam  Constitutional:       General: She is not in acute distress.  Eyes:      General: No scleral icterus.  Cardiovascular:      Rate and Rhythm: Normal rate and regular rhythm.   Pulmonary:      Effort: Pulmonary effort is normal.      Breath sounds: Normal breath sounds.   Abdominal:      General: Abdomen is flat. Bowel sounds are normal. There is no distension.      Palpations: Abdomen is soft. There is no mass.      Tenderness: There is no abdominal tenderness. There is no right CVA tenderness, left CVA tenderness, guarding or rebound.      Hernia: No hernia is present.      Comments: Mild upper mid-epigastric tenderness. Nontender elsewhere. Negative Thornton.     Neurological:       Mental Status: She is alert.

## 2025-02-17 NOTE — PATIENT INSTRUCTIONS
--Rest and drink plenty of fluids.  Milk, soda, and overly sugar or acidic beverages should be avoided, however. Plain water is acceptable, but dilute juice or a sports/electrolyte drink is preferred.   --As tolerated, begin to eat small amounts of bland solids such as crackers, bread, rice, pasta, bananas, or yogurt.  Fried, spicy, greasy, and overly acidic foods (such as tomato sauce) should be avoided for now.  If you throw up after eating, wait at least 3-4 hours before making another attempt.   --OTC Pepto-bismol may help with stomach discomfort, although it will likely turn your stools black.  It should also be avoided in persons under age 18 or those with an aspirin allergy.   --A prescription anti-spasmodic medication, such as Bentyl or Levbid, may be provided.  This can help with stomach cramps and loose stools.  Potential side effects include dry mouth and constipation.   --A prescription anti-nausea medication, such as Zofran, may also be provided.    --OTC Immodium should be avoided.  Although it will help decrease the amount of diarrhea, it has a tendency to keep the virus in your system longer, therefore prolonging the infection.  Frequent use may also lead to rebound constipation.  --Expect symptoms to last 3-5 days on average, followed by gradual improvement. It takes time for the virus to leave your system and for your GI tract to heal and resume normal functioning.   --Monitor for signs and symptoms of dehydration including increased thirst, dry mouth, lightheadedness, as well as dark/infrequent/small amounts of urination.  Should these occur, increase the amount of fluids you are drinking immediately.  Should these continue, you should go immediately to the ER as you will likely need IV fluids.   --Go to the ER if you experience increased abdominal pain, recurrent vomiting, significant blood in stool or emesis, or signs of dehydration (per above).   --Contact your PCP if your loose stools and  stomach upset are still present after 7 days without showing signs of improvement.  At this time, further evaluation may be warranted including stool cultures.

## 2025-02-18 ENCOUNTER — APPOINTMENT (EMERGENCY)
Dept: CT IMAGING | Facility: HOSPITAL | Age: 25
End: 2025-02-18
Payer: COMMERCIAL

## 2025-02-18 ENCOUNTER — HOSPITAL ENCOUNTER (EMERGENCY)
Facility: HOSPITAL | Age: 25
Discharge: HOME | End: 2025-02-19
Attending: EMERGENCY MEDICINE | Admitting: EMERGENCY MEDICINE
Payer: COMMERCIAL

## 2025-02-18 DIAGNOSIS — R10.13 EPIGASTRIC PAIN: Primary | ICD-10-CM

## 2025-02-18 DIAGNOSIS — R19.7 DIARRHEA, UNSPECIFIED TYPE: ICD-10-CM

## 2025-02-18 DIAGNOSIS — N39.0 UTI (URINARY TRACT INFECTION): ICD-10-CM

## 2025-02-18 DIAGNOSIS — R11.2 NAUSEA AND VOMITING, UNSPECIFIED VOMITING TYPE: ICD-10-CM

## 2025-02-18 LAB
ALBUMIN SERPL BCG-MCNC: 4.4 G/DL (ref 3.5–5)
ALP SERPL-CCNC: 65 U/L (ref 34–104)
ALT SERPL W P-5'-P-CCNC: 8 U/L (ref 7–52)
ANION GAP SERPL CALCULATED.3IONS-SCNC: 10 MMOL/L (ref 4–13)
AST SERPL W P-5'-P-CCNC: 12 U/L (ref 13–39)
BILIRUB SERPL-MCNC: 0.78 MG/DL (ref 0.2–1)
BUN SERPL-MCNC: 12 MG/DL (ref 5–25)
CALCIUM SERPL-MCNC: 9.4 MG/DL (ref 8.4–10.2)
CHLORIDE SERPL-SCNC: 104 MMOL/L (ref 96–108)
CO2 SERPL-SCNC: 22 MMOL/L (ref 21–32)
CREAT SERPL-MCNC: 0.54 MG/DL (ref 0.6–1.3)
EXT PREGNANCY TEST URINE: NEGATIVE
EXT. CONTROL: NORMAL
GFR SERPL CREATININE-BSD FRML MDRD: 130 ML/MIN/1.73SQ M
GLUCOSE SERPL-MCNC: 110 MG/DL (ref 65–140)
LIPASE SERPL-CCNC: 13 U/L (ref 11–82)
POTASSIUM SERPL-SCNC: 3.2 MMOL/L (ref 3.5–5.3)
PROT SERPL-MCNC: 7.2 G/DL (ref 6.4–8.4)
SODIUM SERPL-SCNC: 136 MMOL/L (ref 135–147)

## 2025-02-18 PROCEDURE — 81025 URINE PREGNANCY TEST: CPT

## 2025-02-18 PROCEDURE — 83690 ASSAY OF LIPASE: CPT

## 2025-02-18 PROCEDURE — 93005 ELECTROCARDIOGRAM TRACING: CPT

## 2025-02-18 PROCEDURE — 87505 NFCT AGENT DETECTION GI: CPT

## 2025-02-18 PROCEDURE — 96361 HYDRATE IV INFUSION ADD-ON: CPT

## 2025-02-18 PROCEDURE — 96375 TX/PRO/DX INJ NEW DRUG ADDON: CPT

## 2025-02-18 PROCEDURE — 87086 URINE CULTURE/COLONY COUNT: CPT

## 2025-02-18 PROCEDURE — 81001 URINALYSIS AUTO W/SCOPE: CPT

## 2025-02-18 PROCEDURE — 85025 COMPLETE CBC W/AUTO DIFF WBC: CPT

## 2025-02-18 PROCEDURE — 74177 CT ABD & PELVIS W/CONTRAST: CPT

## 2025-02-18 PROCEDURE — 99285 EMERGENCY DEPT VISIT HI MDM: CPT

## 2025-02-18 PROCEDURE — 80053 COMPREHEN METABOLIC PANEL: CPT

## 2025-02-18 PROCEDURE — 36415 COLL VENOUS BLD VENIPUNCTURE: CPT

## 2025-02-18 PROCEDURE — 99285 EMERGENCY DEPT VISIT HI MDM: CPT | Performed by: EMERGENCY MEDICINE

## 2025-02-18 RX ORDER — DICYCLOMINE HCL 20 MG
20 TABLET ORAL ONCE
Status: COMPLETED | OUTPATIENT
Start: 2025-02-18 | End: 2025-02-18

## 2025-02-18 RX ORDER — ONDANSETRON 2 MG/ML
4 INJECTION INTRAMUSCULAR; INTRAVENOUS ONCE
Status: COMPLETED | OUTPATIENT
Start: 2025-02-18 | End: 2025-02-18

## 2025-02-18 RX ORDER — POTASSIUM CHLORIDE 1500 MG/1
40 TABLET, EXTENDED RELEASE ORAL ONCE
Status: COMPLETED | OUTPATIENT
Start: 2025-02-18 | End: 2025-02-18

## 2025-02-18 RX ORDER — FAMOTIDINE 10 MG/ML
20 INJECTION, SOLUTION INTRAVENOUS ONCE
Status: COMPLETED | OUTPATIENT
Start: 2025-02-18 | End: 2025-02-18

## 2025-02-18 RX ADMIN — FAMOTIDINE 20 MG: 10 INJECTION INTRAVENOUS at 23:46

## 2025-02-18 RX ADMIN — DICYCLOMINE HYDROCHLORIDE 20 MG: 20 TABLET ORAL at 23:49

## 2025-02-18 RX ADMIN — ONDANSETRON 4 MG: 2 INJECTION INTRAMUSCULAR; INTRAVENOUS at 23:44

## 2025-02-18 RX ADMIN — SODIUM CHLORIDE 1000 ML: 0.9 INJECTION, SOLUTION INTRAVENOUS at 23:43

## 2025-02-18 RX ADMIN — POTASSIUM CHLORIDE 40 MEQ: 1500 TABLET, EXTENDED RELEASE ORAL at 23:49

## 2025-02-19 ENCOUNTER — RESULTS FOLLOW-UP (OUTPATIENT)
Dept: EMERGENCY DEPT | Facility: HOSPITAL | Age: 25
End: 2025-02-19

## 2025-02-19 VITALS
HEART RATE: 75 BPM | SYSTOLIC BLOOD PRESSURE: 119 MMHG | DIASTOLIC BLOOD PRESSURE: 74 MMHG | RESPIRATION RATE: 18 BRPM | TEMPERATURE: 98.5 F | OXYGEN SATURATION: 100 %

## 2025-02-19 LAB
ATRIAL RATE: 82 BPM
BACTERIA UR QL AUTO: ABNORMAL /HPF
BASOPHILS # BLD AUTO: 0.02 THOUSANDS/ΜL (ref 0–0.1)
BASOPHILS NFR BLD AUTO: 0 % (ref 0–1)
BILIRUB UR QL STRIP: NEGATIVE
C COLI+JEJUNI TUF STL QL NAA+PROBE: NEGATIVE
C DIFF TOX A+B STL QL IA: NEGATIVE
C DIFF TOX B TCDB STL QL NAA+PROBE: POSITIVE
CLARITY UR: ABNORMAL
COLOR UR: YELLOW
EC STX1+STX2 GENES STL QL NAA+PROBE: NEGATIVE
EOSINOPHIL # BLD AUTO: 0.99 THOUSAND/ΜL (ref 0–0.61)
EOSINOPHIL NFR BLD AUTO: 10 % (ref 0–6)
ERYTHROCYTE [DISTWIDTH] IN BLOOD BY AUTOMATED COUNT: 12.4 % (ref 11.6–15.1)
GLUCOSE UR STRIP-MCNC: NEGATIVE MG/DL
HCT VFR BLD AUTO: 42.5 % (ref 34.8–46.1)
HGB BLD-MCNC: 14.5 G/DL (ref 11.5–15.4)
HGB UR QL STRIP.AUTO: NEGATIVE
IMM GRANULOCYTES # BLD AUTO: 0.03 THOUSAND/UL (ref 0–0.2)
IMM GRANULOCYTES NFR BLD AUTO: 0 % (ref 0–2)
KETONES UR STRIP-MCNC: ABNORMAL MG/DL
LEUKOCYTE ESTERASE UR QL STRIP: ABNORMAL
LYMPHOCYTES # BLD AUTO: 1.45 THOUSANDS/ΜL (ref 0.6–4.47)
LYMPHOCYTES NFR BLD AUTO: 14 % (ref 14–44)
MCH RBC QN AUTO: 28.8 PG (ref 26.8–34.3)
MCHC RBC AUTO-ENTMCNC: 34.1 G/DL (ref 31.4–37.4)
MCV RBC AUTO: 85 FL (ref 82–98)
MONOCYTES # BLD AUTO: 0.6 THOUSAND/ΜL (ref 0.17–1.22)
MONOCYTES NFR BLD AUTO: 6 % (ref 4–12)
MUCOUS THREADS UR QL AUTO: ABNORMAL
NEUTROPHILS # BLD AUTO: 7.17 THOUSANDS/ΜL (ref 1.85–7.62)
NEUTS SEG NFR BLD AUTO: 70 % (ref 43–75)
NITRITE UR QL STRIP: NEGATIVE
NON-SQ EPI CELLS URNS QL MICRO: ABNORMAL /HPF
NRBC BLD AUTO-RTO: 0 /100 WBCS
P AXIS: 74 DEGREES
PH UR STRIP.AUTO: 8.5 [PH]
PLATELET # BLD AUTO: 200 THOUSANDS/UL (ref 149–390)
PMV BLD AUTO: 10.9 FL (ref 8.9–12.7)
PR INTERVAL: 154 MS
PROT UR STRIP-MCNC: ABNORMAL MG/DL
QRS AXIS: 53 DEGREES
QRSD INTERVAL: 84 MS
QT INTERVAL: 386 MS
QTC INTERVAL: 450 MS
RBC # BLD AUTO: 5.03 MILLION/UL (ref 3.81–5.12)
RBC #/AREA URNS AUTO: ABNORMAL /HPF
SALMONELLA SP SPAO STL QL NAA+PROBE: NEGATIVE
SHIGELLA SP+EIEC IPAH STL QL NAA+PROBE: NEGATIVE
SP GR UR STRIP.AUTO: 1.03 (ref 1–1.03)
T WAVE AXIS: 35 DEGREES
UROBILINOGEN UR STRIP-ACNC: <2 MG/DL
VENTRICULAR RATE: 82 BPM
WBC # BLD AUTO: 10.26 THOUSAND/UL (ref 4.31–10.16)
WBC #/AREA URNS AUTO: ABNORMAL /HPF

## 2025-02-19 PROCEDURE — 93010 ELECTROCARDIOGRAM REPORT: CPT | Performed by: INTERNAL MEDICINE

## 2025-02-19 PROCEDURE — 96365 THER/PROPH/DIAG IV INF INIT: CPT

## 2025-02-19 RX ORDER — CEFPODOXIME PROXETIL 200 MG/1
200 TABLET, FILM COATED ORAL 2 TIMES DAILY
Qty: 14 TABLET | Refills: 0 | Status: SHIPPED | OUTPATIENT
Start: 2025-02-19 | End: 2025-02-26

## 2025-02-19 RX ORDER — ONDANSETRON 4 MG/1
4 TABLET, FILM COATED ORAL EVERY 6 HOURS
Qty: 16 TABLET | Refills: 0 | Status: SHIPPED | OUTPATIENT
Start: 2025-02-19 | End: 2025-02-23

## 2025-02-19 RX ADMIN — IOHEXOL 50 ML: 240 INJECTION, SOLUTION INTRATHECAL; INTRAVASCULAR; INTRAVENOUS; ORAL at 00:00

## 2025-02-19 RX ADMIN — CEFTRIAXONE SODIUM 1000 MG: 10 INJECTION, POWDER, FOR SOLUTION INTRAVENOUS at 01:44

## 2025-02-19 RX ADMIN — IOHEXOL 90 ML: 350 INJECTION, SOLUTION INTRAVENOUS at 01:38

## 2025-02-19 NOTE — ED ATTENDING ATTESTATION
2025  I, Paul Nieves MD, saw and evaluated the patient. I have discussed the patient with the resident/non-physician practitioner and agree with the resident's/non-physician practitioner's findings, Plan of Care, and MDM as documented in the resident's/non-physician practitioner's note, except where noted. All available labs and Radiology studies were reviewed.  I was present for key portions of any procedure(s) performed by the resident/non-physician practitioner and I was immediately available to provide assistance.       At this point I agree with the current assessment done in the Emergency Department.  I have conducted an independent evaluation of this patient a history and physical is as follows: Patient is a 24 year old female with 1 week of intermittent worsening abdominal pain with N/V/D. No GI bleeding. No fever. No travel. No urinary sx. LMP - 25. No etoh use. No excess NSAID use. Has been using tylenol for pain. Has had prior . No known ill contacts. No raw meat, eggs, fish or recent abx use. Was last seen at [facility] in [place] on 25 for viral gastroenteritis. [facility] website checked on this patient and no Rx found. NCAT. No scleral icterus. Mucous membranes somewhat moist. Lungs clear. Heart regular without murmur. Abdomen with diffuse upper abdominal tenderness bilaterally. No guarding or rebound. (+) bowel sounds. No edema. No rash noted. No jaundice. DDx including but not limited to: appendicitis, gastroenteritis, gastritis, PUD, GERD, gastroparesis, hepatitis, pancreatitis, colitis, enteritis, food poisoning, mesenteric adenitis, epiploic appendagitis, IBD, IBS, ileus, bowel obstruction, volvulus, cholecystitis, biliary colic, choledocholithiasis, perforated viscus, tumor, splenic etiology, diverticulitis, internal hernia, constipation, pelvic pathology, renal colic, pyelonephritis, UTI. Doubt cardiac etiology. I reviewed EKG and labs. Will check CT and give IVFs  and IV pepcid and IV zofran and bentyl po.     ED Course         Critical Care Time  Procedures

## 2025-02-19 NOTE — DISCHARGE INSTRUCTIONS
You were seen in the Emergency Department for: Abdominal pain    Your workup today showed: Urinary tract infection    Your next steps should include: Schedule an appointment with gastroenterology, follow-up with your primary care in 1 week, complete 7-day course of antibiotics for urinary tract infection, start Prilosec to help with abdominal pain, take Zofran as needed for nausea    Reasons to RETURN IMMEDIATELY to the Emergency Department: You develop worsening pain that is not controlled with home medications, you are unable to eat despite taking Zofran for your nausea, you develop fever or develop any new or worsening symptoms that concern you

## 2025-02-19 NOTE — ED PROVIDER NOTES
Time reflects when diagnosis was documented in both MDM as applicable and the Disposition within this note       Time User Action Codes Description Comment    2/19/2025  1:30 AM Lilian Schmid Add [R10.13] Epigastric pain     2/19/2025  1:30 AM Lilian Schmid Add [R11.2] Nausea and vomiting, unspecified vomiting type     2/19/2025  1:31 AM Lilian Schmid Add [R19.7] Diarrhea, unspecified type     2/19/2025  1:33 AM Paul Nieves Add [N39.0] UTI (urinary tract infection)     2/19/2025  2:06 AM Sorin Yadira Add [F53.0] Postpartum depression           ED Disposition       ED Disposition   Discharge    Condition   Stable    Date/Time   Wed Feb 19, 2025  2:00 AM    Comment   Ana Banuelos discharge to home/self care.                   Assessment & Plan       Medical Decision Making  Patient is a 24-year-old female with noncontributory past medical history presenting for complaint of epigastric pain, vomiting, nausea that began approximately 6 days ago.  Patient had 1 episode of vomiting on Thursday of last week that was nonbloody nonbilious.  Since then she has had acute epigastric abdominal pain with eating that slightly resolves with Tylenol.  On exam patient has right upper quadrant pain, epigastric pain on deep palpation.  Vitals are stable.  Will give patient 1 L of normal saline bolus as she had decreased p.o. intake and give one-time IV Pepcid, IV Zofran. Will order CT abdomen pelvis, stool panel, C. difficile PCR.  Ordered CBC, CMP, lipase, ecg.    Amount and/or Complexity of Data Reviewed  Labs: ordered.  Radiology: ordered.    Risk  Prescription drug management.        ED Course as of 02/19/25 1124   Tue Feb 18, 2025   2349 EKG normal, lipase normal, CMP with decreased potassium; repleted.   Wed Feb 19, 2025   0051 Cbc with elevated wbc at 10.26, otherwise normal.  Updated patient with lab findings in the room waiting for CT abdomen.  She reports her pain is much improved but says that this is usual and  her pain would return if she tried to eat anything.   0129 Urine microscopic with 10-20 white blood cells and occasional bacteria.  Will give one-time dose of ceftriaxone and plan on treating as UTI.  Pending CT abdomen pelvis.   0145 Patient signed out to Dr Yadira Rowell before final disposition.,       Medications   sodium chloride 0.9 % bolus 1,000 mL (0 mL Intravenous Stopped 25 0045)   dicyclomine (BENTYL) tablet 20 mg (20 mg Oral Given 25)   Famotidine (PF) (PEPCID) injection 20 mg (20 mg Intravenous Given 25 2346)   ondansetron (ZOFRAN) injection 4 mg (4 mg Intravenous Given 25)   potassium chloride (Klor-Con M20) CR tablet 40 mEq (40 mEq Oral Given 25)   iohexol (OMNIPAQUE) 240 MG/ML solution 50 mL (50 mL Oral Given 25 0000)   ceftriaxone (ROCEPHIN) 1 g/50 mL in dextrose IVPB (0 mg Intravenous Stopped 25 0220)   iohexol (OMNIPAQUE) 350 MG/ML injection (MULTI-DOSE) 90 mL (90 mL Intravenous Given 25 0138)       ED Risk Strat Scores                                                History of Present Illness       Chief Complaint   Patient presents with    Vomiting     Pt c/o N/V/D for the last week as well as some epigastric pain that worsens with eating. Ulcer hx, pt reports this pain feels the same. No hx gallbladder issues. Pt took tylenol at 9pm but threw it up. Denies any blood in vomit or stool.     Abdominal Pain       Past Medical History:   Diagnosis Date    Anxiety     Asthma     childhood    Depression       Past Surgical History:   Procedure Laterality Date    MI  DELIVERY ONLY N/A 2024    Procedure:  SECTION ();  Surgeon: Seema Last MD;  Location: [facility];  Service: Obstetrics      Family History   Problem Relation Age of Onset    Cancer Mother         Cervical    Asthma Paternal Grandmother     Diabetes Paternal Grandmother     Hypertension Paternal Grandmother     Stroke Paternal Grandfather 80    Deep vein  "thrombosis Neg Hx     Pulmonary embolism Neg Hx       Social History     Tobacco Use    Smoking status: Never    Smokeless tobacco: Never   Vaping Use    Vaping status: Never Used   Substance Use Topics    Alcohol use: Not Currently    Drug use: Never      E-Cigarette/Vaping    E-Cigarette Use Never User       E-Cigarette/Vaping Substances      I have reviewed and agree with the history as documented.     Patient is a 24-year-old female with noncontributory past medical history presenting for complaint of epigastric pain, vomiting, nausea that began approximately 6 days ago.  Patient had 1 episode of vomiting on Thursday of last week that was nonbloody nonbilious.  Since then she has had acute epigastric abdominal pain with eating that slightly resolves with Tylenol.  Patient has had decreased p.o. intake secondary to this and admits that she has had decreased fluid intake as well.  Patient was evaluated at urgent care yesterday for symptoms and was prescribed Bentyl which she has not tried taking.  She was advised that she has a viral gastroenteritis and discharged back home.  Patient had acute abdominal pain with eating dinner tonight and had 1 episode of vomiting, nonbloody nonbilious.  She notes some loose stools in the past few days but tonight had 1 episode of diarrhea.  She endorses mild chills but no fever, no chest pain, no shortness of breath, no other complaints at this time.  Patient notes improved abdominal pain after bowel movement.  Patient notes history of \"ulcer\" and says June 2024 was evaluated in urgent care for epigastric pain similar to this and was prescribed with a course of antibiotics that resolved her pain.  She has never been evaluated by a gastroenterologist and  has never had an endoscopy before.      Vomiting  Associated symptoms: abdominal pain, chills and diarrhea    Associated symptoms: no arthralgias, no cough, no fever and no sore throat    Abdominal Pain  Associated symptoms: " chills, diarrhea, nausea and vomiting    Associated symptoms: no chest pain, no cough, no dysuria, no fever, no hematuria, no shortness of breath and no sore throat        Review of Systems   Constitutional:  Positive for appetite change and chills. Negative for fever.   HENT:  Negative for ear pain and sore throat.    Eyes:  Negative for pain and visual disturbance.   Respiratory:  Negative for cough and shortness of breath.    Cardiovascular:  Negative for chest pain and palpitations.   Gastrointestinal:  Positive for abdominal pain, diarrhea, nausea and vomiting.   Genitourinary:  Negative for dysuria and hematuria.   Musculoskeletal:  Negative for arthralgias and back pain.   Skin:  Negative for color change and rash.   Neurological:  Negative for seizures and syncope.   All other systems reviewed and are negative.          Objective       ED Triage Vitals [02/18/25 2255]   Temperature Pulse Blood Pressure Respirations SpO2 Patient Position - Orthostatic VS   98.5 °F (36.9 °C) 82 140/77 20 100 % --      Temp Source Heart Rate Source BP Location FiO2 (%) Pain Score    Oral -- Right arm -- --      Vitals      Date and Time Temp Pulse SpO2 Resp BP Pain Score FACES Pain Rating User   02/19/25 0030 -- 75 100 % 18 119/74 -- -- KB   02/18/25 2330 -- 77 99 % 20 123/59 -- -- KB   02/18/25 2255 98.5 °F (36.9 °C) 82 100 % 20 140/77 -- -- KB            Physical Exam  Vitals and nursing note reviewed.   Constitutional:       General: She is not in acute distress.     Appearance: She is well-developed.   HENT:      Head: Normocephalic and atraumatic.   Eyes:      Conjunctiva/sclera: Conjunctivae normal.   Cardiovascular:      Rate and Rhythm: Normal rate and regular rhythm.      Heart sounds: No murmur heard.  Pulmonary:      Effort: Pulmonary effort is normal. No respiratory distress.      Breath sounds: Normal breath sounds.   Abdominal:      Palpations: Abdomen is soft.      Tenderness: There is abdominal tenderness in the  right upper quadrant and epigastric area. Positive signs include Thornton's sign.   Musculoskeletal:         General: No swelling.      Cervical back: Neck supple.   Skin:     General: Skin is warm and dry.      Capillary Refill: Capillary refill takes less than 2 seconds.   Neurological:      Mental Status: She is alert.   Psychiatric:         Mood and Affect: Mood normal.         Results Reviewed       Procedure Component Value Units Date/Time    Urine Microscopic [[id number]]  (Abnormal) Collected: 02/18/25 2351    Lab Status: Final result Specimen: Urine, Clean Catch Updated: 02/19/25 0109     RBC, UA 1-2 /hpf      WBC, UA 10-20 /hpf      Epithelial Cells Occasional /hpf      Bacteria, UA Occasional /hpf      MUCUS THREADS Moderate    Urine culture [[id number]] Collected: 02/18/25 2351    Lab Status: In process Specimen: Urine, Clean Catch Updated: 02/19/25 0109    UA w Reflex to Microscopic w Reflex to Culture [[id number]]  (Abnormal) Collected: 02/18/25 2351    Lab Status: Final result Specimen: Urine, Clean Catch Updated: 02/19/25 0107     Color, UA Yellow     Clarity, UA Turbid     Specific Gravity, UA 1.029     pH, UA 8.5     Leukocytes, UA Large     Nitrite, UA Negative     Protein, UA 30 (1+) mg/dl      Glucose, UA Negative mg/dl      Ketones, UA 40 (2+) mg/dl      Urobilinogen, UA <2.0 mg/dl      Bilirubin, UA Negative     Occult Blood, UA Negative    CBC and differential [[id number]]  (Abnormal) Collected: 02/18/25 2304    Lab Status: Final result Specimen: Blood from Arm, Right Updated: 02/19/25 0017     WBC 10.26 Thousand/uL      RBC 5.03 Million/uL      Hemoglobin 14.5 g/dL      Hematocrit 42.5 %      MCV 85 fL      MCH 28.8 pg      MCHC 34.1 g/dL      RDW 12.4 %      MPV 10.9 fL      Platelets 200 Thousands/uL      nRBC 0 /100 WBCs      Segmented % 70 %      Immature Grans % 0 %      Lymphocytes % 14 %      Monocytes % 6 %      Eosinophils Relative 10 %      Basophils Relative 0 %      Absolute  Neutrophils 7.17 Thousands/µL      Absolute Immature Grans 0.03 Thousand/uL      Absolute Lymphocytes 1.45 Thousands/µL      Absolute Monocytes 0.60 Thousand/µL      Eosinophils Absolute 0.99 Thousand/µL      Basophils Absolute 0.02 Thousands/µL     Stool Enteric Bacterial Panel by PCR [[id number]] Collected: 02/18/25 2350    Lab Status: In process Specimen: Stool from Rectum Updated: 02/19/25 0000    Clostridium difficile toxin by PCR with EIA [[id number]] Collected: 02/18/25 2350    Lab Status: In process Specimen: Stool from Per Rectum Updated: 02/19/25 0000    POCT pregnancy, urine [[id number]]  (Normal) Collected: 02/18/25 2343    Lab Status: Final result Updated: 02/18/25 2346     EXT Preg Test, Ur Negative     Control Valid    Comprehensive metabolic panel [[id number]]  (Abnormal) Collected: 02/18/25 2304    Lab Status: Final result Specimen: Blood from Arm, Right Updated: 02/18/25 2324     Sodium 136 mmol/L      Potassium 3.2 mmol/L      Chloride 104 mmol/L      CO2 22 mmol/L      ANION GAP 10 mmol/L      BUN 12 mg/dL      Creatinine 0.54 mg/dL      Glucose 110 mg/dL      Calcium 9.4 mg/dL      AST 12 U/L      ALT 8 U/L      Alkaline Phosphatase 65 U/L      Total Protein 7.2 g/dL      Albumin 4.4 g/dL      Total Bilirubin 0.78 mg/dL      eGFR 130 ml/min/1.73sq m     Narrative:      National Kidney Disease Foundation guidelines for Chronic Kidney Disease (CKD):     Stage 1 with normal or high GFR (GFR > 90 mL/min/1.73 square meters)    Stage 2 Mild CKD (GFR = 60-89 mL/min/1.73 square meters)    Stage 3A Moderate CKD (GFR = 45-59 mL/min/1.73 square meters)    Stage 3B Moderate CKD (GFR = 30-44 mL/min/1.73 square meters)    Stage 4 Severe CKD (GFR = 15-29 mL/min/1.73 square meters)    Stage 5 End Stage CKD (GFR <15 mL/min/1.73 square meters)  Note: GFR calculation is accurate only with a steady state creatinine    Lipase [[id number]]  (Normal) Collected: 02/18/25 2304    Lab Status: Final result Specimen: Blood from  Arm, Right Updated: 02/18/25 2324     Lipase 13 u/L             CT abdomen pelvis with contrast   ED Interpretation by Yadira Rowell MD (02/19 0218)   FINDINGS:     ABDOMEN     LOWER CHEST: No clinically significant abnormality in the visualized lower chest.     LIVER/BILIARY TREE: Unremarkable.     GALLBLADDER: No calcified gallstones. No pericholecystic inflammatory change.     SPLEEN: Unremarkable.     PANCREAS: Unremarkable.     ADRENAL GLANDS: Unremarkable.     KIDNEYS/URETERS: Unremarkable. No hydronephrosis.     STOMACH AND BOWEL: Unremarkable.     APPENDIX: Normal.     ABDOMINOPELVIC CAVITY: Small volume of free pelvic fluid, likely physiologic given the patient's age and gender. No pneumoperitoneum. No lymphadenopathy.     VESSELS: Unremarkable for patient's age.     PELVIS     REPRODUCTIVE ORGANS: Right ovarian corpus luteal cyst.     URINARY BLADDER: Unremarkable.     ABDOMINAL WALL/INGUINAL REGIONS: Unremarkable.     BONES: No acute fracture or suspicious osseous lesion.     IMPRESSION:     No acute findings in the abdomen or pelvis.        Workstation performed: [id number]        Final Interpretation by James Pennington DO (02/19 0156)      No acute findings in the abdomen or pelvis.         Workstation performed: [id number]             Procedures    ED Medication and Procedure Management   Prior to Admission Medications   Prescriptions Last Dose Informant Patient Reported? Taking?   dicyclomine (BENTYL) 20 mg tablet   No No   Sig: Take 1 tablet (20 mg total) by mouth every 6 (six) hours   escitalopram (LEXAPRO) 10 mg tablet   No No   Sig: Take 1 tablet (10 mg total) by mouth daily      Facility-Administered Medications: None     Discharge Medication List as of 2/19/2025  2:06 AM        START taking these medications    Details   cefpodoxime (VANTIN) 200 mg tablet Take 1 tablet (200 mg total) by mouth 2 (two) times a day for 7 days, Starting Wed 2/19/2025, Until Wed 2/26/2025, Normal      omeprazole  (PriLOSEC) 20 mg delayed release capsule Take 1 capsule (20 mg total) by mouth daily, Starting Wed 2/19/2025, Normal      ondansetron (ZOFRAN) 4 mg tablet Take 1 tablet (4 mg total) by mouth every 6 (six) hours for 4 days, Starting Wed 2/19/2025, Until Sun 2/23/2025, Normal           CONTINUE these medications which have NOT CHANGED    Details   dicyclomine (BENTYL) 20 mg tablet Take 1 tablet (20 mg total) by mouth every 6 (six) hours, Starting Mon 2/17/2025, Normal      escitalopram (LEXAPRO) 10 mg tablet Take 1 tablet (10 mg total) by mouth daily, Starting Tue 9/10/2024, Normal             ED SEPSIS DOCUMENTATION   Time reflects when diagnosis was documented in both MDM as applicable and the Disposition within this note       Time User Action Codes Description Comment    2/19/2025  1:30 AM Lilian Schmid [R10.13] Epigastric pain     2/19/2025  1:30 AM Lilian Schmid Add [R11.2] Nausea and vomiting, unspecified vomiting type     2/19/2025  1:31 AM Lilian Schmid Add [R19.7] Diarrhea, unspecified type     2/19/2025  1:33 AM Paul Nieves Add [N39.0] UTI (urinary tract infection)     2/19/2025  2:06 AM Yadira Rowell Add [F53.0] Postpartum depression                  Lilian Schmid DO  02/19/25 1125

## 2025-02-19 NOTE — ED CARE HANDOFF
Emergency Department Sign Out Note        Sign out and transfer of care from Dr.Gazal Schmid . See Separate Emergency Department note.     The patient, Ana Banuelos, was evaluated by the previous provider for abd pain.    Workup Completed:  Pepcid, zofran, bentyl  CBC, CMP, urine preg, UA    ED Course / Workup Pending (followup):  Hx of ulcers, Never had EGD done  Post prandial epigastric pain, non bilious-non-bloody emesis  No fever, some chills  Urgent care viral gastroenteritis    UA showing UTI  CT abd/pelvis negative        ED Course as of 02/19/25 0334   Wed Feb 19, 2025   0138 WBC, UA(!): 10-20  One time dose of ceftriaxone     Procedures  Medical Decision Making  Amount and/or Complexity of Data Reviewed  Labs: ordered. Decision-making details documented in ED Course.  Radiology: ordered. Decision-making details documented in ED Course.    Risk  Prescription drug management.      Patient's imaging negative, but UA is positive for UTI.  Will prescribe 7-day course of antibiotics for treatment.  Patient states she has a history of ulcers, but has not had an EGD done before.  Placed referral for GI for possible EGD.  Time, will start patient on Prilosec and prescribe patient Zofran for nausea    Dispo: discharge to home  Prescriptions: Zofran, Cefpodoxime, Prilosec  Other: GI outpatient referral    Discussed results and plan with patient. Patient was agreeable and expressed understanding. Discussed return precautions.        Disposition  Final diagnoses:   Epigastric pain   Nausea and vomiting, unspecified vomiting type   Diarrhea, unspecified type   UTI (urinary tract infection)   Postpartum depression     Time reflects when diagnosis was documented in both MDM as applicable and the Disposition within this note       Time User Action Codes Description Comment    2/19/2025  1:30 AM Lilian Schmid Add [R10.13] Epigastric pain     2/19/2025  1:30 AM Lilian Schmid Add [R11.2] Nausea and vomiting, unspecified  vomiting type     2/19/2025  1:31 AM Sharmaine Lilian Add [R19.7] Diarrhea, unspecified type     2/19/2025  1:33 AM Paul Nieves Add [N39.0] UTI (urinary tract infection)     2/19/2025  2:06 AM SorinVyYadira Add [F53.0] Postpartum depression           ED Disposition       ED Disposition   Discharge    Condition   Stable    Date/Time   Wed Feb 19, 2025  2:00 AM    Comment   Ana Banuelos discharge to home/self care.                   Follow-up Information       Follow up With Specialties Details Why Contact Info Additional Information    Chelita Rodriguez,  Family Medicine Go in 1 week  [address] [place] [address]  [phone]        [facility] Emergency Department Emergency Medicine  As needed [address] [facility] [address]  [phone] [facility] Emergency Department, [address] [place], [place], [address]    [facility] [place] [facility] Schedule an appointment as soon as possible for a visit in 3 days  [address]  [address]  [facility] [address] [phone] [facility] [place], [address] [facility] [address], [place], [place], [address]   [phone]          Discharge Medication List as of 2/19/2025  2:06 AM        START taking these medications    Details   cefpodoxime (VANTIN) 200 mg tablet Take 1 tablet (200 mg total) by mouth 2 (two) times a day for 7 days, Starting Wed 2/19/2025, Until Wed 2/26/2025, Normal      omeprazole (PriLOSEC) 20 mg delayed release capsule Take 1 capsule (20 mg total) by mouth daily, Starting Wed 2/19/2025, Normal      ondansetron (ZOFRAN) 4 mg tablet Take 1 tablet (4 mg total) by mouth every 6 (six) hours for 4 days, Starting Wed 2/19/2025, Until Sun 2/23/2025, Normal           CONTINUE these medications which have NOT CHANGED    Details   dicyclomine (BENTYL) 20 mg tablet Take 1 tablet (20 mg total) by mouth  every 6 (six) hours, Starting Mon 2/17/2025, Normal      escitalopram (LEXAPRO) 10 mg tablet Take 1 tablet (10 mg total) by mouth daily, Starting Tue 9/10/2024, Normal                  ED Provider  Electronically Signed by     Yadira Rowell MD  02/19/25 8809

## 2025-02-21 LAB — BACTERIA UR CULT: NORMAL

## 2025-02-25 NOTE — PROGRESS NOTES
Name: Ana Banuelos      : 2000      MRN: [id number]  Encounter Provider: Shannon Pruitt DO  Encounter Date: 2025   Encounter department: [facility] [place]  :  Assessment & Plan  Dyspepsia  Patient presenting with complaints of epigastric abdominal pain.  Symptoms acutely started  and were associated with nausea/vomiting/diarrhea at that time.  Due to persistent pain she was seen in the ED on  and started on course of omeprazole 20 mg daily.  She has been taking this medication which has resulted in symptom relief.  Nausea/vomiting/diarrhea has since resolved but epigastric pain continues to persist.  Burning pain present with eating but no associated alarm symptoms.  At this time, symptoms likely secondary to gastritis versus PUD.  Given improvement with PPI therapy, recommend increase dosage and monitoring at this time.    Plan:  Increase omeprazole to 40 mg daily, advised to take PPI 30 - 60 min prior to meals - can increase to twice daily if needed  No indication for endoscopy at this time, can consider in future if symptoms persist  If pain persist, would also plan for RUQ US to rule out cholelithiasis given recent pregnancy  Recommend completion of H. pylori stool testing in the future when off PPI therapy  Schedule a follow-up visit in 3 months  Discussed the importance of lifestyle modifications including:  Recommend small meals and avoidance of ulcerogenic foods   Avoid eating prior to bedtime, elevate head of bed at night  Limit intake of alcohol and caffeine   Okay to utilize Tylenol but avoid use of all NSAIDs  Daily exercise to promote weight loss    Orders:    Ambulatory Referral to Gastroenterology    omeprazole (PriLOSEC) 20 mg delayed release capsule; Take 2 capsules (40 mg total) by mouth daily      History of Present Illness   Ana Banuelos is a 24 y.o. female with eclampsia and PRES who presents to the office today for new patient  consultation.    Patient recently evaluated in the ED on 2/19/2025 after presenting with a 6 day history of N/V and epigastric pain with loose stool (started 2/13/2025).  Pain worsened with eating and also worsened with palpation. Patient with decreased PO intake as a result. VS stable and labs largely unremarkable outside of mild leukocytosis of 10.26. UA positive for UTI. CT imaging with no acute findings. Patient ultimately was discharged from the ED. Prescribed 7 day course of Cefpodoxime for UTI as well as Zofran and Prilosec for her GI symptoms. Patient instructed to follow up with GI.     During today's visit, patient states that her symptoms have improved.  Admits to resolution in her nausea/vomiting and diarrhea.  Does state that her epigastric abdominal pain is improved but still present.  States the pain primarily occurring in the epigastric region which she describes as a burning pain when that occurs with eating.  Primarily occurs when she eats fatty or spicy foods.  Denies any nausea/vomiting or dysphagia.  No change in bowel habits including blood in stool.  She has been taking Prilosec 20 mg in the morning.  Denies any NSAID use.  No prior EGD/colonoscopy.  Does state that she had similar symptoms 4/2024 which resolved with Prilosec at that time.    HPI  History obtained from: patient  Review of Systems A complete review of systems is negative other than that noted above in the HPI.           Medical History Reviewed by provider this encounter:     .  Current Outpatient Medications on File Prior to Visit   Medication Sig Dispense Refill    cefpodoxime (VANTIN) 200 mg tablet Take 1 tablet (200 mg total) by mouth 2 (two) times a day for 7 days 14 tablet 0    dicyclomine (BENTYL) 20 mg tablet Take 1 tablet (20 mg total) by mouth every 6 (six) hours 20 tablet 0    escitalopram (LEXAPRO) 10 mg tablet Take 1 tablet (10 mg total) by mouth daily 30 tablet 0    [DISCONTINUED] omeprazole (PriLOSEC) 20 mg  delayed release capsule Take 1 capsule (20 mg total) by mouth daily 30 capsule 0    ondansetron (ZOFRAN) 4 mg tablet Take 1 tablet (4 mg total) by mouth every 6 (six) hours for 4 days 16 tablet 0     No current facility-administered medications on file prior to visit.      Social History     Tobacco Use    Smoking status: Never    Smokeless tobacco: Never   Vaping Use    Vaping status: Never Used   Substance and Sexual Activity    Alcohol use: Not Currently    Drug use: Never    Sexual activity: Not Currently     Partners: Male     Birth control/protection: None        Objective   There were no vitals taken for this visit.    Physical Exam  Constitutional:       General: She is not in acute distress.     Appearance: She is normal weight. She is not ill-appearing or toxic-appearing.   HENT:      Head: Normocephalic and atraumatic.      Mouth/Throat:      Mouth: Mucous membranes are moist.      Pharynx: Oropharynx is clear.   Eyes:      General: No scleral icterus.  Cardiovascular:      Rate and Rhythm: Normal rate.      Pulses: Normal pulses.   Pulmonary:      Effort: Pulmonary effort is normal.   Abdominal:      General: Abdomen is flat. There is distension (Mild tenderness to palpation of the epigastric region).   Musculoskeletal:         General: Normal range of motion.      Cervical back: Normal range of motion.   Skin:     General: Skin is warm.      Coloration: Skin is not jaundiced or pale.   Neurological:      Mental Status: She is alert and oriented to person, place, and time.   Psychiatric:         Mood and Affect: Mood normal.         Behavior: Behavior normal.            Lab Results: I personally reviewed relevant lab results. CBC/BMP: No new results in last 24 hours. , Creatinine Clearance: CrCl cannot be calculated (Patient's most recent lab result is older than the maximum 7 days allowed.)., LFTs: No new results in last 24 hours. , PTT/INR:No new results in last 24 hours.     Radiology Results Review:  I have reviewed the following images/report studies in PACS: No results found.       Administrative Statements   I have spent a total time of 30 minutes in caring for this patient on the day of the visit/encounter including Diagnostic results, Prognosis, Risks and benefits of tx options, Instructions for management, Patient and family education, Importance of tx compliance, Risk factor reductions, Impressions, Counseling / Coordination of care, Documenting in the medical record, Reviewing/placing orders in the medical record (including tests, medications, and/or procedures), and Obtaining or reviewing history  .

## 2025-02-26 ENCOUNTER — OFFICE VISIT (OUTPATIENT)
Dept: GASTROENTEROLOGY | Facility: CLINIC | Age: 25
End: 2025-02-26
Payer: COMMERCIAL

## 2025-02-26 VITALS
DIASTOLIC BLOOD PRESSURE: 74 MMHG | HEIGHT: 64 IN | WEIGHT: 132 LBS | BODY MASS INDEX: 22.53 KG/M2 | TEMPERATURE: 99 F | SYSTOLIC BLOOD PRESSURE: 112 MMHG

## 2025-02-26 DIAGNOSIS — R10.13 DYSPEPSIA: Primary | ICD-10-CM

## 2025-02-26 PROCEDURE — 99203 OFFICE O/P NEW LOW 30 MIN: CPT | Performed by: INTERNAL MEDICINE

## 2025-02-26 RX ORDER — OMEPRAZOLE 20 MG/1
40 CAPSULE, DELAYED RELEASE ORAL DAILY
Qty: 60 CAPSULE | Refills: 0 | Status: SHIPPED | OUTPATIENT
Start: 2025-02-26

## 2025-03-27 ENCOUNTER — OFFICE VISIT (OUTPATIENT)
Dept: FAMILY MEDICINE CLINIC | Facility: MEDICAL CENTER | Age: 25
End: 2025-03-27
Payer: COMMERCIAL

## 2025-03-27 VITALS
TEMPERATURE: 97.8 F | SYSTOLIC BLOOD PRESSURE: 112 MMHG | BODY MASS INDEX: 22.36 KG/M2 | DIASTOLIC BLOOD PRESSURE: 70 MMHG | OXYGEN SATURATION: 99 % | WEIGHT: 131 LBS | RESPIRATION RATE: 18 BRPM | HEART RATE: 76 BPM | HEIGHT: 64 IN

## 2025-03-27 DIAGNOSIS — Z00.00 ANNUAL PHYSICAL EXAM: Primary | ICD-10-CM

## 2025-03-27 DIAGNOSIS — E87.6 LOW SERUM POTASSIUM: ICD-10-CM

## 2025-03-27 PROCEDURE — 99385 PREV VISIT NEW AGE 18-39: CPT | Performed by: STUDENT IN AN ORGANIZED HEALTH CARE EDUCATION/TRAINING PROGRAM

## 2025-03-27 NOTE — PROGRESS NOTES
Adult Annual Physical  Name: Ana Banuelos      : 2000      MRN: [id number]  Encounter Provider: Dhiraj Orantes DO  Encounter Date: 3/27/2025   Encounter department: [facility] [place]    Assessment & Plan  Annual physical exam         Low serum potassium  Low potassium at ER visit, recheck BMP  Orders:    Basic metabolic panel; Future    Preventive Screenings:  - Diabetes Screening: screening up-to-date  - Cholesterol Screening: screening up-to-date   - Hepatitis C screening: screening up-to-date   - HIV screening: screening up-to-date   - Cervical cancer screening: screening up-to-date   - Colon cancer screening: screening not indicated   - Lung cancer screening: screening not indicated     No known family hx of colon cancer      Immunizations:  - Immunizations due: Influenza, HPV (Gardasil 9) and UTD    Counseling/Anticipatory Guidance:    - Dental health: discussed importance of regular tooth brushing, flossing, and dental visits.   - Diet: discussed recommendations for a healthy/well-balanced diet.   - Exercise: the importance of regular exercise/physical activity was discussed. Recommend exercise 3-5 times per week for at least 30 minutes.   - Injury prevention: discussed safety/seat belts, safety helmets, smoke detectors, carbon monoxide detectors, and smoking near bedding or upholstery.       Depression Screening and Follow-up Plan: Patient was screened for depression during today's encounter. They screened negative with a PHQ-9 score of 2.        Return in 1 year for annual physical and sooner as needed.     History of Present Illness     Ana Banuelos is a 24-year-old female who presents to [facility] care and for annual physical.  She has a history of postpartum depression, weaning down Lexapro and following with Psychiatry.  History of dyspepsia, currently following with GI, on PPI.  Provoked seizure, had consultation with neurology.        Adult Annual  Physical:  Patient presents for annual physical.     Diet and Physical Activity:  - Diet/Nutrition: consuming 3-5 servings of fruits/vegetables daily.  - Exercise: walking.    Depression Screening:    - PHQ-9 Score: 2    General Health:  - Sleep: 7-8 hours of sleep on average.  - Hearing: normal hearing bilateral ears.  - Vision: no vision problems.  - Dental: no dental visits for > 1 year, brushes teeth twice daily and does not floss.    /GYN Health:  - Follows with GYN: yes.   - Menopause: premenopausal.   - History of STDs: no        Review of Systems    As noted in HPI   Medical History Reviewed by provider this encounter:  Tobacco  Allergies  Meds  Problems  Med Hx  Surg Hx  Fam Hx     .  Past Medical History   Past Medical History:   Diagnosis Date    Anxiety     Asthma     childhood    Depression      Past Surgical History:   Procedure Laterality Date    NM  DELIVERY ONLY N/A 2024    Procedure:  SECTION ();  Surgeon: Seema Last MD;  Location: [facility];  Service: Obstetrics     Family History   Problem Relation Age of Onset    Cancer Mother         Cervical    Asthma Paternal Grandmother     Diabetes Paternal Grandmother     Hypertension Paternal Grandmother     Stroke Paternal Grandfather 80    Deep vein thrombosis Neg Hx     Pulmonary embolism Neg Hx       reports that she has never smoked. She has never used smokeless tobacco. She reports that she does not currently use alcohol. She reports that she does not use drugs.  Current Outpatient Medications   Medication Instructions    escitalopram (LEXAPRO) 10 mg, Oral, Daily    omeprazole (PRILOSEC) 40 mg, Oral, Daily   No Known Allergies   Current Outpatient Medications on File Prior to Visit   Medication Sig Dispense Refill    escitalopram (LEXAPRO) 10 mg tablet Take 1 tablet (10 mg total) by mouth daily 30 tablet 0    omeprazole (PriLOSEC) 20 mg delayed release capsule Take 2 capsules (40 mg total) by mouth daily 60  "capsule 0    [DISCONTINUED] dicyclomine (BENTYL) 20 mg tablet Take 1 tablet (20 mg total) by mouth every 6 (six) hours (Patient not taking: Reported on 3/27/2025) 20 tablet 0    [DISCONTINUED] ondansetron (ZOFRAN) 4 mg tablet Take 1 tablet (4 mg total) by mouth every 6 (six) hours for 4 days (Patient not taking: Reported on 3/27/2025) 16 tablet 0     No current facility-administered medications on file prior to visit.      Social History     Tobacco Use    Smoking status: Never    Smokeless tobacco: Never   Vaping Use    Vaping status: Never Used   Substance and Sexual Activity    Alcohol use: Not Currently    Drug use: Never    Sexual activity: Not Currently     Partners: Male     Birth control/protection: None       Objective   /70 (BP Location: Left arm, Patient Position: Sitting, Cuff Size: Standard)   Pulse 76   Temp 97.8 °F (36.6 °C) (Temporal)   Resp 18   Ht 5' 4\" (1.626 m)   Wt 59.4 kg (131 lb)   SpO2 99%   BMI 22.49 kg/m²     Physical Exam  Vitals reviewed.   Constitutional:       General: She is not in acute distress.     Appearance: Normal appearance.   HENT:      Head: Normocephalic and atraumatic.      Right Ear: External ear normal.      Left Ear: External ear normal.      Nose: Nose normal.      Mouth/Throat:      Mouth: Mucous membranes are moist.      Pharynx: Oropharynx is clear.   Eyes:      Extraocular Movements: Extraocular movements intact.      Conjunctiva/sclera: Conjunctivae normal.      Pupils: Pupils are equal, round, and reactive to light.   Cardiovascular:      Rate and Rhythm: Normal rate and regular rhythm.      Pulses: Normal pulses.      Heart sounds: Normal heart sounds.   Pulmonary:      Effort: Pulmonary effort is normal.      Breath sounds: Normal breath sounds.   Abdominal:      General: Abdomen is flat. Bowel sounds are normal.      Palpations: Abdomen is soft.      Tenderness: There is no abdominal tenderness.   Musculoskeletal:      Cervical back: Neck supple.    "   Right lower leg: No edema.      Left lower leg: No edema.   Lymphadenopathy:      Cervical: No cervical adenopathy.   Skin:     General: Skin is warm and dry.      Capillary Refill: Capillary refill takes less than 2 seconds.   Neurological:      Mental Status: She is alert and oriented to person, place, and time.   Psychiatric:         Mood and Affect: Mood normal.         Behavior: Behavior normal.         Thought Content: Thought content normal.         Judgment: Judgment normal.

## 2025-04-02 ENCOUNTER — OFFICE VISIT (OUTPATIENT)
Dept: GASTROENTEROLOGY | Facility: CLINIC | Age: 25
End: 2025-04-02
Payer: COMMERCIAL

## 2025-04-02 VITALS
HEIGHT: 64 IN | TEMPERATURE: 96.9 F | SYSTOLIC BLOOD PRESSURE: 102 MMHG | BODY MASS INDEX: 22.5 KG/M2 | DIASTOLIC BLOOD PRESSURE: 74 MMHG | WEIGHT: 131.8 LBS

## 2025-04-02 DIAGNOSIS — R10.13 DYSPEPSIA: Primary | ICD-10-CM

## 2025-04-02 PROCEDURE — 99214 OFFICE O/P EST MOD 30 MIN: CPT | Performed by: INTERNAL MEDICINE

## 2025-04-02 NOTE — PROGRESS NOTES
Name: Ana Banuelos      : 2000      MRN: [id number]  Encounter Provider: Candace Altamirano DO  Encounter Date: 2025   Encounter department: [facility] [place]  :  Assessment & Plan  Dyspepsia  24 y.o. female who presents for follow-up regarding dyspepsia. She was seen initially 25 omeprazole dose was increased to 40 mg daily. Since then, her symptoms have improved significantly and only occurs once weekly with trigger foods. Denies any associated reflux symptoms, changes in bowel habits, or other acute GI complaints  Continue omeprazole 40 mg once daily; 30-60 minutes before meals. If symptoms continue to improve/resolve at next office visit, recommend de-escalating PPI at that time  No indication for upper endoscopy at this juncture; can consider in the future if symptoms worsen   Once patient is off PPI, recommend completion of H. Pylori stool test for evaluation of dyspepsia  Follow-up in 6 months       History of Present Illness   HPI  Ana Banuelos is a 24 y.o. female with a history of PRES with seizures 2/ eclampsia who presents for follow-up.     Patient was seen 25 for consult regarding epigastric pain. Her omeprazole dose was increased to 40 mg daily. Since then, her symptoms have improved significantly. She was previously having pain with every meal and now this only occurs about once a week. The pain is described as epigastric, burning sensation. Can last for a few hours at a time. Denies any associated reflux symptoms, changes in bowel habits. No history of gastric cancer or other GI cancers in the family. Denies tobacco use. No prior EGD.     Review of Systems Negative other than stated above     Objective   There were no vitals taken for this visit.     Physical Exam  General: No apparent distress, resting comfortably   Head: Normocephalic, atraumatic  Eyes: Anicteric, no conjunctival erythema  ENT: External ear normal, no nasal  discharge  Neck: Trachea midline, no visible lymphadenopathy   Respiratory:  Non-labored respirations, symmetric thorax expansion  Cardiovascular:  Extremities appear well-perfused  Abdomen: Non-distended   Extremities: Moves extremities spontaneously  Skin: No visible rashes or jaundice  Neuro: No gross focal deficits, no aphasia     Candace Altamirano D.O.  Gastroenterology Fellow, PGY-4  [facility]

## 2025-07-09 ENCOUNTER — TELEPHONE (OUTPATIENT)
Dept: GASTROENTEROLOGY | Facility: CLINIC | Age: 25
End: 2025-07-09

## 2025-07-11 ENCOUNTER — TELEPHONE (OUTPATIENT)
Dept: GASTROENTEROLOGY | Facility: CLINIC | Age: 25
End: 2025-07-11

## 2025-07-11 NOTE — TELEPHONE ENCOUNTER
Called pt to schedule 6 month and pt stated she actually doesn't want to follow up no more she is all well.

## 2025-08-08 ENCOUNTER — NURSE TRIAGE (OUTPATIENT)
Age: 25
End: 2025-08-08

## 2025-08-13 ENCOUNTER — NURSE TRIAGE (OUTPATIENT)
Age: 25
End: 2025-08-13

## 2025-08-14 ENCOUNTER — OFFICE VISIT (OUTPATIENT)
Dept: OBGYN CLINIC | Facility: MEDICAL CENTER | Age: 25
End: 2025-08-14
Payer: COMMERCIAL

## (undated) DEVICE — SUT PLAIN 3-0 CT-1 27 IN 842H

## (undated) DEVICE — TELFA NON-ADHERENT ABSORBENT DRESSING: Brand: TELFA

## (undated) DEVICE — SUT MONOCRYL 4-0 PS-2 27 IN Y426H

## (undated) DEVICE — GLOVE INDICATOR PI UNDERGLOVE SZ 6.5 BLUE

## (undated) DEVICE — 3M™ STERI-STRIP™ REINFORCED ADHESIVE SKIN CLOSURES, R1547, 1/2 IN X 4 IN (12 MM X 100 MM), 6 STRIPS/ENVELOPE: Brand: 3M™ STERI-STRIP™

## (undated) DEVICE — GLOVE SRG BIOGEL ECLIPSE 6

## (undated) DEVICE — 3M™ STERI-STRIP™ REINFORCED ADHESIVE SKIN CLOSURES, R1542, 1/4 IN X 1-1/2 IN (6 MM X 38 MM), 6 STRIPS/ENVELOPE: Brand: 3M™ STERI-STRIP™

## (undated) DEVICE — SUT VICRYL 0 CT-1 36 IN J946H

## (undated) DEVICE — WOUND RETRACTOR AND PROTECTOR: Brand: ALEXIS O WOUND PROTECTOR-RETRACTOR

## (undated) DEVICE — ABDOMINAL PAD: Brand: DERMACEA

## (undated) DEVICE — GAUZE SPONGES,16 PLY: Brand: CURITY

## (undated) DEVICE — MEDI-VAC YANKAUER SUCTION HANDLE W/STRAIGHT TIP & CONTROL VENT: Brand: CARDINAL HEALTH

## (undated) DEVICE — 3M™ STERI-STRIP™ COMPOUND BENZOIN TINCTURE 40 BAGS/CARTON 4 CARTONS/CASE C1544: Brand: 3M™ STERI-STRIP™